# Patient Record
Sex: FEMALE | Race: BLACK OR AFRICAN AMERICAN | NOT HISPANIC OR LATINO | Employment: OTHER | URBAN - METROPOLITAN AREA
[De-identification: names, ages, dates, MRNs, and addresses within clinical notes are randomized per-mention and may not be internally consistent; named-entity substitution may affect disease eponyms.]

---

## 2017-01-06 DIAGNOSIS — I10 ESSENTIAL HYPERTENSION: ICD-10-CM

## 2017-01-06 RX ORDER — IRBESARTAN 150 MG/1
TABLET ORAL
Qty: 30 TABLET | Refills: 0 | Status: SHIPPED | OUTPATIENT
Start: 2017-01-06 | End: 2017-02-21 | Stop reason: SDUPTHER

## 2017-01-06 RX ORDER — ALLOPURINOL 100 MG/1
TABLET ORAL
Qty: 60 TABLET | Refills: 5 | Status: SHIPPED | OUTPATIENT
Start: 2017-01-06 | End: 2018-03-08 | Stop reason: SDUPTHER

## 2017-01-06 RX ORDER — TORSEMIDE 5 MG/1
TABLET ORAL
Qty: 30 TABLET | Refills: 0 | Status: SHIPPED | OUTPATIENT
Start: 2017-01-06 | End: 2017-03-01 | Stop reason: SDUPTHER

## 2017-01-18 ENCOUNTER — APPOINTMENT (OUTPATIENT)
Dept: GENERAL RADIOLOGY | Facility: HOSPITAL | Age: 67
End: 2017-01-18

## 2017-01-18 ENCOUNTER — HOSPITAL ENCOUNTER (EMERGENCY)
Facility: HOSPITAL | Age: 67
Discharge: HOME | End: 2017-01-18
Attending: EMERGENCY MEDICINE | Admitting: EMERGENCY MEDICINE

## 2017-01-18 VITALS
SYSTOLIC BLOOD PRESSURE: 147 MMHG | RESPIRATION RATE: 16 BRPM | WEIGHT: 185 LBS | TEMPERATURE: 97.6 F | BODY MASS INDEX: 30.82 KG/M2 | DIASTOLIC BLOOD PRESSURE: 95 MMHG | HEIGHT: 65 IN | HEART RATE: 52 BPM | OXYGEN SATURATION: 96 %

## 2017-01-18 DIAGNOSIS — R07.89 ATYPICAL CHEST PAIN: Primary | ICD-10-CM

## 2017-01-18 DIAGNOSIS — I10 ESSENTIAL HYPERTENSION: ICD-10-CM

## 2017-01-18 DIAGNOSIS — S29.019A THORACIC MYOFASCIAL STRAIN, INITIAL ENCOUNTER: ICD-10-CM

## 2017-01-18 LAB
ALBUMIN SERPL-MCNC: 4.9 G/DL (ref 3.2–4.8)
ALBUMIN/GLOB SERPL: 1.5 G/DL (ref 1.5–2.5)
ALP SERPL-CCNC: 95 U/L (ref 25–100)
ALT SERPL W P-5'-P-CCNC: 57 U/L (ref 7–40)
ANION GAP SERPL CALCULATED.3IONS-SCNC: 12 MMOL/L (ref 3–11)
AST SERPL-CCNC: 38 U/L (ref 0–33)
BASOPHILS # BLD AUTO: 0.02 10*3/MM3 (ref 0–0.2)
BASOPHILS NFR BLD AUTO: 0.3 % (ref 0–1)
BILIRUB SERPL-MCNC: 1.1 MG/DL (ref 0.3–1.2)
BNP SERPL-MCNC: 49 PG/ML (ref 0–100)
BUN BLD-MCNC: 11 MG/DL (ref 9–23)
BUN/CREAT SERPL: 13.8 (ref 7–25)
CALCIUM SPEC-SCNC: 10.1 MG/DL (ref 8.7–10.4)
CHLORIDE SERPL-SCNC: 103 MMOL/L (ref 99–109)
CO2 SERPL-SCNC: 26 MMOL/L (ref 20–31)
CREAT BLD-MCNC: 0.8 MG/DL (ref 0.6–1.3)
DEPRECATED RDW RBC AUTO: 40.6 FL (ref 37–54)
EOSINOPHIL # BLD AUTO: 0.21 10*3/MM3 (ref 0.1–0.3)
EOSINOPHIL NFR BLD AUTO: 3.5 % (ref 0–3)
ERYTHROCYTE [DISTWIDTH] IN BLOOD BY AUTOMATED COUNT: 12 % (ref 11.3–14.5)
GFR SERPL CREATININE-BSD FRML MDRD: 87 ML/MIN/1.73
GLOBULIN UR ELPH-MCNC: 3.2 GM/DL
GLUCOSE BLD-MCNC: 177 MG/DL (ref 70–100)
HCT VFR BLD AUTO: 43.4 % (ref 34.5–44)
HGB BLD-MCNC: 15 G/DL (ref 11.5–15.5)
HOLD SPECIMEN: NORMAL
HOLD SPECIMEN: NORMAL
IMM GRANULOCYTES # BLD: 0.02 10*3/MM3 (ref 0–0.03)
IMM GRANULOCYTES NFR BLD: 0.3 % (ref 0–0.6)
LIPASE SERPL-CCNC: 28 U/L (ref 6–51)
LYMPHOCYTES # BLD AUTO: 2.62 10*3/MM3 (ref 0.6–4.8)
LYMPHOCYTES NFR BLD AUTO: 43.1 % (ref 24–44)
MCH RBC QN AUTO: 31.4 PG (ref 27–31)
MCHC RBC AUTO-ENTMCNC: 34.6 G/DL (ref 32–36)
MCV RBC AUTO: 91 FL (ref 80–99)
MONOCYTES # BLD AUTO: 0.42 10*3/MM3 (ref 0–1)
MONOCYTES NFR BLD AUTO: 6.9 % (ref 0–12)
NEUTROPHILS # BLD AUTO: 2.79 10*3/MM3 (ref 1.5–8.3)
NEUTROPHILS NFR BLD AUTO: 45.9 % (ref 41–71)
PLATELET # BLD AUTO: 307 10*3/MM3 (ref 150–450)
PMV BLD AUTO: 11.6 FL (ref 6–12)
POTASSIUM BLD-SCNC: 3.6 MMOL/L (ref 3.5–5.5)
PROT SERPL-MCNC: 8.1 G/DL (ref 5.7–8.2)
RBC # BLD AUTO: 4.77 10*6/MM3 (ref 3.89–5.14)
SODIUM BLD-SCNC: 141 MMOL/L (ref 132–146)
TROPONIN I SERPL-MCNC: 0 NG/ML (ref 0–0.07)
TROPONIN I SERPL-MCNC: 0.01 NG/ML (ref 0–0.07)
WBC NRBC COR # BLD: 6.08 10*3/MM3 (ref 3.5–10.8)
WHOLE BLOOD HOLD SPECIMEN: NORMAL
WHOLE BLOOD HOLD SPECIMEN: NORMAL

## 2017-01-18 PROCEDURE — 93005 ELECTROCARDIOGRAM TRACING: CPT | Performed by: EMERGENCY MEDICINE

## 2017-01-18 PROCEDURE — 25010000002 HYDROMORPHONE PER 4 MG: Performed by: EMERGENCY MEDICINE

## 2017-01-18 PROCEDURE — 84484 ASSAY OF TROPONIN QUANT: CPT

## 2017-01-18 PROCEDURE — 25010000002 ONDANSETRON PER 1 MG: Performed by: EMERGENCY MEDICINE

## 2017-01-18 PROCEDURE — 83880 ASSAY OF NATRIURETIC PEPTIDE: CPT | Performed by: EMERGENCY MEDICINE

## 2017-01-18 PROCEDURE — 96375 TX/PRO/DX INJ NEW DRUG ADDON: CPT

## 2017-01-18 PROCEDURE — 25010000002 KETOROLAC TROMETHAMINE PER 15 MG: Performed by: EMERGENCY MEDICINE

## 2017-01-18 PROCEDURE — 80053 COMPREHEN METABOLIC PANEL: CPT | Performed by: EMERGENCY MEDICINE

## 2017-01-18 PROCEDURE — 96374 THER/PROPH/DIAG INJ IV PUSH: CPT

## 2017-01-18 PROCEDURE — 85025 COMPLETE CBC W/AUTO DIFF WBC: CPT | Performed by: EMERGENCY MEDICINE

## 2017-01-18 PROCEDURE — 93005 ELECTROCARDIOGRAM TRACING: CPT

## 2017-01-18 PROCEDURE — 83690 ASSAY OF LIPASE: CPT | Performed by: EMERGENCY MEDICINE

## 2017-01-18 PROCEDURE — 99284 EMERGENCY DEPT VISIT MOD MDM: CPT

## 2017-01-18 PROCEDURE — 71020 HC CHEST PA AND LATERAL: CPT

## 2017-01-18 RX ORDER — HYDROCODONE BITARTRATE AND ACETAMINOPHEN 5; 325 MG/1; MG/1
1 TABLET ORAL EVERY 6 HOURS PRN
Qty: 16 TABLET | Refills: 0 | Status: SHIPPED | OUTPATIENT
Start: 2017-01-18 | End: 2017-05-15

## 2017-01-18 RX ORDER — ASPIRIN 81 MG/1
324 TABLET, CHEWABLE ORAL ONCE
Status: COMPLETED | OUTPATIENT
Start: 2017-01-18 | End: 2017-01-18

## 2017-01-18 RX ORDER — ONDANSETRON 2 MG/ML
4 INJECTION INTRAMUSCULAR; INTRAVENOUS ONCE
Status: COMPLETED | OUTPATIENT
Start: 2017-01-18 | End: 2017-01-18

## 2017-01-18 RX ORDER — HYDROMORPHONE HYDROCHLORIDE 1 MG/ML
0.5 INJECTION, SOLUTION INTRAMUSCULAR; INTRAVENOUS; SUBCUTANEOUS ONCE
Status: COMPLETED | OUTPATIENT
Start: 2017-01-18 | End: 2017-01-18

## 2017-01-18 RX ORDER — SODIUM CHLORIDE 0.9 % (FLUSH) 0.9 %
10 SYRINGE (ML) INJECTION AS NEEDED
Status: DISCONTINUED | OUTPATIENT
Start: 2017-01-18 | End: 2017-01-18 | Stop reason: HOSPADM

## 2017-01-18 RX ORDER — KETOROLAC TROMETHAMINE 15 MG/ML
15 INJECTION, SOLUTION INTRAMUSCULAR; INTRAVENOUS ONCE
Status: COMPLETED | OUTPATIENT
Start: 2017-01-18 | End: 2017-01-18

## 2017-01-18 RX ADMIN — HYDROMORPHONE HYDROCHLORIDE 0.5 MG: 1 INJECTION, SOLUTION INTRAMUSCULAR; INTRAVENOUS; SUBCUTANEOUS at 12:00

## 2017-01-18 RX ADMIN — KETOROLAC TROMETHAMINE 15 MG: 15 INJECTION, SOLUTION INTRAMUSCULAR; INTRAVENOUS at 11:59

## 2017-01-18 RX ADMIN — ASPIRIN 81 MG 324 MG: 81 TABLET ORAL at 10:51

## 2017-01-18 RX ADMIN — ONDANSETRON 4 MG: 2 INJECTION INTRAMUSCULAR; INTRAVENOUS at 12:05

## 2017-01-18 NOTE — ED PROVIDER NOTES
Subjective   HPI Comments: Mrs. Moreno is a 66 y.o female who presents to the ED c/o chest pain starting earlier this morning. She locates a dull chest pain on her left side that originally originated from under her armpit. She also complains of a throbbing headache, throbbing right lower back pain, neck pain, earaches, swelling on her arms, and left shoulder pain and swelling for the last two weeks. She has tried ice and naproxen to relieve her shoulder pain with no relief. She also notes that her blood pressure was elevated to 191/131 tonight.  She reports that the chest pain started about 9:00 this morning and has been persistent since then.      She does report that she has a thoracic aneurysm but denies any CAD. She had a stress test one year ago by her cardiologist in [place] that was negative.       Patient is a 66 y.o. female presenting with chest pain.   History provided by:  Patient  Chest Pain   Pain location:  L chest  Pain radiates to:  Does not radiate  Pain severity:  Moderate  Onset quality:  Sudden  Duration: today.  Timing:  Constant  Progression:  Unchanged  Chronicity:  New  Relieved by:  None tried  Worsened by:  Nothing  Ineffective treatments: Naproxen and ice.  Associated symptoms: back pain and headache    Associated symptoms: no fever    Risk factors: no coronary artery disease        Review of Systems   Constitutional: Negative for chills and fever.   Eyes: Positive for pain.   Cardiovascular: Positive for chest pain. Negative for leg swelling.   Musculoskeletal: Positive for back pain, joint swelling and neck pain.   Neurological: Positive for headaches.   All other systems reviewed and are negative.      Past Medical History   Diagnosis Date   • Anemia    • Backache    • Chronic bronchitis    • Depression    • Generalized osteoarthritis of multiple sites    • GERD (gastroesophageal reflux disease)    • Gout    • Hx of migraine headaches    • Hypertension    • Low back ache    •  Migraine    • Neck pain    • Reflux esophagitis    • Sleep disturbances    • Thoracic aneurysm without mention of rupture    • Urinary tract infection    • UTI (urinary tract infection)        Allergies   Allergen Reactions   • Fenofibrate Other (See Comments) and Myalgia   • Hydroxyzine    • Norvasc [Amlodipine Besylate]    • Amlodipine Rash   • Penicillins Rash       Past Surgical History   Procedure Laterality Date   • Gallbladder surgery       anastomosis of gallbladder   • Hysterectomy     • Tonsillectomy         Family History   Problem Relation Age of Onset   • Arthritis Mother    • Stroke Mother    • Hypertension Mother    • Heart attack Father    • Arthritis Father    • Diabetes Father    • Hypertension Father    • Kidney disease Father    • Hyperlipidemia Sister    • Migraines Sister    • Diabetes Brother    • Hyperlipidemia Brother    • Hypertension Brother    • Heart attack Other    • Stroke Other    • Cancer Other    • Stroke Other    • Obesity Other        Social History     Social History   • Marital status:      Spouse name: N/A   • Number of children: N/A   • Years of education: N/A     Social History Main Topics   • Smoking status: Former Smoker   • Smokeless tobacco: Never Used      Comment: 21 years old   • Alcohol use No   • Drug use: No   • Sexual activity: Not Asked     Other Topics Concern   • None     Social History Narrative   • None         Objective   Physical Exam   Constitutional: She is oriented to person, place, and time. She appears well-developed and well-nourished. No distress.   HENT:   Head: Normocephalic and atraumatic.   Eyes: Conjunctivae are normal.   Neck: Normal range of motion. Neck supple.   Cardiovascular: Normal rate, regular rhythm, normal heart sounds and intact distal pulses.    Pulmonary/Chest: Effort normal and breath sounds normal. No respiratory distress. She exhibits tenderness (Tenderness over left chest).   Abdominal: Soft. There is no tenderness.    Musculoskeletal: Normal range of motion. She exhibits tenderness.   Tenderness over right mid back. Tenderness over left shoulder.   Neurological: She is alert and oriented to person, place, and time.   Skin: Skin is warm and dry.   Psychiatric: She has a normal mood and affect. Her behavior is normal.   Nursing note and vitals reviewed.      Procedures         ED Course  ED Course   Comment By Time   I reviewed Mrs. Moreon's old records.  She had normal MRI angiograms of her head and neck in September.  She had a CT angiogram of her chest showing an ectatic aorta that was not felt to be aneurysmal.  She reports having a stress test in February as well by her cardiologist in [place].  Her current EKG shows some inverted T waves however when I look at her old records this is a chronic finding and is normal for her.  At the time of my exam her blood pressure is 138/80. Kali Chappell MD 01/18 7871   Mrs. Moreno appears comfortable.  I spoke with her and her son about findings.  Her current blood pressures 1:30 systolic.  I advised him of findings thus far.  Her back pain seems musculoskeletal as it is reproducible with palpation.  We will get a second troponin to make sure that the chest pain she developed today is not cardiac.  I don't think we need to repeat angiograms. Kali Chappell MD 01/18 3999   I spoke with Mrs. Moreno and her son about findings.  Her back pain and shoulder pain and neck pain all seemed reproducible with movement and/or palpation.  She tells me she normally takes Aleve but has run out of that recently.  She has been referred to physical therapy but tells me she's only been able to make it to 1 appointment so far.  I encouraged her to follow through with that.  I will write her a prescription for hydrocodone, I discussed the risks of that and told her she can't drive while being on it. Kali Chappell MD 01/18 4562                     MDM  Number of Diagnoses or Management  Options  Atypical chest pain: new and requires workup  Essential hypertension: established and worsening  Thoracic myofascial strain, initial encounter: new and requires workup     Amount and/or Complexity of Data Reviewed  Clinical lab tests: ordered and reviewed  Tests in the radiology section of CPT®: ordered and reviewed  Review and summarize past medical records: yes  Discuss the patient with other providers: yes  Independent visualization of images, tracings, or specimens: yes    Patient Progress  Patient progress: improved      Final diagnoses:   Atypical chest pain   Essential hypertension   Thoracic myofascial strain, initial encounter       Documentation assistance provided by [facility].  Information recorded by the navarroe was done at my direction and has been verified and validated by me.     Giana Kaiser  01/18/17 3308       Kali Chappell MD  01/18/17 8742

## 2017-01-18 NOTE — DISCHARGE INSTRUCTIONS
No driving on the pain medication.  Follow-up with physical therapy that her doctor has ordered.  Resume taking Aleve and make sure you take it on a full stomach.

## 2017-02-21 RX ORDER — IRBESARTAN 150 MG/1
TABLET ORAL
Qty: 30 TABLET | Refills: 2 | Status: SHIPPED | OUTPATIENT
Start: 2017-02-21 | End: 2017-06-09 | Stop reason: SDUPTHER

## 2017-03-01 DIAGNOSIS — I10 ESSENTIAL HYPERTENSION: ICD-10-CM

## 2017-03-01 RX ORDER — GLIMEPIRIDE 1 MG/1
TABLET ORAL
Qty: 180 TABLET | Refills: 0 | Status: SHIPPED | OUTPATIENT
Start: 2017-03-01 | End: 2017-06-09 | Stop reason: SDUPTHER

## 2017-03-02 RX ORDER — TORSEMIDE 5 MG/1
5 TABLET ORAL DAILY
Qty: 30 TABLET | Refills: 2 | Status: SHIPPED | OUTPATIENT
Start: 2017-03-02 | End: 2017-06-11 | Stop reason: SDUPTHER

## 2017-03-21 ENCOUNTER — OFFICE VISIT (OUTPATIENT)
Dept: RETAIL CLINIC | Facility: CLINIC | Age: 67
End: 2017-03-21

## 2017-03-21 VITALS
WEIGHT: 180 LBS | TEMPERATURE: 98.7 F | HEART RATE: 73 BPM | RESPIRATION RATE: 20 BRPM | SYSTOLIC BLOOD PRESSURE: 138 MMHG | OXYGEN SATURATION: 99 % | BODY MASS INDEX: 29.99 KG/M2 | HEIGHT: 65 IN | DIASTOLIC BLOOD PRESSURE: 82 MMHG

## 2017-03-21 DIAGNOSIS — J01.90 ACUTE SINUSITIS, RECURRENCE NOT SPECIFIED, UNSPECIFIED LOCATION: Primary | ICD-10-CM

## 2017-03-21 DIAGNOSIS — R11.2 NAUSEA VOMITING AND DIARRHEA: ICD-10-CM

## 2017-03-21 DIAGNOSIS — R19.7 NAUSEA VOMITING AND DIARRHEA: ICD-10-CM

## 2017-03-21 LAB
EXPIRATION DATE: NORMAL
FLUAV AG NPH QL: NEGATIVE
FLUBV AG NPH QL: NEGATIVE
INTERNAL CONTROL: NORMAL
Lab: NORMAL

## 2017-03-21 PROCEDURE — 99213 OFFICE O/P EST LOW 20 MIN: CPT | Performed by: NURSE PRACTITIONER

## 2017-03-21 PROCEDURE — 87804 INFLUENZA ASSAY W/OPTIC: CPT | Performed by: NURSE PRACTITIONER

## 2017-03-21 RX ORDER — FLUTICASONE PROPIONATE 50 MCG
2 SPRAY, SUSPENSION (ML) NASAL DAILY
Qty: 1 EACH | Refills: 0 | Status: SHIPPED | OUTPATIENT
Start: 2017-03-21 | End: 2017-04-20

## 2017-03-21 RX ORDER — FLUCONAZOLE 150 MG/1
150 TABLET ORAL ONCE
Qty: 1 TABLET | Refills: 0 | Status: SHIPPED | OUTPATIENT
Start: 2017-03-21 | End: 2017-03-21

## 2017-03-21 RX ORDER — DOXYCYCLINE HYCLATE 100 MG/1
100 CAPSULE ORAL 2 TIMES DAILY
Qty: 14 CAPSULE | Refills: 0 | Status: SHIPPED | OUTPATIENT
Start: 2017-03-21 | End: 2017-03-28

## 2017-03-21 RX ORDER — LORATADINE 10 MG/1
10 TABLET ORAL DAILY
Qty: 10 TABLET | Refills: 0 | Status: SHIPPED | OUTPATIENT
Start: 2017-03-21 | End: 2017-03-31

## 2017-03-21 RX ORDER — PROMETHAZINE HYDROCHLORIDE 25 MG/1
12.5 TABLET ORAL EVERY 8 HOURS PRN
Qty: 5 TABLET | Refills: 0 | Status: SHIPPED | OUTPATIENT
Start: 2017-03-21 | End: 2017-03-24

## 2017-03-21 NOTE — PROGRESS NOTES
Subjective   Taina Moreno is a 67 y.o. female presents with possible sinus infection and flu like symptoms that started last Monday.   has continued to get worse.   took a zpak that she had at home and mucinex with very little relief.   has been having nausea, vomiting and diarrhea but seems to be resolving, no abdominal pain.  No vomiting today and diarrhea x 2 watery to loose stool this am.   has been drinking but not eating much since she has been sick.  has been having sinus symptoms and feels like sinus infections that she has had in the past but wasn't sure because of other symptoms.   she thought that she might have the flu due to other symptoms along with sinus symptoms.     Sinusitis   This is a new problem. The current episode started 1 to 4 weeks ago. The problem has been gradually worsening since onset. Maximum temperature: has not cked. Her pain is at a severity of 5/10. The pain is moderate. Associated symptoms include chills, congestion, coughing (mostly dry occasional yellowish green sputum), ear pain (ear pressure bilat), headaches (frontal intermittent), sinus pressure and a sore throat. Pertinent negatives include no diaphoresis, hoarse voice, neck pain, shortness of breath, sneezing or swollen glands. Past treatments include antibiotics (mucinex). The treatment provided mild relief.   Vomiting    This is a new problem. The current episode started in the past 7 days. The problem occurs less than 2 times per day. The problem has been resolved. The emesis has an appearance of bile and stomach contents. Maximum temperature: unsure has not cked. Associated symptoms include chills, coughing (mostly dry occasional yellowish green sputum), diarrhea (diarrhea x 2 today watery), a fever (did not ck, but felt hot), headaches (frontal intermittent) and myalgias (generlized body aches). Pertinent negatives include no abdominal pain, arthralgias, chest pain, sweats, URI  or weight loss. Treatments tried: take zpak and mucinex. The treatment provided no relief.   Diarrhea    This is a new problem. The current episode started in the past 7 days. The problem occurs less than 2 times per day. The problem has been gradually improving. The stool consistency is described as watery. The patient states that diarrhea does not awaken her from sleep. Associated symptoms include chills, coughing (mostly dry occasional yellowish green sputum), a fever (did not ck, but felt hot), headaches (frontal intermittent), myalgias (generlized body aches) and vomiting (no vomiting today). Pertinent negatives include no abdominal pain, arthralgias, bloating, sweats, URI or weight loss. Nothing aggravates the symptoms. Risk factors: none known. She has tried nothing for the symptoms.        The following portions of the patient's history were reviewed and updated as appropriate: allergies, current medications, past family history, past medical history, past social history and past surgical history.    Review of Systems   Constitutional: Positive for chills and fever (did not ck, but felt hot). Negative for diaphoresis and weight loss.   HENT: Positive for congestion, ear pain (ear pressure bilat), postnasal drip, sinus pressure and sore throat. Negative for hoarse voice and sneezing.    Eyes: Negative.    Respiratory: Positive for cough (mostly dry occasional yellowish green sputum). Negative for chest tightness, shortness of breath and wheezing.    Cardiovascular: Negative.  Negative for chest pain, palpitations and leg swelling.   Gastrointestinal: Positive for diarrhea (diarrhea x 2 today watery) and vomiting (no vomiting today). Negative for abdominal distention, abdominal pain, anal bleeding, bloating, blood in stool, constipation and rectal pain.   Genitourinary: Negative.    Musculoskeletal: Positive for myalgias (generlized body aches). Negative for arthralgias and neck pain.   Skin: Negative.     Neurological: Positive for headaches (frontal intermittent).       Objective   Physical Exam   Constitutional: She is oriented to person, place, and time. She appears well-developed and well-nourished. No distress.   HENT:   Head: Normocephalic.   Right Ear: A middle ear effusion is present.   Left Ear: A middle ear effusion is present.   Nose: Mucosal edema and sinus tenderness present. Right sinus exhibits maxillary sinus tenderness and frontal sinus tenderness. Left sinus exhibits maxillary sinus tenderness and frontal sinus tenderness.   Mouth/Throat: Uvula is midline and mucous membranes are normal. Posterior oropharyngeal erythema (mild erythema with PND) present. Tonsils are 0 on the right. Tonsils are 0 on the left. No tonsillar exudate.   Eyes: Conjunctivae, EOM and lids are normal. Pupils are equal, round, and reactive to light.   Neck: Normal range of motion.   Cardiovascular: Normal rate and regular rhythm.    No murmur heard.  Pulmonary/Chest: Effort normal and breath sounds normal. No respiratory distress. She has no wheezes. She has no rhonchi. She has no rales.   Abdominal: Soft. Bowel sounds are normal. She exhibits no distension. There is no tenderness.   Lymphadenopathy:     She has cervical adenopathy (bilat anterior cervical).   Neurological: She is alert and oriented to person, place, and time.   Skin: Skin is warm and dry.   Psychiatric: She has a normal mood and affect. Her speech is normal and behavior is normal. Thought content normal.       Assessment/Plan   Taina was seen today for sinusitis.    Diagnoses and all orders for this visit:    Acute sinusitis, recurrence not specified, unspecified location  Nausea vomiting and diarrhea  -     POC Influenza A / B, negative, discussed with patient  -     doxycycline (VIBRAMYCIN) 100 MG capsule; Take 1 capsule by mouth 2 (Two) Times a Day for 7 days.  -     loratadine (CLARITIN) 10 MG tablet; Take 1 tablet by mouth Daily for 10 days.  -      promethazine (PHENERGAN) 25 MG tablet; Take 0.5 tablets by mouth Every 8 (Eight) Hours As Needed for Nausea or Vomiting for up to 3 days.  -     fluticasone (FLONASE) 50 MCG/ACT nasal spray; 2 sprays into each nostril Daily for 30 days.    Instructed patient and daughter-in-law to go to PCP, urgent care or ER if continued vomiting or diarrhea due to being a diabetic and health history. (May need labs, IVFs, etc)  Patient and daughter-in-law agreed.  Patient refused to go at present time.  Medication and plan of care reviewed with patient and teaching done on med reactions/side effects.  Rest, plenty of fluids, BRAT diet and increase as tolerated, humidifier, comfort measures and follow up with your PCP if symptoms persist.  If worse in any way go to urgent care or the ER today as discussed.  Patient and daughter-in-law verbalized understanding.    ANTONI Villatoro

## 2017-04-12 RX ORDER — DILTIAZEM HYDROCHLORIDE 120 MG/1
TABLET, FILM COATED ORAL
Qty: 360 TABLET | Refills: 0 | Status: CANCELLED | OUTPATIENT
Start: 2017-04-12

## 2017-04-13 ENCOUNTER — TELEPHONE (OUTPATIENT)
Dept: FAMILY MEDICINE CLINIC | Facility: CLINIC | Age: 67
End: 2017-04-13

## 2017-04-13 NOTE — TELEPHONE ENCOUNTER
----- Message from Ai Bustos sent at 4/13/2017 11:42 AM EDT -----  Contact: [facility]  PATIENT CALLING FOR A REFILL OF HER BLOOD PRESSURE MEDICATION:    DILTAZEM 120 MG  [facility] IN [facility]

## 2017-04-14 RX ORDER — DILTIAZEM HYDROCHLORIDE 120 MG/1
TABLET, FILM COATED ORAL
Qty: 360 TABLET | Refills: 0 | Status: SHIPPED | OUTPATIENT
Start: 2017-04-14 | End: 2017-10-17 | Stop reason: SDUPTHER

## 2017-05-15 ENCOUNTER — OFFICE VISIT (OUTPATIENT)
Dept: FAMILY MEDICINE CLINIC | Facility: CLINIC | Age: 67
End: 2017-05-15

## 2017-05-15 VITALS
TEMPERATURE: 98.2 F | SYSTOLIC BLOOD PRESSURE: 140 MMHG | RESPIRATION RATE: 16 BRPM | HEIGHT: 65 IN | HEART RATE: 76 BPM | WEIGHT: 178.4 LBS | BODY MASS INDEX: 29.72 KG/M2 | DIASTOLIC BLOOD PRESSURE: 80 MMHG

## 2017-05-15 DIAGNOSIS — R80.9 TYPE 2 DIABETES MELLITUS WITH MICROALBUMINURIA, WITHOUT LONG-TERM CURRENT USE OF INSULIN (HCC): ICD-10-CM

## 2017-05-15 DIAGNOSIS — R74.8 ABNORMAL LIVER ENZYMES: ICD-10-CM

## 2017-05-15 DIAGNOSIS — Z12.31 ENCOUNTER FOR SCREENING MAMMOGRAM FOR BREAST CANCER: ICD-10-CM

## 2017-05-15 DIAGNOSIS — F51.01 PRIMARY INSOMNIA: ICD-10-CM

## 2017-05-15 DIAGNOSIS — E11.29 TYPE 2 DIABETES MELLITUS WITH MICROALBUMINURIA, WITHOUT LONG-TERM CURRENT USE OF INSULIN (HCC): ICD-10-CM

## 2017-05-15 DIAGNOSIS — I10 ESSENTIAL HYPERTENSION: ICD-10-CM

## 2017-05-15 DIAGNOSIS — E78.2 MIXED HYPERLIPIDEMIA: Primary | ICD-10-CM

## 2017-05-15 PROCEDURE — 99214 OFFICE O/P EST MOD 30 MIN: CPT | Performed by: FAMILY MEDICINE

## 2017-05-15 RX ORDER — POTASSIUM CHLORIDE 750 MG/1
10 CAPSULE, EXTENDED RELEASE ORAL DAILY
COMMUNITY
End: 2017-05-15 | Stop reason: SDUPTHER

## 2017-05-15 RX ORDER — POTASSIUM CHLORIDE 750 MG/1
10 CAPSULE, EXTENDED RELEASE ORAL DAILY
Qty: 90 CAPSULE | Refills: 3 | Status: SHIPPED | OUTPATIENT
Start: 2017-05-15 | End: 2020-02-17

## 2017-05-15 RX ORDER — TRAZODONE HYDROCHLORIDE 50 MG/1
TABLET ORAL
Qty: 60 TABLET | Refills: 3 | Status: SHIPPED | OUTPATIENT
Start: 2017-05-15 | End: 2021-02-01 | Stop reason: SDUPTHER

## 2017-05-16 LAB
ALBUMIN SERPL-MCNC: 4.9 G/DL (ref 3.2–4.8)
ALBUMIN/GLOB SERPL: 1.9 G/DL (ref 1.5–2.5)
ALP SERPL-CCNC: 110 U/L (ref 25–100)
ALT SERPL-CCNC: 60 U/L (ref 7–40)
AST SERPL-CCNC: 52 U/L (ref 0–33)
BASOPHILS # BLD AUTO: 0.03 10*3/MM3 (ref 0–0.2)
BASOPHILS NFR BLD AUTO: 0.5 % (ref 0–1)
BILIRUB SERPL-MCNC: 0.8 MG/DL (ref 0.3–1.2)
BUN SERPL-MCNC: 14 MG/DL (ref 9–23)
BUN/CREAT SERPL: 17.5 (ref 7–25)
CALCIUM SERPL-MCNC: 10.4 MG/DL (ref 8.7–10.4)
CHLORIDE SERPL-SCNC: 104 MMOL/L (ref 99–109)
CHOLEST SERPL-MCNC: 168 MG/DL (ref 0–200)
CO2 SERPL-SCNC: 26 MMOL/L (ref 20–31)
CREAT SERPL-MCNC: 0.8 MG/DL (ref 0.6–1.3)
EOSINOPHIL # BLD AUTO: 0.19 10*3/MM3 (ref 0.1–0.3)
EOSINOPHIL NFR BLD AUTO: 3 % (ref 0–3)
ERYTHROCYTE [DISTWIDTH] IN BLOOD BY AUTOMATED COUNT: 12.8 % (ref 11.3–14.5)
GLOBULIN SER CALC-MCNC: 2.6 GM/DL
GLUCOSE SERPL-MCNC: 184 MG/DL (ref 70–100)
HBA1C MFR BLD: 7.4 % (ref 4.8–5.6)
HCT VFR BLD AUTO: 43 % (ref 34.5–44)
HDLC SERPL-MCNC: 33 MG/DL (ref 40–60)
HGB BLD-MCNC: 14.4 G/DL (ref 11.5–15.5)
IMM GRANULOCYTES # BLD: 0.01 10*3/MM3 (ref 0–0.03)
IMM GRANULOCYTES NFR BLD: 0.2 % (ref 0–0.6)
LDLC SERPL CALC-MCNC: ABNORMAL MG/DL
LDLC/HDLC SERPL: ABNORMAL {RATIO}
LYMPHOCYTES # BLD AUTO: 3.02 10*3/MM3 (ref 0.6–4.8)
LYMPHOCYTES NFR BLD AUTO: 48.3 % (ref 24–44)
MCH RBC QN AUTO: 30.8 PG (ref 27–31)
MCHC RBC AUTO-ENTMCNC: 33.5 G/DL (ref 32–36)
MCV RBC AUTO: 91.9 FL (ref 80–99)
MICROALBUMIN UR-MCNC: 10.2 UG/ML
MONOCYTES # BLD AUTO: 0.44 10*3/MM3 (ref 0–1)
MONOCYTES NFR BLD AUTO: 7 % (ref 0–12)
NEUTROPHILS # BLD AUTO: 2.56 10*3/MM3 (ref 1.5–8.3)
NEUTROPHILS NFR BLD AUTO: 41 % (ref 41–71)
PLATELET # BLD AUTO: 279 10*3/MM3 (ref 150–450)
POTASSIUM SERPL-SCNC: 4.1 MMOL/L (ref 3.5–5.5)
PROT SERPL-MCNC: 7.5 G/DL (ref 5.7–8.2)
RBC # BLD AUTO: 4.68 10*6/MM3 (ref 3.89–5.14)
SODIUM SERPL-SCNC: 144 MMOL/L (ref 132–146)
TRIGL SERPL-MCNC: 497 MG/DL (ref 0–150)
VLDLC SERPL CALC-MCNC: ABNORMAL MG/DL
WBC # BLD AUTO: 6.25 10*3/MM3 (ref 3.5–10.8)

## 2017-05-17 ENCOUNTER — TELEPHONE (OUTPATIENT)
Dept: FAMILY MEDICINE CLINIC | Facility: CLINIC | Age: 67
End: 2017-05-17

## 2017-05-17 DIAGNOSIS — E11.29 TYPE 2 DIABETES MELLITUS WITH MICROALBUMINURIA, WITHOUT LONG-TERM CURRENT USE OF INSULIN (HCC): Primary | ICD-10-CM

## 2017-05-17 DIAGNOSIS — R80.9 TYPE 2 DIABETES MELLITUS WITH MICROALBUMINURIA, WITHOUT LONG-TERM CURRENT USE OF INSULIN (HCC): Primary | ICD-10-CM

## 2017-05-17 RX ORDER — ATORVASTATIN CALCIUM 20 MG/1
20 TABLET, FILM COATED ORAL DAILY
Qty: 90 TABLET | Refills: 1 | Status: SHIPPED | OUTPATIENT
Start: 2017-05-17 | End: 2017-09-08

## 2017-05-19 ENCOUNTER — TELEPHONE (OUTPATIENT)
Dept: FAMILY MEDICINE CLINIC | Facility: CLINIC | Age: 67
End: 2017-05-19

## 2017-05-23 ENCOUNTER — TELEPHONE (OUTPATIENT)
Dept: FAMILY MEDICINE CLINIC | Facility: CLINIC | Age: 67
End: 2017-05-23

## 2017-06-09 ENCOUNTER — TELEPHONE (OUTPATIENT)
Dept: FAMILY MEDICINE CLINIC | Facility: CLINIC | Age: 67
End: 2017-06-09

## 2017-06-09 DIAGNOSIS — I10 ESSENTIAL HYPERTENSION: ICD-10-CM

## 2017-06-09 RX ORDER — GLIMEPIRIDE 1 MG/1
TABLET ORAL
Qty: 180 TABLET | Refills: 0 | Status: SHIPPED | OUTPATIENT
Start: 2017-06-09 | End: 2017-10-17 | Stop reason: SDUPTHER

## 2017-06-09 RX ORDER — IRBESARTAN 150 MG/1
TABLET ORAL
Qty: 30 TABLET | Refills: 0 | Status: SHIPPED | OUTPATIENT
Start: 2017-06-09 | End: 2017-06-11 | Stop reason: SDUPTHER

## 2017-06-09 NOTE — TELEPHONE ENCOUNTER
----- Message from Katie Lloyd sent at 6/9/2017  4:21 PM EDT -----  Contact: DR. ALONZO; MED REFILL REQUEST   Pt is requesting a refill on the following medication   irbesartan (AVAPRO) 150 MG tablet      Estradiol (ESTRACE) 1 MG tablet    torsemide (DEMADEX) 5 MG tablet    Pharmacy   [facility]     Call Back #  [phone]

## 2017-06-11 RX ORDER — TORSEMIDE 5 MG/1
5 TABLET ORAL DAILY
Qty: 30 TABLET | Refills: 2 | Status: SHIPPED | OUTPATIENT
Start: 2017-06-11 | End: 2018-08-12 | Stop reason: SDUPTHER

## 2017-06-11 RX ORDER — ESTRADIOL 1 MG/1
1 TABLET ORAL DAILY
Qty: 30 TABLET | Refills: 2 | Status: SHIPPED | OUTPATIENT
Start: 2017-06-11 | End: 2017-12-07 | Stop reason: SDUPTHER

## 2017-06-11 RX ORDER — IRBESARTAN 150 MG/1
150 TABLET ORAL DAILY
Qty: 30 TABLET | Refills: 2 | Status: SHIPPED | OUTPATIENT
Start: 2017-06-11 | End: 2017-10-12 | Stop reason: SDUPTHER

## 2017-09-08 ENCOUNTER — OFFICE VISIT (OUTPATIENT)
Dept: FAMILY MEDICINE CLINIC | Facility: CLINIC | Age: 67
End: 2017-09-08

## 2017-09-08 VITALS
SYSTOLIC BLOOD PRESSURE: 150 MMHG | HEIGHT: 65 IN | RESPIRATION RATE: 16 BRPM | DIASTOLIC BLOOD PRESSURE: 60 MMHG | HEART RATE: 72 BPM | WEIGHT: 180.4 LBS | BODY MASS INDEX: 30.06 KG/M2 | TEMPERATURE: 98.1 F

## 2017-09-08 DIAGNOSIS — E53.8 COBALAMIN DEFICIENCY: ICD-10-CM

## 2017-09-08 DIAGNOSIS — F32.1 MODERATE SINGLE CURRENT EPISODE OF MAJOR DEPRESSIVE DISORDER (HCC): ICD-10-CM

## 2017-09-08 DIAGNOSIS — E11.29 TYPE 2 DIABETES MELLITUS WITH MICROALBUMINURIA, WITHOUT LONG-TERM CURRENT USE OF INSULIN (HCC): Primary | ICD-10-CM

## 2017-09-08 DIAGNOSIS — R01.1 HEART MURMUR: ICD-10-CM

## 2017-09-08 DIAGNOSIS — R80.9 TYPE 2 DIABETES MELLITUS WITH MICROALBUMINURIA, WITHOUT LONG-TERM CURRENT USE OF INSULIN (HCC): Primary | ICD-10-CM

## 2017-09-08 DIAGNOSIS — Z12.11 COLON CANCER SCREENING: ICD-10-CM

## 2017-09-08 DIAGNOSIS — E78.2 MIXED HYPERLIPIDEMIA: ICD-10-CM

## 2017-09-08 DIAGNOSIS — R74.8 ABNORMAL LIVER ENZYMES: ICD-10-CM

## 2017-09-08 DIAGNOSIS — I10 ESSENTIAL HYPERTENSION: ICD-10-CM

## 2017-09-08 DIAGNOSIS — R51.9 HEADACHE, UNSPECIFIED HEADACHE TYPE: ICD-10-CM

## 2017-09-08 LAB
ALBUMIN SERPL-MCNC: 4.6 G/DL (ref 3.2–4.8)
ALBUMIN/GLOB SERPL: 1.5 G/DL (ref 1.5–2.5)
ALP SERPL-CCNC: 118 U/L (ref 25–100)
ALT SERPL-CCNC: 62 U/L (ref 7–40)
AST SERPL-CCNC: 46 U/L (ref 0–33)
BILIRUB SERPL-MCNC: 0.8 MG/DL (ref 0.3–1.2)
BUN SERPL-MCNC: 9 MG/DL (ref 9–23)
BUN/CREAT SERPL: 11.3 (ref 7–25)
CALCIUM SERPL-MCNC: 9.7 MG/DL (ref 8.7–10.4)
CHLORIDE SERPL-SCNC: 103 MMOL/L (ref 99–109)
CHOLEST SERPL-MCNC: 141 MG/DL (ref 0–200)
CO2 SERPL-SCNC: 26 MMOL/L (ref 20–31)
CREAT SERPL-MCNC: 0.8 MG/DL (ref 0.6–1.3)
GLOBULIN SER CALC-MCNC: 3 GM/DL
GLUCOSE SERPL-MCNC: 163 MG/DL (ref 70–100)
HBA1C MFR BLD: 6.7 % (ref 4.8–5.6)
HDLC SERPL-MCNC: 36 MG/DL (ref 40–60)
LDLC SERPL CALC-MCNC: 55 MG/DL (ref 0–100)
LDLC/HDLC SERPL: 1.54 {RATIO}
POTASSIUM SERPL-SCNC: 4 MMOL/L (ref 3.5–5.5)
PROT SERPL-MCNC: 7.6 G/DL (ref 5.7–8.2)
SODIUM SERPL-SCNC: 139 MMOL/L (ref 132–146)
TRIGL SERPL-MCNC: 248 MG/DL (ref 0–150)
VIT B12 SERPL-MCNC: 495 PG/ML (ref 211–911)
VLDLC SERPL CALC-MCNC: 49.6 MG/DL

## 2017-09-08 PROCEDURE — 99214 OFFICE O/P EST MOD 30 MIN: CPT | Performed by: FAMILY MEDICINE

## 2017-09-08 RX ORDER — FLUOXETINE HYDROCHLORIDE 20 MG/1
20 CAPSULE ORAL DAILY
Qty: 30 CAPSULE | Refills: 5 | Status: SHIPPED | OUTPATIENT
Start: 2017-09-08 | End: 2018-02-08 | Stop reason: SDDI

## 2017-09-08 RX ORDER — TRAMADOL HYDROCHLORIDE 50 MG/1
TABLET ORAL
Qty: 20 TABLET | Refills: 0 | Status: SHIPPED | OUTPATIENT
Start: 2017-09-08 | End: 2018-08-26 | Stop reason: SDUPTHER

## 2017-09-08 NOTE — PROGRESS NOTES
Subjective    FU Diab Type 2 & HLP. Pt has not been taking Atorvastatin 20mg, was afraid to take it.   NOTE: pt is NOT fasting today.   Pt will call back later w/ refills if necessary.   See depression screening.    Taina Moreno is a 67 y.o. female.     History of Present Illness   Patient returns today for follow-up of chronic medical conditions as noted above.      Since Taina's last visit She has not been to an Emergency Dept or Urgent Care facility.    She has had no problems with current medications.    Did not take lipid Rx - concerned about it; did not call in her concerns. Did not feel well with the medication.    HA off and on for a while - pounding from the neck up; not resting well; sometimes BP is normal and still with HA.    Lots of stress - feels jittery all the time, not enjoying things as before - last 2 years.    The following portions of the patient's history were reviewed and updated as appropriate: allergies, current medications, past medical history, past social history and problem list.    Review of Systems   Constitutional: Positive for fatigue.   Respiratory: Negative.  Negative for shortness of breath.    Cardiovascular: Negative.  Negative for chest pain.   Gastrointestinal: Negative.    Musculoskeletal: Negative.    Skin: Negative.    Neurological: Positive for headaches. Negative for numbness.   Hematological: Negative for adenopathy.   Psychiatric/Behavioral: Positive for dysphoric mood and sleep disturbance. Negative for hallucinations. The patient is nervous/anxious.        Objective   Physical Exam   Constitutional: She is oriented to person, place, and time. She appears well-developed and well-nourished.   HENT:   Mouth/Throat: Oropharynx is clear and moist.   Eyes: No scleral icterus.   Neck: Carotid bruit is not present.   Cardiovascular: Normal rate and regular rhythm.    Murmur (trace systolic murmur) heard.  Pulmonary/Chest: Effort normal. She has no wheezes.   Abdominal:  Soft. Bowel sounds are normal. There is no tenderness.   Musculoskeletal: She exhibits no edema.    Taina had a diabetic foot exam performed today.  Lymphadenopathy:     She has no cervical adenopathy.   Neurological: She is alert and oriented to person, place, and time.   Skin: Skin is warm and dry. No rash noted.   Psychiatric: Thought content normal.   Tired appearing, flat affect   Nursing note and vitals reviewed.      Assessment/Plan   Taina was seen today for follow-up, diabetes and hyperlipidemia.    Diagnoses and all orders for this visit:    Type 2 diabetes mellitus with microalbuminuria, without long-term current use of insulin  -     Comprehensive Metabolic Panel  -     Hemoglobin A1c    Essential hypertension  -     Comprehensive Metabolic Panel    Mixed hyperlipidemia  -     Comprehensive Metabolic Panel  -     Lipid Panel With LDL / HDL Ratio    Abnormal liver enzymes    Cobalamin deficiency  -     Vitamin B12    Colon cancer screening  -     Ambulatory Referral For Screening Colonoscopy    Headache, unspecified headache type  -     traMADol (ULTRAM) 50 MG tablet; One po bid prn headache    Moderate single current episode of major depressive disorder  -     FLUoxetine (PROZAC) 20 MG capsule; Take 1 capsule by mouth Daily.    Heart murmur    Other orders  -     Cancel: Mammo Screening Digital Tomosynthesis Bilateral With CAD; Future  -     aspirin 81 MG tablet; Take 1 tablet by mouth Daily.    Recheck in 2 months - think the depression is key to her improving  Unclear etiology of HA - hydrate, better rest and clean diet (rare tramadol use as I would want her clear of NSAIDs)    Begin ASA 81 mg a day - emphasized the importance of this treatment for stroke and CAD prevention    Labs as noted    Overdue for mammogram - she needs to call - has rescheduled a number of times    Colon cancer screening due - will work on arranging colonoscopy over the next few months.    I continued to urge her to have  regular visits with us.  Difficult to deal with the number of medical problems that she's not been doing well with only every 6-12 months.  She is agreeable.  I do think that treating her depression will make a big difference in her overall well-being and hopefully the way that she's able to attack her some of her medical issues.    Recheck slight murmur at follow-up. May need echo.

## 2017-09-11 ENCOUNTER — TELEPHONE (OUTPATIENT)
Dept: FAMILY MEDICINE CLINIC | Facility: CLINIC | Age: 67
End: 2017-09-11

## 2017-09-11 RX ORDER — PROMETHAZINE HYDROCHLORIDE 25 MG/1
TABLET ORAL
Qty: 20 TABLET | Refills: 1 | Status: SHIPPED | OUTPATIENT
Start: 2017-09-11 | End: 2017-11-14 | Stop reason: SDUPTHER

## 2017-09-13 ENCOUNTER — DOCUMENTATION (OUTPATIENT)
Dept: FAMILY MEDICINE CLINIC | Facility: CLINIC | Age: 67
End: 2017-09-13

## 2017-09-13 NOTE — PROGRESS NOTES
Patient called GI and refused colonoscopy until maybe next year. I made clear the risks of waiting including undiagnosed polyps, or cancer.

## 2017-10-12 RX ORDER — IRBESARTAN 150 MG/1
TABLET ORAL
Qty: 30 TABLET | Refills: 2 | Status: SHIPPED | OUTPATIENT
Start: 2017-10-12 | End: 2018-01-06 | Stop reason: SDUPTHER

## 2017-10-17 RX ORDER — DILTIAZEM HYDROCHLORIDE 120 MG/1
TABLET, FILM COATED ORAL
Qty: 360 TABLET | Refills: 0 | Status: SHIPPED | OUTPATIENT
Start: 2017-10-17 | End: 2018-04-12 | Stop reason: SDUPTHER

## 2017-10-17 RX ORDER — GLIMEPIRIDE 1 MG/1
TABLET ORAL
Qty: 180 TABLET | Refills: 0 | Status: SHIPPED | OUTPATIENT
Start: 2017-10-17 | End: 2017-12-07 | Stop reason: SDUPTHER

## 2017-11-14 ENCOUNTER — OFFICE VISIT (OUTPATIENT)
Dept: FAMILY MEDICINE CLINIC | Facility: CLINIC | Age: 67
End: 2017-11-14

## 2017-11-14 VITALS
HEIGHT: 65 IN | WEIGHT: 181 LBS | DIASTOLIC BLOOD PRESSURE: 72 MMHG | BODY MASS INDEX: 30.16 KG/M2 | SYSTOLIC BLOOD PRESSURE: 130 MMHG | RESPIRATION RATE: 18 BRPM | HEART RATE: 76 BPM | TEMPERATURE: 97.8 F

## 2017-11-14 DIAGNOSIS — N30.01 ACUTE CYSTITIS WITH HEMATURIA: Primary | ICD-10-CM

## 2017-11-14 DIAGNOSIS — R31.0 GROSS HEMATURIA: ICD-10-CM

## 2017-11-14 LAB
BILIRUB BLD-MCNC: NEGATIVE MG/DL
CLARITY, POC: CLEAR
COLOR UR: ABNORMAL
GLUCOSE UR STRIP-MCNC: NEGATIVE MG/DL
KETONES UR QL: NEGATIVE
LEUKOCYTE EST, POC: ABNORMAL
NITRITE UR-MCNC: NEGATIVE MG/ML
PH UR: 5.5 [PH] (ref 5–8)
PROT UR STRIP-MCNC: ABNORMAL MG/DL
RBC # UR STRIP: ABNORMAL /UL
SP GR UR: 1.03 (ref 1–1.03)
UROBILINOGEN UR QL: ABNORMAL

## 2017-11-14 PROCEDURE — 99213 OFFICE O/P EST LOW 20 MIN: CPT | Performed by: FAMILY MEDICINE

## 2017-11-14 PROCEDURE — 81003 URINALYSIS AUTO W/O SCOPE: CPT | Performed by: FAMILY MEDICINE

## 2017-11-14 RX ORDER — PROMETHAZINE HYDROCHLORIDE 25 MG/1
TABLET ORAL
Qty: 20 TABLET | Refills: 1 | OUTPATIENT
Start: 2017-11-14 | End: 2018-02-23

## 2017-11-14 RX ORDER — CIPROFLOXACIN 500 MG/1
500 TABLET, FILM COATED ORAL 2 TIMES DAILY
Qty: 14 TABLET | Refills: 0 | Status: SHIPPED | OUTPATIENT
Start: 2017-11-14 | End: 2017-12-07

## 2017-11-14 RX ORDER — FLUCONAZOLE 200 MG/1
TABLET ORAL
Qty: 2 TABLET | Refills: 0 | Status: SHIPPED | OUTPATIENT
Start: 2017-11-14 | End: 2018-02-08

## 2017-11-14 NOTE — PROGRESS NOTES
Subjective   Taina Moreno is a 67 y.o. female.     History of Present Illness     Her urine has been darker more recently and then had an odor  She then thought it looked like there was blood  Pain in her back and nausea as well (phenergan helped)  Also feels weak  No fevers that she is aware of but had sweats and felt hot      Review of Systems   Constitutional: Negative for fever.   Genitourinary: Positive for frequency and urgency.       Objective   Physical Exam   Constitutional: She appears well-developed and well-nourished. No distress.   Cardiovascular: Normal rate, regular rhythm and normal heart sounds.    Pulmonary/Chest: Effort normal and breath sounds normal.   Abdominal: Soft. Normal appearance and bowel sounds are normal. There is tenderness (very mild) in the suprapubic area. There is no CVA tenderness.   Psychiatric: She has a normal mood and affect. Her behavior is normal.   Nursing note and vitals reviewed.      Assessment/Plan   Taina was seen today for urinary tract infection.    Diagnoses and all orders for this visit:    Acute cystitis with hematuria  -     POCT urinalysis dipstick, automated  -     Urine Culture - Urine, Urine, Clean Catch  -     promethazine (PHENERGAN) 25 MG tablet; 1/2 to one po q 4-6 hours prn nausea  -     ciprofloxacin (CIPRO) 500 MG tablet; Take 1 tablet by mouth 2 (Two) Times a Day.    Gross hematuria    Other orders  -     fluconazole (DIFLUCAN) 200 MG tablet; 1 po now, ok to repeat in 3 days if needed    likely UTI, will treat with cipro BID, phenergan and check urine culture.  F/u pending cx.  Did ask pt to return for UA in 2 weeks to make sure hematuria resolves.  Pt agrees.  Ok diflucan for yeast infection

## 2017-11-16 LAB
BACTERIA UR CULT: ABNORMAL
BACTERIA UR CULT: ABNORMAL

## 2017-11-27 ENCOUNTER — TELEPHONE (OUTPATIENT)
Dept: FAMILY MEDICINE CLINIC | Facility: CLINIC | Age: 67
End: 2017-11-27

## 2017-12-07 RX ORDER — ESTRADIOL 1 MG/1
TABLET ORAL
Qty: 30 TABLET | Refills: 2 | Status: SHIPPED | OUTPATIENT
Start: 2017-12-07 | End: 2018-05-15 | Stop reason: SDUPTHER

## 2017-12-07 RX ORDER — GLIMEPIRIDE 1 MG/1
TABLET ORAL
Qty: 180 TABLET | Refills: 0 | Status: SHIPPED | OUTPATIENT
Start: 2017-12-07 | End: 2018-06-22 | Stop reason: SDUPTHER

## 2018-01-08 RX ORDER — IRBESARTAN 150 MG/1
TABLET ORAL
Qty: 30 TABLET | Refills: 0 | Status: SHIPPED | OUTPATIENT
Start: 2018-01-08 | End: 2018-02-07 | Stop reason: SDUPTHER

## 2018-02-08 ENCOUNTER — OFFICE VISIT (OUTPATIENT)
Dept: FAMILY MEDICINE CLINIC | Facility: CLINIC | Age: 68
End: 2018-02-08

## 2018-02-08 VITALS
RESPIRATION RATE: 16 BRPM | WEIGHT: 180.8 LBS | TEMPERATURE: 98.1 F | HEART RATE: 80 BPM | HEIGHT: 65 IN | BODY MASS INDEX: 30.12 KG/M2

## 2018-02-08 DIAGNOSIS — K21.9 GASTROESOPHAGEAL REFLUX DISEASE WITHOUT ESOPHAGITIS: ICD-10-CM

## 2018-02-08 DIAGNOSIS — R80.9 TYPE 2 DIABETES MELLITUS WITH MICROALBUMINURIA, WITHOUT LONG-TERM CURRENT USE OF INSULIN (HCC): Primary | ICD-10-CM

## 2018-02-08 DIAGNOSIS — E53.8 COBALAMIN DEFICIENCY: ICD-10-CM

## 2018-02-08 DIAGNOSIS — R11.0 NAUSEA: ICD-10-CM

## 2018-02-08 DIAGNOSIS — R74.8 ABNORMAL LIVER ENZYMES: ICD-10-CM

## 2018-02-08 DIAGNOSIS — E11.29 TYPE 2 DIABETES MELLITUS WITH MICROALBUMINURIA, WITHOUT LONG-TERM CURRENT USE OF INSULIN (HCC): Primary | ICD-10-CM

## 2018-02-08 DIAGNOSIS — Z12.11 COLON CANCER SCREENING: ICD-10-CM

## 2018-02-08 DIAGNOSIS — Z12.31 ENCOUNTER FOR SCREENING MAMMOGRAM FOR BREAST CANCER: ICD-10-CM

## 2018-02-08 DIAGNOSIS — M79.7 FIBROMYALGIA: ICD-10-CM

## 2018-02-08 DIAGNOSIS — E78.2 MIXED HYPERLIPIDEMIA: ICD-10-CM

## 2018-02-08 PROCEDURE — 99214 OFFICE O/P EST MOD 30 MIN: CPT | Performed by: FAMILY MEDICINE

## 2018-02-08 RX ORDER — IRBESARTAN 150 MG/1
TABLET ORAL
Qty: 30 TABLET | Refills: 5 | Status: SHIPPED | OUTPATIENT
Start: 2018-02-08 | End: 2018-08-12 | Stop reason: SDUPTHER

## 2018-02-08 RX ORDER — ONDANSETRON 4 MG/1
4 TABLET, FILM COATED ORAL EVERY 8 HOURS PRN
Qty: 20 TABLET | Refills: 1 | Status: SHIPPED | OUTPATIENT
Start: 2018-02-08 | End: 2018-08-20

## 2018-02-08 RX ORDER — RANITIDINE 150 MG/1
150 CAPSULE ORAL 2 TIMES DAILY
Qty: 60 CAPSULE | Refills: 11 | Status: SHIPPED | OUTPATIENT
Start: 2018-02-08 | End: 2019-07-14 | Stop reason: SDUPTHER

## 2018-02-08 NOTE — PROGRESS NOTES
"Subjective    FU Diab Type 2, HLP, HTN, elevated LFT's, B12 def, heart murmur, depression (started on Prozac last visit) pt stopped it after a few pills & headache.  NOTE: pt is NOT fasting today.  Pt say's, she has been dx with GERD but never treated for it, she was at ER last Friday for chest pain, cardiac was okay, thinks possibly acid reflux related.   FYI: pt seeing Dr. Sanchez on Tuesday.   I completed patients depression screening, we had tried treating her for this at last visit but she was not pleased with the medication.    Taina Moreno is a 67 y.o. female.     History of Present Illness     Patient returns today for follow-up of chronic medical conditions as noted above.      Was in ED last Friday for CP - MI ruled out and GERD suspected; no tx given. Felt like \"lightening\" across both sides of her chest. Also, had some bilateral neck pain and diaphoretic; omeprazole had been given by GI physician but she never obtained due to cost. BMs unchanged    Still with depressive sx - did not call us with SEs and possibly seeking a med change.  A lot of stressors in her family; did not have SEs    Due for lab f/u of diabetes, lipids and liver enzymes    Overdue for breast and colon cancer screening    The following portions of the patient's history were reviewed and updated as appropriate: allergies, current medications, past medical history and problem list.    Review of Systems   Constitutional: Positive for fatigue.   Respiratory: Negative.  Negative for shortness of breath.    Cardiovascular: Negative.  Negative for chest pain.   Gastrointestinal: Negative.         Had GI cocktail in ED that helped - GERD sx   Musculoskeletal: Negative.    Skin: Negative.    Neurological: Negative for dizziness, numbness and headaches.   Hematological: Negative for adenopathy.   Psychiatric/Behavioral: Positive for dysphoric mood and sleep disturbance. Negative for hallucinations. The patient is nervous/anxious.  "       Objective   Physical Exam   Constitutional: She is oriented to person, place, and time. She appears well-developed and well-nourished.   Eyes: No scleral icterus.   Neck: Carotid bruit is not present.   Cardiovascular: Normal rate and regular rhythm.    Murmur (trace systolic murmur) heard.  Pulmonary/Chest: Effort normal. She has no wheezes.   A little discomfort over her xiphoid process   Abdominal: Soft. Bowel sounds are normal. There is tenderness (vague / mild midepigastric TTP). There is no rebound.   Musculoskeletal: She exhibits no edema.    Taina had a diabetic foot exam performed today.  Lymphadenopathy:     She has no cervical adenopathy.   Neurological: She is alert and oriented to person, place, and time.   Skin: Skin is warm and dry. No rash noted.   Psychiatric: Thought content normal.   Tired appearing, flat affect; smiling occassionally   Nursing note and vitals reviewed.      Assessment/Plan   Taina was seen today for follow-up, diabetes, hyperlipidemia, hypertension, elevated lft's, b12 def, depression, headache, heart murmur and heartburn.    Diagnoses and all orders for this visit:    Type 2 diabetes mellitus with microalbuminuria, without long-term current use of insulin  -     Hemoglobin A1c    Abnormal liver enzymes  -     Comprehensive Metabolic Panel    Mixed hyperlipidemia  -     Lipid Panel With LDL / HDL Ratio    Cobalamin deficiency  -     Vitamin B12  -     CBC & Differential    Fibromyalgia  Comments:  Mild exercise and stretching encouraged    Encounter for screening mammogram for breast cancer  -     Mammo Screening Digital Tomosynthesis Bilateral With CAD; Future    Gastroesophageal reflux disease without esophagitis  -     ranitidine (ZANTAC) 150 MG capsule; Take 1 capsule by mouth 2 (Two) Times a Day.  -     ondansetron (ZOFRAN) 4 MG tablet; Take 1 tablet by mouth Every 8 (Eight) Hours As Needed for Nausea or Vomiting.    Nausea  -     ondansetron (ZOFRAN) 4 MG tablet;  Take 1 tablet by mouth Every 8 (Eight) Hours As Needed for Nausea or Vomiting.    Colon cancer screening  -     Ambulatory Referral For Screening Colonoscopy    Regular use of Zantac bid recommended since she cannot afford a PPI  Keep f/u with cardiology next week - agree that CP likely GI in origin  Breast and colon cancer screening needed and discussed  Labs as noted today  Likely recheck in 3 months  To restart Prozac and let me know if not better in 2 weeks or so

## 2018-02-09 LAB
ALBUMIN SERPL-MCNC: 4.8 G/DL (ref 3.2–4.8)
ALBUMIN/GLOB SERPL: 1.8 G/DL (ref 1.5–2.5)
ALP SERPL-CCNC: 128 U/L (ref 25–100)
ALT SERPL-CCNC: 73 U/L (ref 7–40)
AST SERPL-CCNC: 49 U/L (ref 0–33)
BASOPHILS # BLD AUTO: 0.05 10*3/MM3 (ref 0–0.2)
BASOPHILS NFR BLD AUTO: 0.7 % (ref 0–1)
BILIRUB SERPL-MCNC: 0.6 MG/DL (ref 0.3–1.2)
BUN SERPL-MCNC: 10 MG/DL (ref 9–23)
BUN/CREAT SERPL: 12.5 (ref 7–25)
CALCIUM SERPL-MCNC: 9.8 MG/DL (ref 8.7–10.4)
CHLORIDE SERPL-SCNC: 99 MMOL/L (ref 99–109)
CHOLEST SERPL-MCNC: 138 MG/DL (ref 0–200)
CO2 SERPL-SCNC: 26 MMOL/L (ref 20–31)
CREAT SERPL-MCNC: 0.8 MG/DL (ref 0.6–1.3)
EOSINOPHIL # BLD AUTO: 0.24 10*3/MM3 (ref 0–0.3)
EOSINOPHIL NFR BLD AUTO: 3.2 % (ref 0–3)
ERYTHROCYTE [DISTWIDTH] IN BLOOD BY AUTOMATED COUNT: 12.2 % (ref 11.3–14.5)
GFR SERPLBLD CREATININE-BSD FMLA CKD-EPI: 72 ML/MIN/1.73
GFR SERPLBLD CREATININE-BSD FMLA CKD-EPI: 87 ML/MIN/1.73
GLOBULIN SER CALC-MCNC: 2.7 GM/DL
GLUCOSE SERPL-MCNC: 213 MG/DL (ref 70–100)
HBA1C MFR BLD: 7.4 % (ref 4.8–5.6)
HCT VFR BLD AUTO: 43 % (ref 34.5–44)
HDLC SERPL-MCNC: 38 MG/DL (ref 40–60)
HGB BLD-MCNC: 14.8 G/DL (ref 11.5–15.5)
IMM GRANULOCYTES # BLD: 0.02 10*3/MM3 (ref 0–0.03)
IMM GRANULOCYTES NFR BLD: 0.3 % (ref 0–0.6)
LDLC SERPL CALC-MCNC: 23 MG/DL (ref 0–100)
LDLC/HDLC SERPL: 0.62 {RATIO}
LYMPHOCYTES # BLD AUTO: 3.32 10*3/MM3 (ref 0.6–4.8)
LYMPHOCYTES NFR BLD AUTO: 44.9 % (ref 24–44)
MCH RBC QN AUTO: 31.8 PG (ref 27–31)
MCHC RBC AUTO-ENTMCNC: 34.4 G/DL (ref 32–36)
MCV RBC AUTO: 92.3 FL (ref 80–99)
MONOCYTES # BLD AUTO: 0.61 10*3/MM3 (ref 0–1)
MONOCYTES NFR BLD AUTO: 8.2 % (ref 0–12)
NEUTROPHILS # BLD AUTO: 3.16 10*3/MM3 (ref 1.5–8.3)
NEUTROPHILS NFR BLD AUTO: 42.7 % (ref 41–71)
PLATELET # BLD AUTO: 310 10*3/MM3 (ref 150–450)
POTASSIUM SERPL-SCNC: 4.2 MMOL/L (ref 3.5–5.5)
PROT SERPL-MCNC: 7.5 G/DL (ref 5.7–8.2)
RBC # BLD AUTO: 4.66 10*6/MM3 (ref 3.89–5.14)
SODIUM SERPL-SCNC: 137 MMOL/L (ref 132–146)
TRIGL SERPL-MCNC: 383 MG/DL (ref 0–150)
VIT B12 SERPL-MCNC: 826 PG/ML (ref 211–911)
VLDLC SERPL CALC-MCNC: 76.6 MG/DL
WBC # BLD AUTO: 7.4 10*3/MM3 (ref 3.5–10.8)

## 2018-02-16 ENCOUNTER — TELEPHONE (OUTPATIENT)
Dept: FAMILY MEDICINE CLINIC | Facility: CLINIC | Age: 68
End: 2018-02-16

## 2018-02-19 ENCOUNTER — TELEPHONE (OUTPATIENT)
Dept: FAMILY MEDICINE CLINIC | Facility: CLINIC | Age: 68
End: 2018-02-19

## 2018-02-19 NOTE — TELEPHONE ENCOUNTER
----- Message from Colby Bradley sent at 2/19/2018  4:14 PM EST -----  Contact: [facility] / PT CALL  PT CALLED AND STATED THAT SHE IS HAVING PAIN ON BREAST PLATE AND APPEARS TO BE SWOLLEN.  PATIENT STATED THAT SHE DOES HAVE A THORASIC ANYERISM.  SHE  STATED SHE HAS BEEN IN THE ER 3 TIMES SINCE 1/29/18 FOR KIDNEY STONES AND CHEST PAIN.  SHE WASN'T SURE IF SHE SHOULD COME IN OR GO TO THE ER.         CELL [phone].

## 2018-02-19 NOTE — TELEPHONE ENCOUNTER
Left detailed msg for pt, if pain is stable and she is not concerned she can call and sched an appt in the morning. We are leaving this up to her because we do not know her level of pain or her worry. If it is severe or anything changes she needed to go to the ER now for an eval.

## 2018-02-22 ENCOUNTER — HOSPITAL ENCOUNTER (OUTPATIENT)
Dept: CT IMAGING | Facility: HOSPITAL | Age: 68
Discharge: HOME | End: 2018-02-22
Attending: FAMILY MEDICINE | Admitting: FAMILY MEDICINE

## 2018-02-22 ENCOUNTER — HOSPITAL ENCOUNTER (EMERGENCY)
Facility: HOSPITAL | Age: 68
Discharge: HOME | End: 2018-02-22
Attending: EMERGENCY MEDICINE | Admitting: EMERGENCY MEDICINE

## 2018-02-22 ENCOUNTER — OFFICE VISIT (OUTPATIENT)
Dept: FAMILY MEDICINE CLINIC | Facility: CLINIC | Age: 68
End: 2018-02-22

## 2018-02-22 VITALS
RESPIRATION RATE: 16 BRPM | OXYGEN SATURATION: 94 % | HEIGHT: 65 IN | TEMPERATURE: 98.8 F | SYSTOLIC BLOOD PRESSURE: 136 MMHG | DIASTOLIC BLOOD PRESSURE: 71 MMHG | HEART RATE: 70 BPM | WEIGHT: 179.8 LBS | BODY MASS INDEX: 29.96 KG/M2

## 2018-02-22 VITALS
HEART RATE: 84 BPM | SYSTOLIC BLOOD PRESSURE: 100 MMHG | RESPIRATION RATE: 16 BRPM | HEIGHT: 65 IN | BODY MASS INDEX: 29.96 KG/M2 | WEIGHT: 179.8 LBS | DIASTOLIC BLOOD PRESSURE: 60 MMHG | TEMPERATURE: 97.4 F

## 2018-02-22 DIAGNOSIS — M54.50 ACUTE LEFT-SIDED LOW BACK PAIN WITHOUT SCIATICA: ICD-10-CM

## 2018-02-22 DIAGNOSIS — R31.0 GROSS HEMATURIA: ICD-10-CM

## 2018-02-22 DIAGNOSIS — E11.29 TYPE 2 DIABETES MELLITUS WITH MICROALBUMINURIA, WITHOUT LONG-TERM CURRENT USE OF INSULIN (HCC): ICD-10-CM

## 2018-02-22 DIAGNOSIS — N23 RENAL COLIC ON LEFT SIDE: Primary | ICD-10-CM

## 2018-02-22 DIAGNOSIS — N20.0 NEPHROLITHIASIS: Primary | ICD-10-CM

## 2018-02-22 DIAGNOSIS — R80.9 TYPE 2 DIABETES MELLITUS WITH MICROALBUMINURIA, WITHOUT LONG-TERM CURRENT USE OF INSULIN (HCC): ICD-10-CM

## 2018-02-22 DIAGNOSIS — N20.1 URETEROLITHIASIS: ICD-10-CM

## 2018-02-22 DIAGNOSIS — N20.0 NEPHROLITHIASIS: ICD-10-CM

## 2018-02-22 LAB
ANION GAP SERPL CALCULATED.3IONS-SCNC: 11 MMOL/L (ref 3–11)
BACTERIA UR QL AUTO: ABNORMAL /HPF
BASOPHILS # BLD AUTO: 0.04 10*3/MM3 (ref 0–0.2)
BASOPHILS NFR BLD AUTO: 0.5 % (ref 0–1)
BILIRUB BLD-MCNC: ABNORMAL MG/DL
BILIRUB UR QL STRIP: NEGATIVE
BUN BLD-MCNC: 12 MG/DL (ref 9–23)
BUN/CREAT SERPL: 10.9 (ref 7–25)
CALCIUM SPEC-SCNC: 9.3 MG/DL (ref 8.7–10.4)
CHLORIDE SERPL-SCNC: 102 MMOL/L (ref 99–109)
CLARITY UR: CLEAR
CLARITY, POC: ABNORMAL
CO2 SERPL-SCNC: 26 MMOL/L (ref 20–31)
COLOR UR: ABNORMAL
COLOR UR: YELLOW
CREAT BLD-MCNC: 1.1 MG/DL (ref 0.6–1.3)
CREAT BLDA-MCNC: 0.9 MG/DL (ref 0.6–1.3)
DEPRECATED RDW RBC AUTO: 40.6 FL (ref 37–54)
EOSINOPHIL # BLD AUTO: 0.18 10*3/MM3 (ref 0–0.3)
EOSINOPHIL NFR BLD AUTO: 2.3 % (ref 0–3)
ERYTHROCYTE [DISTWIDTH] IN BLOOD BY AUTOMATED COUNT: 12 % (ref 11.3–14.5)
GFR SERPL CREATININE-BSD FRML MDRD: 60 ML/MIN/1.73
GLUCOSE BLD-MCNC: 194 MG/DL (ref 70–100)
GLUCOSE UR STRIP-MCNC: NEGATIVE MG/DL
GLUCOSE UR STRIP-MCNC: NEGATIVE MG/DL
HCT VFR BLD AUTO: 38.5 % (ref 34.5–44)
HGB BLD-MCNC: 13.1 G/DL (ref 11.5–15.5)
HGB UR QL STRIP.AUTO: ABNORMAL
HYALINE CASTS UR QL AUTO: ABNORMAL /LPF
IMM GRANULOCYTES # BLD: 0.01 10*3/MM3 (ref 0–0.03)
IMM GRANULOCYTES NFR BLD: 0.1 % (ref 0–0.6)
KETONES UR QL STRIP: ABNORMAL
KETONES UR QL: NEGATIVE
LEUKOCYTE EST, POC: ABNORMAL
LEUKOCYTE ESTERASE UR QL STRIP.AUTO: NEGATIVE
LYMPHOCYTES # BLD AUTO: 2.72 10*3/MM3 (ref 0.6–4.8)
LYMPHOCYTES NFR BLD AUTO: 34.6 % (ref 24–44)
MCH RBC QN AUTO: 31.1 PG (ref 27–31)
MCHC RBC AUTO-ENTMCNC: 34 G/DL (ref 32–36)
MCV RBC AUTO: 91.4 FL (ref 80–99)
MONOCYTES # BLD AUTO: 0.66 10*3/MM3 (ref 0–1)
MONOCYTES NFR BLD AUTO: 8.4 % (ref 0–12)
NEUTROPHILS # BLD AUTO: 4.26 10*3/MM3 (ref 1.5–8.3)
NEUTROPHILS NFR BLD AUTO: 54.1 % (ref 41–71)
NITRITE UR QL STRIP: NEGATIVE
NITRITE UR-MCNC: NEGATIVE MG/ML
PH UR STRIP.AUTO: <=5 [PH] (ref 5–8)
PH UR: 5.5 [PH] (ref 5–8)
PLATELET # BLD AUTO: 277 10*3/MM3 (ref 150–450)
PMV BLD AUTO: 10.6 FL (ref 6–12)
POTASSIUM BLD-SCNC: 3.6 MMOL/L (ref 3.5–5.5)
PROT UR QL STRIP: NEGATIVE
PROT UR STRIP-MCNC: ABNORMAL MG/DL
RBC # BLD AUTO: 4.21 10*6/MM3 (ref 3.89–5.14)
RBC # UR STRIP: ABNORMAL /UL
RBC # UR: ABNORMAL /HPF
REF LAB TEST METHOD: ABNORMAL
SODIUM BLD-SCNC: 139 MMOL/L (ref 132–146)
SP GR UR STRIP: 1.08 (ref 1–1.03)
SP GR UR: 1.03 (ref 1–1.03)
SQUAMOUS #/AREA URNS HPF: ABNORMAL /HPF
UROBILINOGEN UR QL STRIP: ABNORMAL
UROBILINOGEN UR QL: ABNORMAL
WBC NRBC COR # BLD: 7.87 10*3/MM3 (ref 3.5–10.8)
WBC UR QL AUTO: ABNORMAL /HPF

## 2018-02-22 PROCEDURE — 99214 OFFICE O/P EST MOD 30 MIN: CPT | Performed by: FAMILY MEDICINE

## 2018-02-22 PROCEDURE — 74178 CT ABD&PLV WO CNTR FLWD CNTR: CPT

## 2018-02-22 PROCEDURE — 96372 THER/PROPH/DIAG INJ SC/IM: CPT | Performed by: FAMILY MEDICINE

## 2018-02-22 PROCEDURE — 81003 URINALYSIS AUTO W/O SCOPE: CPT | Performed by: FAMILY MEDICINE

## 2018-02-22 PROCEDURE — 80048 BASIC METABOLIC PNL TOTAL CA: CPT | Performed by: EMERGENCY MEDICINE

## 2018-02-22 PROCEDURE — 82565 ASSAY OF CREATININE: CPT

## 2018-02-22 PROCEDURE — 96375 TX/PRO/DX INJ NEW DRUG ADDON: CPT

## 2018-02-22 PROCEDURE — 81001 URINALYSIS AUTO W/SCOPE: CPT | Performed by: EMERGENCY MEDICINE

## 2018-02-22 PROCEDURE — 85025 COMPLETE CBC W/AUTO DIFF WBC: CPT | Performed by: EMERGENCY MEDICINE

## 2018-02-22 PROCEDURE — 96374 THER/PROPH/DIAG INJ IV PUSH: CPT

## 2018-02-22 PROCEDURE — 99283 EMERGENCY DEPT VISIT LOW MDM: CPT

## 2018-02-22 PROCEDURE — 25010000002 ONDANSETRON PER 1 MG: Performed by: EMERGENCY MEDICINE

## 2018-02-22 PROCEDURE — 0 IOPAMIDOL 61 % SOLUTION: Performed by: FAMILY MEDICINE

## 2018-02-22 PROCEDURE — 25010000002 FENTANYL CITRATE (PF) 100 MCG/2ML SOLUTION: Performed by: EMERGENCY MEDICINE

## 2018-02-22 RX ORDER — OXYCODONE HYDROCHLORIDE AND ACETAMINOPHEN 5; 325 MG/1; MG/1
TABLET ORAL
Refills: 0 | COMMUNITY
Start: 2018-02-09 | End: 2018-08-20

## 2018-02-22 RX ORDER — TIZANIDINE 4 MG/1
TABLET ORAL
Qty: 30 TABLET | Refills: 1 | Status: SHIPPED | OUTPATIENT
Start: 2018-02-22 | End: 2019-12-12 | Stop reason: SDUPTHER

## 2018-02-22 RX ORDER — SODIUM CHLORIDE 0.9 % (FLUSH) 0.9 %
10 SYRINGE (ML) INJECTION AS NEEDED
Status: DISCONTINUED | OUTPATIENT
Start: 2018-02-22 | End: 2018-02-22 | Stop reason: HOSPADM

## 2018-02-22 RX ORDER — ONDANSETRON 2 MG/ML
4 INJECTION INTRAMUSCULAR; INTRAVENOUS ONCE
Status: COMPLETED | OUTPATIENT
Start: 2018-02-22 | End: 2018-02-22

## 2018-02-22 RX ORDER — HYDROCODONE BITARTRATE AND ACETAMINOPHEN 7.5; 325 MG/1; MG/1
1 TABLET ORAL EVERY 6 HOURS PRN
Qty: 30 TABLET | Refills: 0 | Status: SHIPPED | OUTPATIENT
Start: 2018-02-22 | End: 2018-08-20

## 2018-02-22 RX ORDER — PROMETHAZINE HYDROCHLORIDE 12.5 MG/1
12.5 TABLET ORAL EVERY 6 HOURS PRN
Qty: 10 TABLET | Refills: 0 | Status: SHIPPED | OUTPATIENT
Start: 2018-02-22 | End: 2018-08-20

## 2018-02-22 RX ORDER — FENTANYL CITRATE 50 UG/ML
100 INJECTION, SOLUTION INTRAMUSCULAR; INTRAVENOUS AS NEEDED
Status: DISCONTINUED | OUTPATIENT
Start: 2018-02-22 | End: 2018-02-22 | Stop reason: HOSPADM

## 2018-02-22 RX ADMIN — IOPAMIDOL 95 ML: 612 INJECTION, SOLUTION INTRAVENOUS at 13:30

## 2018-02-22 RX ADMIN — FENTANYL CITRATE 100 MCG: 50 INJECTION INTRAMUSCULAR; INTRAVENOUS at 16:42

## 2018-02-22 RX ADMIN — ONDANSETRON 4 MG: 2 INJECTION INTRAMUSCULAR; INTRAVENOUS at 16:42

## 2018-02-22 NOTE — ED PROVIDER NOTES
Subjective   HPI Comments: Taina Moreno is a 67 y.o.female who presents to the ED with complaints of flank pain which developed on 2/9/18. She reports she initially developed 10/10 bilateral flank pain. She was seen in the [place] ED and diagnosed with bilateral kidney stones. She states she has since  passed the stone originating from her right kidney. However, her left sided flank pain remains persistent. She was seen by Michael Nation, her primary care provider today and had a CT scan performed showing evidence of a 3 x 4 cm left ureteral stone. She was referred to the ED for further evaluation. She rates her current pain a 7/10. She has been restless and unable to get comfortable as a result of the pain. She also complains of associated hematuria, nausea, vomiting, chills, diarrhea x 2-3 days, and allergy symptoms. She notes she has not had any diarrhea today. She denies fevers or any other complaints at this time.           Patient is a 67 y.o. female presenting with flank pain.   History provided by:  Patient  Flank Pain   Pain location:  L flank  Pain radiates to:  Does not radiate  Pain severity:  Moderate  Onset quality:  Sudden  Duration:  2 weeks  Timing:  Constant  Progression:  Unchanged  Chronicity:  New  Context comment:  Previously diagnosed with kidney stones   Relieved by:  Nothing  Worsened by:  Nothing  Associated symptoms: chills, diarrhea, hematuria, nausea and vomiting    Associated symptoms: no fever        Review of Systems   Constitutional: Positive for chills. Negative for fever.   HENT: Positive for rhinorrhea (and other allergy symptoms ).    Gastrointestinal: Positive for diarrhea, nausea and vomiting.   Genitourinary: Positive for flank pain and hematuria.   All other systems reviewed and are negative.      Past Medical History:   Diagnosis Date   • Anemia    • Backache    • Chronic bronchitis    • Depression    • Generalized osteoarthritis of multiple sites    • GERD  (gastroesophageal reflux disease)    • Gout    • Hx of migraine headaches    • Hypertension    • Low back ache    • Migraine    • Neck pain    • Reflux esophagitis    • Sleep disturbances    • Thoracic aneurysm without mention of rupture    • Urinary tract infection    • UTI (urinary tract infection)        Allergies   Allergen Reactions   • Fenofibrate Other (See Comments) and Myalgia   • Amlodipine Rash   • Hydroxyzine Palpitations   • Norvasc [Amlodipine Besylate] Rash   • Penicillins Rash       Past Surgical History:   Procedure Laterality Date   • GALLBLADDER SURGERY      anastomosis of gallbladder   • HYSTERECTOMY     • TONSILLECTOMY         Family History   Problem Relation Age of Onset   • Arthritis Mother    • Stroke Mother    • Hypertension Mother    • Heart attack Father    • Arthritis Father    • Diabetes Father    • Hypertension Father    • Kidney disease Father    • Hyperlipidemia Sister    • Migraines Sister    • Diabetes Brother    • Hyperlipidemia Brother    • Hypertension Brother    • Heart attack Other    • Stroke Other    • Cancer Other    • Stroke Other    • Obesity Other        Social History     Social History   • Marital status:      Spouse name: N/A   • Number of children: N/A   • Years of education: N/A     Social History Main Topics   • Smoking status: Former Smoker   • Smokeless tobacco: Never Used      Comment: 21 years old   • Alcohol use No   • Drug use: No   • Sexual activity: Not Asked     Other Topics Concern   • None     Social History Narrative         Objective   Physical Exam   Constitutional: She is oriented to person, place, and time. She appears well-developed and well-nourished. No distress.   HENT:   Head: Normocephalic and atraumatic.   Nose: Nose normal.   Mouth/Throat: Oropharynx is clear and moist.   Airway patent. Pharynx benign.   Eyes: Conjunctivae are normal. No scleral icterus.   Neck: Normal range of motion. Neck supple.   Cardiovascular: Normal rate,  regular rhythm and normal heart sounds.    No murmur heard.  Pulmonary/Chest: Effort normal and breath sounds normal. No respiratory distress.   Abdominal: Soft. Bowel sounds are normal. There is tenderness (Tenderness in the left CVA with palpation. Tenderness in the upper abdomen and left portion of the abdomen with some gaurding). There is guarding.   Musculoskeletal: Normal range of motion. She exhibits tenderness (Left lower back tenderness, primarily associated with her CVA tenderness). She exhibits no edema.   Lymphadenopathy:     She has no cervical adenopathy.   Neurological: She is alert and oriented to person, place, and time.   Skin: Skin is warm and dry.   Psychiatric: She has a normal mood and affect. Her behavior is normal.   Nursing note and vitals reviewed.      Procedures         ED Course  ED Course   Comment By Time   Old records reviewed. CT scan performed today showing evidence of a 3 x 4 cm left ureteral stone. No right renal stones identified. Danette Rai 02/22 1640   Reviewed [place] scans from 2/16/18. Danette Rai 02/22 1754     Recent Results (from the past 24 hour(s))   POC Urinalysis Dipstick, Automated    Collection Time: 02/22/18 11:51 AM   Result Value Ref Range    Color Orange (A) Yellow, Straw, Dark Yellow, Abyb    Clarity, UA Cloudy (A) Clear    Glucose, UA Negative Negative, 1000 mg/dL (3+) mg/dL    Bilirubin Small (1+) (A) Negative    Ketones, UA Negative Negative    Specific Gravity  1.030 1.005 - 1.030    Blood, UA 3+ (A) Negative    pH, Urine 5.5 5.0 - 8.0    Protein, POC 1+ (A) Negative mg/dL    Urobilinogen, UA 1 E.U./dL  (A) Normal    Leukocytes Trace (A) Negative    Nitrite, UA Negative Negative   POC Creatinine    Collection Time: 02/22/18 12:57 PM   Result Value Ref Range    Creatinine 0.90 0.60 - 1.30 mg/dL   Urinalysis With / Microscopic If Indicated - Urine, Clean Catch    Collection Time: 02/22/18  4:43 PM   Result Value Ref Range    Color, UA Yellow  Yellow, Straw    Appearance, UA Clear Clear    pH, UA <=5.0 5.0 - 8.0    Specific Gravity, UA 1.077 (H) 1.001 - 1.030    Glucose, UA Negative Negative    Ketones, UA Trace (A) Negative    Bilirubin, UA Negative Negative    Blood, UA Large (3+) (A) Negative    Protein, UA Negative Negative    Leuk Esterase, UA Negative Negative    Nitrite, UA Negative Negative    Urobilinogen, UA 1.0 E.U./dL 0.2 - 1.0 E.U./dL   Urinalysis, Microscopic Only - Urine, Clean Catch    Collection Time: 02/22/18  4:43 PM   Result Value Ref Range    RBC, UA Too Numerous to Count (A) None Seen, 0-2 /HPF    WBC, UA 3-5 (A) None Seen, 0-2 /HPF    Bacteria, UA None Seen None Seen, Trace /HPF    Squamous Epithelial Cells, UA 3-6 (A) None Seen, 0-2 /HPF    Hyaline Casts, UA 0-6 0 - 6 /LPF    Methodology Manual Light Microscopy    Basic Metabolic Panel    Collection Time: 02/22/18  4:50 PM   Result Value Ref Range    Glucose 194 (H) 70 - 100 mg/dL    BUN 12 9 - 23 mg/dL    Creatinine 1.10 0.60 - 1.30 mg/dL    Sodium 139 132 - 146 mmol/L    Potassium 3.6 3.5 - 5.5 mmol/L    Chloride 102 99 - 109 mmol/L    CO2 26.0 20.0 - 31.0 mmol/L    Calcium 9.3 8.7 - 10.4 mg/dL    eGFR  African Amer 60 (L) >60 mL/min/1.73    BUN/Creatinine Ratio 10.9 7.0 - 25.0    Anion Gap 11.0 3.0 - 11.0 mmol/L   CBC Auto Differential    Collection Time: 02/22/18  4:50 PM   Result Value Ref Range    WBC 7.87 3.50 - 10.80 10*3/mm3    RBC 4.21 3.89 - 5.14 10*6/mm3    Hemoglobin 13.1 11.5 - 15.5 g/dL    Hematocrit 38.5 34.5 - 44.0 %    MCV 91.4 80.0 - 99.0 fL    MCH 31.1 (H) 27.0 - 31.0 pg    MCHC 34.0 32.0 - 36.0 g/dL    RDW 12.0 11.3 - 14.5 %    RDW-SD 40.6 37.0 - 54.0 fl    MPV 10.6 6.0 - 12.0 fL    Platelets 277 150 - 450 10*3/mm3    Neutrophil % 54.1 41.0 - 71.0 %    Lymphocyte % 34.6 24.0 - 44.0 %    Monocyte % 8.4 0.0 - 12.0 %    Eosinophil % 2.3 0.0 - 3.0 %    Basophil % 0.5 0.0 - 1.0 %    Immature Grans % 0.1 0.0 - 0.6 %    Neutrophils, Absolute 4.26 1.50 - 8.30 10*3/mm3  "   Lymphocytes, Absolute 2.72 0.60 - 4.80 10*3/mm3    Monocytes, Absolute 0.66 0.00 - 1.00 10*3/mm3    Eosinophils, Absolute 0.18 0.00 - 0.30 10*3/mm3    Basophils, Absolute 0.04 0.00 - 0.20 10*3/mm3    Immature Grans, Absolute 0.01 0.00 - 0.03 10*3/mm3     Note: In addition to lab results from this visit, the labs listed above may include labs taken at another facility or during a different encounter within the last 24 hours. Please correlate lab times with ED admission and discharge times for further clarification of the services performed during this visit.    No orders to display     Vitals:    02/22/18 1613 02/22/18 1632 02/22/18 1649 02/22/18 1735   BP: (!) 183/88  136/71    BP Location: Right arm      Patient Position: Sitting      Pulse: 75   70   Resp: 16      Temp:  98.8 °F (37.1 °C)     TempSrc:  Oral     SpO2: 98%   94%   Weight: 81.6 kg (179 lb 12.8 oz)      Height: 165.1 cm (65\")        Medications   ondansetron (ZOFRAN) injection 4 mg (4 mg Intravenous Given 2/22/18 1642)     ECG/EMG Results (last 24 hours)     ** No results found for the last 24 hours. **                        [facility]    Final diagnoses:   Renal colic on left side   Ureterolithiasis       Documentation assistance provided by antonio Rai.  Information recorded by the scrshayne was done at my direction and has been verified and validated by me.     Danette Rai  02/22/18 4997       Danette Rai  02/22/18 1756       Yoni Ruelas MD  02/23/18 0020    "

## 2018-02-22 NOTE — PROGRESS NOTES
Subjective    Pain in L side low back pain, has been hurting since diagnosed w/ renal stones on 2-9-18.  Pt seen Dr. Rodriguez yesterday and he said, the pain could not be related and she does not want to go back to him.    Taina Moreno is a 67 y.o. female.     History of Present Illness     ED visit 2/9/18 - diagnosed with kidney stones.  Follow-up urology has not proved helpful.  Felt like she was simply being accused of wanting pain medicine.  Told that her findings on CT scan did not explain her pain.    Continues to have left flank low back pain.  Some tenderness to palpation.    Had noted some gross hematuria but does anxious and a little better over the last couple days but the pain has worsened.  No fever.  Some nausea secondary to pain but no vomiting.    Vero diabetes has been poorly controlled that she's been working on.    The following portions of the patient's history were reviewed and updated as appropriate: allergies, current medications, past medical history and problem list.    Review of Systems   Constitutional: Negative for fever.   HENT: Negative.    Respiratory: Negative.    Cardiovascular: Negative.    Gastrointestinal: Positive for nausea. Negative for diarrhea and vomiting.   Endocrine: Negative.    Genitourinary: Positive for frequency and hematuria.   Musculoskeletal: Positive for back pain.   Neurological: Negative for dizziness and headaches.   Psychiatric/Behavioral: Positive for sleep disturbance (due to pain).       Objective   Physical Exam   Constitutional: She appears well-developed and well-nourished. She appears distressed (uncomfortable and tearful).   Eyes: No scleral icterus.   Neck: Carotid bruit is not present.   Cardiovascular: Normal rate and regular rhythm.    Pulmonary/Chest: Effort normal and breath sounds normal. She has no wheezes.   Musculoskeletal: She exhibits tenderness. She exhibits no edema.   Some pain is reproducible on her left low thoracic upper lumbar  paraspinous musculature.  No bony tenderness.  Does not completely reproduce her pain.   Lymphadenopathy:     She has no cervical adenopathy.   Neurological: She is alert.   Skin: Skin is warm and dry. No rash noted.   Nursing note and vitals reviewed.    Urine dipstick shows positive for RBC's.  Micro exam: not done.      Assessment/Plan   Taina was seen today for pain in l side low back pain.    Diagnoses and all orders for this visit:    Nephrolithiasis  -     ketorolac (TORADOL) injection 60 mg; Inject 2 mL into the shoulder, thigh, or buttocks 1 (One) Time.  -     CT abdomen pelvis w wo contrast; Future  -     ketorolac (TORADOL) injection 60 mg; Inject 2 mL into the shoulder, thigh, or buttocks 1 (One) Time.    Acute left-sided low back pain without sciatica  -     ketorolac (TORADOL) injection 60 mg; Inject 2 mL into the shoulder, thigh, or buttocks 1 (One) Time.  -     tiZANidine (ZANAFLEX) 4 MG tablet; 1-2 po q 8 hours prn spasm  -     HYDROcodone-acetaminophen (NORCO) 7.5-325 MG per tablet; Take 1 tablet by mouth Every 6 (Six) Hours As Needed for Moderate Pain .  -     CT abdomen pelvis w wo contrast; Future  -     ketorolac (TORADOL) injection 60 mg; Inject 2 mL into the shoulder, thigh, or buttocks 1 (One) Time.    Gross hematuria  -     POC Urinalysis Dipstick, Automated  -     Urine Culture - Urine, Urine, Clean Catch  -     CT abdomen pelvis w wo contrast; Future    Type 2 diabetes mellitus with microalbuminuria, without long-term current use of insulin    Toradol as noted above.  Recommended some Zanaflex for muscle spasm.  Given that she still has persistent hematuria and severe pain have recommended a stat CT scan of the pelvis and abdomen.  The plan after those results are available.    Hydrocodone given for pain relief.  MICHELLE unrevealing

## 2018-02-23 ENCOUNTER — EPISODE CHANGES (OUTPATIENT)
Dept: CASE MANAGEMENT | Facility: OTHER | Age: 68
End: 2018-02-23

## 2018-02-23 LAB
BACTERIA UR CULT: NO GROWTH
BACTERIA UR CULT: NORMAL

## 2018-02-26 ENCOUNTER — PATIENT OUTREACH (OUTPATIENT)
Dept: CASE MANAGEMENT | Facility: OTHER | Age: 68
End: 2018-02-26

## 2018-02-26 NOTE — OUTREACH NOTE
Talked with patient. Discussed 2/22/18 ED visit regarding left ureteral stone. Patient states symptoms have improved; feeling better and compliance with ED recommendations.  Patient is able to tolerate fluids and solid food; vut having difficulty sleeping.  Has appointment with urologist. 2/27/18. Patient lives with her mother; independent with ADL's; meal preparation; housekeeping; shopping and transportation. At present she is receiving assistance due to taking pain medication. Patient states to be compliant with medications; medical appointments;monitoring of blood pressure and blood sugars. Medicare Annual Wellness Visit has been completed. Reviewed with patient gaps in care; 24/7 Nurse Line Telephone number and HRCM program. She verbalized understanding. No questions or concerns voiced at this time.

## 2018-03-08 ENCOUNTER — PATIENT OUTREACH (OUTPATIENT)
Dept: CASE MANAGEMENT | Facility: OTHER | Age: 68
End: 2018-03-08

## 2018-03-08 RX ORDER — ALLOPURINOL 100 MG/1
TABLET ORAL
Qty: 60 TABLET | Refills: 5 | Status: SHIPPED | OUTPATIENT
Start: 2018-03-08 | End: 2019-03-25 | Stop reason: SDUPTHER

## 2018-03-08 NOTE — OUTREACH NOTE
Talked with patient. Patient states to have had stent removed 3/7/18 by urologist and reports chills and bladder spasms. CA advised and offered to assist patient in contact of urologist and PCP for recommendations. At this time patient declined this.and states she will contact physician as needed. Has follow up appointment April 6, 2018. Patient is receiving assistance from family at this time and will purchase thermometer to check for fever. No further questions or concerns voiced at this time.

## 2018-03-12 ENCOUNTER — PATIENT OUTREACH (OUTPATIENT)
Dept: CASE MANAGEMENT | Facility: OTHER | Age: 68
End: 2018-03-12

## 2018-03-12 NOTE — OUTREACH NOTE
Talked with patient. States she states to have pain to left side;back; and decreased appetite. She  has contacted Dr. Pierce's office and received recommendations. CA advised patient to contact PCP for recommendations. She verbalized understanding and states she will do so. No further questions or concerns voiced at this time.

## 2018-04-12 RX ORDER — DILTIAZEM HYDROCHLORIDE 120 MG/1
TABLET, FILM COATED ORAL
Qty: 360 TABLET | Refills: 1 | Status: SHIPPED | OUTPATIENT
Start: 2018-04-12 | End: 2019-04-01 | Stop reason: SDUPTHER

## 2018-04-17 ENCOUNTER — PATIENT OUTREACH (OUTPATIENT)
Dept: CASE MANAGEMENT | Facility: OTHER | Age: 68
End: 2018-04-17

## 2018-04-17 NOTE — OUTREACH NOTE
Talked with patient. Patient reports improvement of pain to left side; back pain and bladder spasms. Bladder spasms have decreased but continue . She reports left side sensation after drinking and eating;  episodes of bloating; and difficulty sleeping on left side.  She states no burning; or pain with voiding but has difficulty with completely emptying bladder. Patient has follow up appointment with Dr. Pierce on May 1 and will have KUB and ultrasound. Patient states she is independent with ADL's and is able to drive short distances. She is compliant with medications; monitoring of blood pressure and blood sugars. States blood pressure readings are within normal limits and blood sugars have been fluctuating between 85 to 185. Reviewed with patient low carb diet; benefits of diabetic educator and follow up with physicians. Patient states she will see diabetic educator at [facility] and schedule her follow up appointments. No further questions or concerns voiced at this time.

## 2018-05-15 RX ORDER — ESTRADIOL 1 MG/1
TABLET ORAL
Qty: 30 TABLET | Refills: 2 | Status: SHIPPED | OUTPATIENT
Start: 2018-05-15 | End: 2018-09-28 | Stop reason: SDUPTHER

## 2018-06-22 RX ORDER — GLIMEPIRIDE 1 MG/1
TABLET ORAL
Qty: 60 TABLET | Refills: 0 | Status: SHIPPED | OUTPATIENT
Start: 2018-06-22 | End: 2018-09-01 | Stop reason: SDUPTHER

## 2018-08-08 DIAGNOSIS — I10 ESSENTIAL HYPERTENSION: ICD-10-CM

## 2018-08-09 RX ORDER — TORSEMIDE 5 MG/1
TABLET ORAL
Qty: 30 TABLET | Refills: 2 | OUTPATIENT
Start: 2018-08-09

## 2018-08-09 RX ORDER — IRBESARTAN 150 MG/1
TABLET ORAL
Qty: 90 TABLET | Refills: 1 | OUTPATIENT
Start: 2018-08-09

## 2018-08-10 ENCOUNTER — TELEPHONE (OUTPATIENT)
Dept: FAMILY MEDICINE CLINIC | Facility: CLINIC | Age: 68
End: 2018-08-10

## 2018-08-10 DIAGNOSIS — I10 ESSENTIAL HYPERTENSION: ICD-10-CM

## 2018-08-10 NOTE — TELEPHONE ENCOUNTER
----- Message from Katie Lloyd sent at 8/10/2018 11:38 AM EDT -----  Contact: [facility] ; MED REFILL   MED REFILL     Irbesartan 150 MG TAKE 1 TABLET BY MOUTH ONCE DAILY ##NEED  TO  MAKE  A  FOLLOW  UP  APPOINTMENT##      Torsemide 5 MG TAKE ONE TABLET BY MOUTH ONCE DAILY         PHARM ON FILE

## 2018-08-10 NOTE — TELEPHONE ENCOUNTER
I wanted to send this to you only because she has seen cardio recently, she does have a fu appt sched with you.

## 2018-08-12 RX ORDER — IRBESARTAN 150 MG/1
TABLET ORAL
Qty: 30 TABLET | Refills: 0 | Status: SHIPPED | OUTPATIENT
Start: 2018-08-12 | End: 2018-08-20 | Stop reason: SDUPTHER

## 2018-08-12 RX ORDER — TORSEMIDE 5 MG/1
5 TABLET ORAL DAILY
Qty: 30 TABLET | Refills: 0 | Status: SHIPPED | OUTPATIENT
Start: 2018-08-12 | End: 2018-12-29 | Stop reason: SDUPTHER

## 2018-08-17 NOTE — PROGRESS NOTES
Subjective   Taina Moreno is a 68 y.o. female.     Chief Complaint   Patient presents with   • Follow-up     NOTE: pt has had iced tea today   • Diabetes   • Hyperlipidemia   • Hypertension   • elevated LFT's   • B12 def   • Depression   • Fibromyalgia       History of Present Illness     Patient returns today for follow-up of chronic medical conditions as noted above.    Last seen in Feb - had asked her to return in just 3 months as her diabetes had worsened and I had wanted to start Lantus.    Since that visit, her sugars have been running - quite good overall; 90s-198 in general. Many more less than 150 than what she had been getting.    Neuropathic pain/numbness is unchanged.  Not limiting her.  Tries to check her feet on a daily basis.    Overall has been feeling pretty good except for AR - but overdue to follow up on a number of items.    Had kidney stone removed / passed this spring. Was to do a 24 hour urine for her nephrologist; left sided aching - concerned about a new stone forming.  Has not completed a 24 hour urine yet.    Due for Prevnar, colon cancer screening (made two separate referrals that she did not follow through on), eye exam and Zoster vaccine; she is concerned mostly with the colonoscopy prep.  We discussed the possibility of using Cologuard as an alternative if she was willing to undergo a colonoscopy if it returned positive.    The following portions of the patient's history were reviewed and updated as appropriate: allergies, current medications, past medical history and problem list.    Review of Systems   Constitutional: Negative.  Negative for fever.   HENT: Negative.    Eyes: Negative.  Negative for blurred vision.   Respiratory: Negative.  Negative for shortness of breath.    Cardiovascular: Positive for leg swelling (unchanged / minor). Negative for chest pain.   Gastrointestinal: Negative.    Endocrine: Negative for polydipsia and polyuria.   Genitourinary: Negative.     Musculoskeletal: Positive for arthralgias (generalized).   Skin: Negative.    Neurological: Positive for numbness.   Psychiatric/Behavioral: Positive for sleep disturbance. The patient is nervous/anxious.        Objective   Physical Exam   Constitutional: She appears well-developed. No distress.   HENT:   Mouth/Throat: Oropharynx is clear and moist.   Eyes: No scleral icterus.   Neck: Neck supple. Carotid bruit is not present.   Cardiovascular: Normal rate and regular rhythm.    No murmur heard.  Pulmonary/Chest: Effort normal and breath sounds normal.   Musculoskeletal: She exhibits no edema.    Taina had a diabetic foot exam performed today.  Neurological: She is alert. A sensory deficit is present. She exhibits normal muscle tone.   Skin: Skin is warm and dry.   Psychiatric: She has a normal mood and affect. Her behavior is normal.   Nursing note and vitals reviewed.        Assessment/Plan   Taina was seen today for follow-up, diabetes, hyperlipidemia, hypertension, elevated lft's, b12 def, depression and fibromyalgia.    Diagnoses and all orders for this visit:    Type 2 diabetes mellitus with microalbuminuria, without long-term current use of insulin (CMS/[facility])  -     Hemoglobin A1c  -     MicroAlbumin, Urine, Random - Urine, Clean Catch    Essential hypertension  -     Comprehensive Metabolic Panel  -     irbesartan (AVAPRO) 150 MG tablet; One po q day    Mixed hyperlipidemia  -     Lipid Panel With LDL / HDL Ratio    Gastroesophageal reflux disease without esophagitis    Cobalamin deficiency    Abnormal liver enzymes  -     Comprehensive Metabolic Panel    Encounter for immunization  -     Pneumococcal Conjugate Vaccine 13-[employer] All  -     Zoster Vac Recomb Adjuvanted (SHINGRIX) 50 MCG reconstituted suspension; Inject 50 mcg into the appropriate muscle as directed by prescriber 1 (One) Time for 1 dose.    Allergic rhinitis due to pollen, unspecified chronicity, unspecified seasonality  -     fluticasone  (FLONASE) 50 MCG/ACT nasal spray; 2 sprays into the nostril(s) as directed by provider Daily.  -     loratadine (CLARITIN) 10 MG tablet; Take 1 tablet by mouth Daily.    Acute left-sided low back pain without sciatica  Comments:  Given history of stone, check KUB and refer back to nephrologist  Orders:  -     XR Abdomen KUB    Colon cancer screening  -     Cologuard - Stool, Per Rectum; Future    Urged her to have more frequent visits - difficult to help with significant change once or twice a year although I think were making some progress.

## 2018-08-20 ENCOUNTER — HOSPITAL ENCOUNTER (OUTPATIENT)
Dept: GENERAL RADIOLOGY | Facility: HOSPITAL | Age: 68
Discharge: HOME | End: 2018-08-20
Attending: FAMILY MEDICINE | Admitting: FAMILY MEDICINE

## 2018-08-20 ENCOUNTER — RESULTS ENCOUNTER (OUTPATIENT)
Dept: FAMILY MEDICINE CLINIC | Facility: CLINIC | Age: 68
End: 2018-08-20

## 2018-08-20 ENCOUNTER — OFFICE VISIT (OUTPATIENT)
Dept: FAMILY MEDICINE CLINIC | Facility: CLINIC | Age: 68
End: 2018-08-20

## 2018-08-20 VITALS
TEMPERATURE: 98.2 F | HEIGHT: 65 IN | SYSTOLIC BLOOD PRESSURE: 120 MMHG | WEIGHT: 177.2 LBS | BODY MASS INDEX: 29.52 KG/M2 | DIASTOLIC BLOOD PRESSURE: 70 MMHG | RESPIRATION RATE: 16 BRPM | HEART RATE: 76 BPM

## 2018-08-20 DIAGNOSIS — E11.29 TYPE 2 DIABETES MELLITUS WITH MICROALBUMINURIA, WITHOUT LONG-TERM CURRENT USE OF INSULIN (HCC): Primary | ICD-10-CM

## 2018-08-20 DIAGNOSIS — R74.8 ABNORMAL LIVER ENZYMES: ICD-10-CM

## 2018-08-20 DIAGNOSIS — Z12.11 COLON CANCER SCREENING: ICD-10-CM

## 2018-08-20 DIAGNOSIS — M54.50 ACUTE LEFT-SIDED LOW BACK PAIN WITHOUT SCIATICA: ICD-10-CM

## 2018-08-20 DIAGNOSIS — R80.9 TYPE 2 DIABETES MELLITUS WITH MICROALBUMINURIA, WITHOUT LONG-TERM CURRENT USE OF INSULIN (HCC): Primary | ICD-10-CM

## 2018-08-20 DIAGNOSIS — E53.8 COBALAMIN DEFICIENCY: ICD-10-CM

## 2018-08-20 DIAGNOSIS — J30.1 ALLERGIC RHINITIS DUE TO POLLEN, UNSPECIFIED CHRONICITY, UNSPECIFIED SEASONALITY: ICD-10-CM

## 2018-08-20 DIAGNOSIS — E78.2 MIXED HYPERLIPIDEMIA: ICD-10-CM

## 2018-08-20 DIAGNOSIS — Z23 ENCOUNTER FOR IMMUNIZATION: ICD-10-CM

## 2018-08-20 DIAGNOSIS — K21.9 GASTROESOPHAGEAL REFLUX DISEASE WITHOUT ESOPHAGITIS: ICD-10-CM

## 2018-08-20 DIAGNOSIS — I10 ESSENTIAL HYPERTENSION: ICD-10-CM

## 2018-08-20 PROCEDURE — G0009 ADMIN PNEUMOCOCCAL VACCINE: HCPCS | Performed by: FAMILY MEDICINE

## 2018-08-20 PROCEDURE — 99214 OFFICE O/P EST MOD 30 MIN: CPT | Performed by: FAMILY MEDICINE

## 2018-08-20 PROCEDURE — 90670 PCV13 VACCINE IM: CPT | Performed by: FAMILY MEDICINE

## 2018-08-20 PROCEDURE — 74018 RADEX ABDOMEN 1 VIEW: CPT

## 2018-08-20 RX ORDER — LORATADINE 10 MG/1
10 TABLET ORAL DAILY
Qty: 30 TABLET | Refills: 3 | Status: SHIPPED | OUTPATIENT
Start: 2018-08-20

## 2018-08-20 RX ORDER — IRBESARTAN 150 MG/1
TABLET ORAL
Qty: 90 TABLET | Refills: 3 | Status: SHIPPED | OUTPATIENT
Start: 2018-08-20 | End: 2019-09-18 | Stop reason: SDUPTHER

## 2018-08-20 RX ORDER — FLUTICASONE PROPIONATE 50 MCG
2 SPRAY, SUSPENSION (ML) NASAL DAILY
Qty: 16 G | Refills: 3 | Status: SHIPPED | OUTPATIENT
Start: 2018-08-20 | End: 2019-06-13 | Stop reason: SDUPTHER

## 2018-08-21 LAB
ALBUMIN SERPL-MCNC: 4.79 G/DL (ref 3.2–4.8)
ALBUMIN/GLOB SERPL: 1.6 G/DL (ref 1.5–2.5)
ALP SERPL-CCNC: 97 U/L (ref 25–100)
ALT SERPL-CCNC: 44 U/L (ref 7–40)
AST SERPL-CCNC: 36 U/L (ref 0–33)
BILIRUB SERPL-MCNC: 1.1 MG/DL (ref 0.3–1.2)
BUN SERPL-MCNC: 10 MG/DL (ref 9–23)
BUN/CREAT SERPL: 13.7 (ref 7–25)
CALCIUM SERPL-MCNC: 9.7 MG/DL (ref 8.7–10.4)
CHLORIDE SERPL-SCNC: 98 MMOL/L (ref 99–109)
CHOLEST SERPL-MCNC: 124 MG/DL (ref 0–200)
CO2 SERPL-SCNC: 27 MMOL/L (ref 20–31)
CREAT SERPL-MCNC: 0.73 MG/DL (ref 0.6–1.3)
GLOBULIN SER CALC-MCNC: 2.9 GM/DL
GLUCOSE SERPL-MCNC: 103 MG/DL (ref 70–100)
HBA1C MFR BLD: 7.1 % (ref 4.8–5.6)
HDLC SERPL-MCNC: 41 MG/DL (ref 40–60)
LDLC SERPL CALC-MCNC: 41 MG/DL (ref 0–100)
LDLC/HDLC SERPL: 0.99 {RATIO}
MICROALBUMIN UR-MCNC: 4.4 UG/ML
POTASSIUM SERPL-SCNC: 4 MMOL/L (ref 3.5–5.5)
PROT SERPL-MCNC: 7.7 G/DL (ref 5.7–8.2)
SODIUM SERPL-SCNC: 135 MMOL/L (ref 132–146)
TRIGL SERPL-MCNC: 212 MG/DL (ref 0–150)
VLDLC SERPL CALC-MCNC: 42.4 MG/DL

## 2018-08-26 DIAGNOSIS — R51.9 HEADACHE, UNSPECIFIED HEADACHE TYPE: ICD-10-CM

## 2018-08-27 RX ORDER — TRAMADOL HYDROCHLORIDE 50 MG/1
TABLET ORAL
Qty: 20 TABLET | Refills: 0 | OUTPATIENT
Start: 2018-08-27 | End: 2018-12-10 | Stop reason: SDUPTHER

## 2018-09-04 RX ORDER — GLIMEPIRIDE 1 MG/1
TABLET ORAL
Qty: 60 TABLET | Refills: 2 | Status: SHIPPED | OUTPATIENT
Start: 2018-09-04 | End: 2018-12-13 | Stop reason: SDUPTHER

## 2018-09-29 RX ORDER — ESTRADIOL 1 MG/1
TABLET ORAL
Qty: 30 TABLET | Refills: 2 | Status: SHIPPED | OUTPATIENT
Start: 2018-09-29 | End: 2018-12-13 | Stop reason: SDUPTHER

## 2018-10-05 ENCOUNTER — TELEPHONE (OUTPATIENT)
Dept: FAMILY MEDICINE CLINIC | Facility: CLINIC | Age: 68
End: 2018-10-05

## 2018-10-05 NOTE — TELEPHONE ENCOUNTER
----- Message from Ciara Quick sent at 10/5/2018  9:32 AM EDT -----  PATIENT IS CALLING FOR HER COLOGUARD RESULTS PLEASE CALL BACK AT [phone] PLEASE LEAVE A MESSAGE

## 2018-10-17 ENCOUNTER — EPISODE CHANGES (OUTPATIENT)
Dept: CASE MANAGEMENT | Facility: OTHER | Age: 68
End: 2018-10-17

## 2018-12-06 NOTE — PROGRESS NOTES
Subjective   Taina Moreno is a 68 y.o. female.     Chief Complaint   Patient presents with   • Follow-up     NOTE: pt is NOT fasting today   • Hyperlipidemia   • Hypertension   • elevated LFT   • B12 def   • Depression   • Fibromyalgia     RF: Tramadol   • Diabetes     History of Present Illness     Patient returns today for follow-up of chronic medical conditions as noted above.      Comments from labs last visit:   LFTs are a little better  Diabetes is a little better as well  Watch the bread, pasta, crackers and other processed foods.  Try to eat more vegetables, nuts, salads and good fats.    This OV:  Since last visit has generally been feeling okay but the last week or so she has been lightheaded/ dizzy - has awoken with it; some right sided neck pain. BP and blood sugar checked - okay; change in head position seems to trigger the dizziness    Checking blood sugars - no  Diet - minimal changes since last visit  Activity - nothing organized  Sleep - not resting well due to her neck    Cardiology - heart checked out good (Daniel)    Cologuard was ordered - did not complete.  Did have eye exam.    Saw her urologist last 10/30 for f/u nephrolithiasis     The following portions of the patient's history were reviewed and updated as appropriate: allergies, current medications, past medical history, past social history and problem list.    Review of Systems   Constitutional: Negative.  Negative for fever.   HENT: Negative.    Eyes: Negative.  Negative for blurred vision.   Respiratory: Negative.  Negative for shortness of breath.    Cardiovascular: Positive for leg swelling (unchanged / minor). Negative for chest pain.   Gastrointestinal: Negative.    Endocrine: Negative for polydipsia and polyuria.   Genitourinary: Negative.    Musculoskeletal: Positive for arthralgias (generalized).   Skin: Negative.    Neurological: Positive for dizziness and numbness. Negative for tremors, weakness and headache.    Psychiatric/Behavioral: Positive for sleep disturbance. The patient is nervous/anxious.        Objective   Physical Exam   Constitutional: She appears well-developed. No distress.   HENT:   Mouth/Throat: Oropharynx is clear and moist.   Neck: Neck supple. Carotid bruit is not present.   Cardiovascular: Normal rate and regular rhythm.   No murmur heard.  Pulmonary/Chest: Effort normal and breath sounds normal.   Musculoskeletal: She exhibits no edema.   Neurological: She is alert.   Skin: Skin is warm and dry.   Psychiatric: She has a normal mood and affect. Her behavior is normal.   Nursing note and vitals reviewed.      Assessment/Plan   Taina was seen today for follow-up, hyperlipidemia, hypertension, elevated lft, b12 def, depression, fibromyalgia and diabetes.    Diagnoses and all orders for this visit:    Type 2 diabetes mellitus with microalbuminuria, without long-term current use of insulin (CMS/[facility])  -     Hemoglobin A1c    Essential hypertension  -     Comprehensive Metabolic Panel    Mixed hyperlipidemia  -     Lipid Panel With LDL / HDL Ratio    Abnormal liver enzymes  -     Comprehensive Metabolic Panel    Cobalamin deficiency  Comments:  Next level recheck in spring    Encounter for immunization  -     Zoster Vac Recomb Adjuvanted (SHINGRIX) 50 MCG/0.5ML reconstituted suspension; Inject 50 mcg into the appropriate muscle as directed by prescriber 1 (One) Time for 1 dose.    Headache, unspecified headache type  -     traMADol (ULTRAM) 50 MG tablet; Take 1 tablet by mouth 2 (Two) Times a Day As Needed for Moderate Pain .    Fibromyalgia    Benign paroxysmal positional vertigo, unspecified laterality  -     meclizine (ANTIVERT) 25 MG tablet; Take 1 tablet by mouth 3 (Three) Times a Day As Needed for dizziness.    To let me know if her BPV symptoms aren't improving/resolving over the next 5-7 days.  Would consider a brief course of steroids.  Explained I would like to avoid that given her diabetes if  possible.  Alternatively, could send her to physical therapy for Epley maneuver.    Urged more regular activity once her dizziness clears.  Some gentle range of motion exercises for her neck.  Consider physical therapy if not improving.  Would not rely on medication.

## 2018-12-10 ENCOUNTER — OFFICE VISIT (OUTPATIENT)
Dept: FAMILY MEDICINE CLINIC | Facility: CLINIC | Age: 68
End: 2018-12-10

## 2018-12-10 VITALS
RESPIRATION RATE: 16 BRPM | DIASTOLIC BLOOD PRESSURE: 78 MMHG | WEIGHT: 180.6 LBS | BODY MASS INDEX: 30.09 KG/M2 | HEART RATE: 80 BPM | HEIGHT: 65 IN | SYSTOLIC BLOOD PRESSURE: 150 MMHG | TEMPERATURE: 98.2 F

## 2018-12-10 DIAGNOSIS — E53.8 COBALAMIN DEFICIENCY: ICD-10-CM

## 2018-12-10 DIAGNOSIS — H81.10 BENIGN PAROXYSMAL POSITIONAL VERTIGO, UNSPECIFIED LATERALITY: ICD-10-CM

## 2018-12-10 DIAGNOSIS — R74.8 ABNORMAL LIVER ENZYMES: ICD-10-CM

## 2018-12-10 DIAGNOSIS — E78.2 MIXED HYPERLIPIDEMIA: ICD-10-CM

## 2018-12-10 DIAGNOSIS — E11.29 TYPE 2 DIABETES MELLITUS WITH MICROALBUMINURIA, WITHOUT LONG-TERM CURRENT USE OF INSULIN (HCC): Primary | ICD-10-CM

## 2018-12-10 DIAGNOSIS — M79.7 FIBROMYALGIA: ICD-10-CM

## 2018-12-10 DIAGNOSIS — R51.9 HEADACHE, UNSPECIFIED HEADACHE TYPE: ICD-10-CM

## 2018-12-10 DIAGNOSIS — I10 ESSENTIAL HYPERTENSION: ICD-10-CM

## 2018-12-10 DIAGNOSIS — R80.9 TYPE 2 DIABETES MELLITUS WITH MICROALBUMINURIA, WITHOUT LONG-TERM CURRENT USE OF INSULIN (HCC): Primary | ICD-10-CM

## 2018-12-10 DIAGNOSIS — Z23 ENCOUNTER FOR IMMUNIZATION: ICD-10-CM

## 2018-12-10 PROCEDURE — 99214 OFFICE O/P EST MOD 30 MIN: CPT | Performed by: FAMILY MEDICINE

## 2018-12-10 RX ORDER — TRAMADOL HYDROCHLORIDE 50 MG/1
50 TABLET ORAL 2 TIMES DAILY PRN
Qty: 20 TABLET | Refills: 0 | Status: SHIPPED | OUTPATIENT
Start: 2018-12-10 | End: 2020-06-02

## 2018-12-10 RX ORDER — MECLIZINE HYDROCHLORIDE 25 MG/1
25 TABLET ORAL 3 TIMES DAILY PRN
Qty: 30 TABLET | Refills: 1 | Status: SHIPPED | OUTPATIENT
Start: 2018-12-10 | End: 2020-02-17

## 2018-12-11 LAB
ALBUMIN SERPL-MCNC: 4.82 G/DL (ref 3.2–4.8)
ALBUMIN/GLOB SERPL: 1.9 G/DL (ref 1.5–2.5)
ALP SERPL-CCNC: 93 U/L (ref 25–100)
ALT SERPL-CCNC: 42 U/L (ref 7–40)
AST SERPL-CCNC: 38 U/L (ref 0–33)
BILIRUB SERPL-MCNC: 0.7 MG/DL (ref 0.3–1.2)
BUN SERPL-MCNC: 11 MG/DL (ref 9–23)
BUN/CREAT SERPL: 15.7 (ref 7–25)
CALCIUM SERPL-MCNC: 9.4 MG/DL (ref 8.7–10.4)
CHLORIDE SERPL-SCNC: 102 MMOL/L (ref 99–109)
CHOLEST SERPL-MCNC: 129 MG/DL (ref 0–200)
CO2 SERPL-SCNC: 28 MMOL/L (ref 20–31)
CREAT SERPL-MCNC: 0.7 MG/DL (ref 0.6–1.3)
GLOBULIN SER CALC-MCNC: 2.6 GM/DL
GLUCOSE SERPL-MCNC: 114 MG/DL (ref 70–100)
HBA1C MFR BLD: 7.1 % (ref 4.8–5.6)
HDLC SERPL-MCNC: 43 MG/DL (ref 40–60)
LDLC SERPL CALC-MCNC: 44 MG/DL (ref 0–100)
LDLC/HDLC SERPL: 1.02 {RATIO}
POTASSIUM SERPL-SCNC: 4 MMOL/L (ref 3.5–5.5)
PROT SERPL-MCNC: 7.4 G/DL (ref 5.7–8.2)
SODIUM SERPL-SCNC: 138 MMOL/L (ref 132–146)
TRIGL SERPL-MCNC: 210 MG/DL (ref 0–150)
VLDLC SERPL CALC-MCNC: 42 MG/DL

## 2018-12-13 RX ORDER — ESTRADIOL 1 MG/1
TABLET ORAL
Qty: 90 TABLET | Refills: 0 | Status: SHIPPED | OUTPATIENT
Start: 2018-12-13 | End: 2019-03-25 | Stop reason: SDUPTHER

## 2018-12-13 RX ORDER — GLIMEPIRIDE 1 MG/1
TABLET ORAL
Qty: 180 TABLET | Refills: 0 | Status: SHIPPED | OUTPATIENT
Start: 2018-12-13 | End: 2019-03-25 | Stop reason: SDUPTHER

## 2018-12-29 DIAGNOSIS — I10 ESSENTIAL HYPERTENSION: ICD-10-CM

## 2018-12-31 RX ORDER — TORSEMIDE 5 MG/1
TABLET ORAL
Qty: 30 TABLET | Refills: 2 | Status: SHIPPED | OUTPATIENT
Start: 2018-12-31 | End: 2019-07-25 | Stop reason: SDUPTHER

## 2019-03-25 RX ORDER — GLIMEPIRIDE 1 MG/1
TABLET ORAL
Qty: 180 TABLET | Refills: 0 | Status: SHIPPED | OUTPATIENT
Start: 2019-03-25 | End: 2019-07-25 | Stop reason: SDUPTHER

## 2019-03-25 RX ORDER — ESTRADIOL 1 MG/1
TABLET ORAL
Qty: 90 TABLET | Refills: 0 | Status: SHIPPED | OUTPATIENT
Start: 2019-03-25 | End: 2019-07-14 | Stop reason: SDUPTHER

## 2019-03-26 RX ORDER — ALLOPURINOL 100 MG/1
TABLET ORAL
Qty: 60 TABLET | Refills: 5 | Status: SHIPPED | OUTPATIENT
Start: 2019-03-26 | End: 2020-04-20

## 2019-04-01 RX ORDER — DILTIAZEM HYDROCHLORIDE 120 MG/1
TABLET, FILM COATED ORAL
Qty: 360 TABLET | Refills: 0 | Status: SHIPPED | OUTPATIENT
Start: 2019-04-01 | End: 2019-12-12 | Stop reason: SDUPTHER

## 2019-06-13 ENCOUNTER — OFFICE VISIT (OUTPATIENT)
Dept: FAMILY MEDICINE CLINIC | Facility: CLINIC | Age: 69
End: 2019-06-13

## 2019-06-13 VITALS
BODY MASS INDEX: 29.29 KG/M2 | DIASTOLIC BLOOD PRESSURE: 86 MMHG | TEMPERATURE: 98.2 F | WEIGHT: 176 LBS | HEART RATE: 62 BPM | SYSTOLIC BLOOD PRESSURE: 142 MMHG | RESPIRATION RATE: 18 BRPM

## 2019-06-13 DIAGNOSIS — H69.83 DYSFUNCTION OF BOTH EUSTACHIAN TUBES: ICD-10-CM

## 2019-06-13 DIAGNOSIS — B37.2 SKIN CANDIDIASIS: Primary | ICD-10-CM

## 2019-06-13 DIAGNOSIS — M67.439 GANGLION OF WRIST, UNSPECIFIED LATERALITY: ICD-10-CM

## 2019-06-13 PROCEDURE — 99214 OFFICE O/P EST MOD 30 MIN: CPT | Performed by: FAMILY MEDICINE

## 2019-06-13 RX ORDER — FLUTICASONE PROPIONATE 50 MCG
2 SPRAY, SUSPENSION (ML) NASAL DAILY
Qty: 15.8 ML | Refills: 0 | Status: SHIPPED | OUTPATIENT
Start: 2019-06-13 | End: 2021-02-01 | Stop reason: SDUPTHER

## 2019-06-13 RX ORDER — OXYBUTYNIN CHLORIDE 5 MG/1
1 TABLET ORAL DAILY
Refills: 2 | COMMUNITY
Start: 2019-03-25 | End: 2022-08-12 | Stop reason: SDUPTHER

## 2019-06-13 RX ORDER — NYSTATIN 100000 [USP'U]/G
POWDER TOPICAL 3 TIMES DAILY
Qty: 45 G | Refills: 0 | Status: SHIPPED | OUTPATIENT
Start: 2019-06-13 | End: 2020-01-03

## 2019-06-13 RX ORDER — FLUCONAZOLE 150 MG/1
150 TABLET ORAL ONCE
Qty: 1 TABLET | Refills: 0 | Status: SHIPPED | OUTPATIENT
Start: 2019-06-13 | End: 2019-06-13

## 2019-06-13 NOTE — PROGRESS NOTES
Subjective   Taina Moreno is a 69 y.o. female.   Chief Complaint   Patient presents with   • Cyst     right right finger and left wrist    • Earache     sinus drainiage x 1 week    • Rash     pelvic region x 1 week      History of Present Illness   She has a cyst on bilateral wrist, most recently appeared on her left wrist. Cysts are not causing any pain, numbness, tingling, or limited ROM.   Also, a nodule right 4th digit. Last night, area was painful and swollen. Not treated with anything yet. ROM of 4th digit is normal.     Sinus congestion and bilateral earache x 1-2 weeks.   Treating with OTC allergy treatment.  +PND    Rash in her groin region, very itchy and burning. Symptoms present x 1 week.   Treating with Desitin and medicated powder, which helped some but not resolved.  No vaginal discharge.     The following portions of the patient's history were reviewed and updated as appropriate: allergies, current medications, past family history, past medical history, past social history, past surgical history and problem list.    Review of Systems   Constitutional: Negative for chills and fever.   HENT: Positive for congestion, ear pain, postnasal drip and sinus pressure.    Respiratory: Negative for cough and shortness of breath.    Cardiovascular: Negative for chest pain.   Musculoskeletal: Negative for arthralgias.   Skin: Positive for rash.       Objective   Physical Exam   Constitutional: She is oriented to person, place, and time. She appears well-developed and well-nourished.   HENT:   Head: Normocephalic and atraumatic.   Right Ear: Hearing, external ear and ear canal normal. Tympanic membrane is not injected, not erythematous and not retracted. A middle ear effusion is present.   Left Ear: Hearing, external ear and ear canal normal. Tympanic membrane is not injected, not erythematous and not retracted. A middle ear effusion is present.   Nose: Nose normal. No rhinorrhea or congestion.   Mouth/Throat:  Uvula is midline, oropharynx is clear and moist and mucous membranes are normal.   Eyes: Conjunctivae are normal.   Neck: Neck supple.   Cardiovascular: Normal rate, regular rhythm and normal heart sounds.   Pulmonary/Chest: Effort normal and breath sounds normal.   Musculoskeletal: She exhibits no edema or deformity.        Arms:       Hands:  Bilateral wrists: soft cysts present  Right hand 4th digit: bullous present, clear fluid filled    Lymphadenopathy:     She has no cervical adenopathy.   Neurological: She is alert and oriented to person, place, and time.   Skin: Skin is warm and dry.   Psychiatric: She has a normal mood and affect. Her behavior is normal.   Nursing note and vitals reviewed.        Assessment/Plan   Taina was seen today for cyst, earache and rash.    Diagnoses and all orders for this visit:    Skin candidiasis  -     fluconazole (DIFLUCAN) 150 MG tablet; Take 1 tablet by mouth 1 (One) Time for 1 dose.  -     nystatin (MYCOSTATIN) 678817 UNIT/GM powder; Apply  topically to the appropriate area as directed 3 (Three) Times a Day.    Dysfunction of both eustachian tubes  -     fluticasone (FLONASE) 50 MCG/ACT nasal spray; 2 sprays into the nostril(s) as directed by provider Daily.    Ganglion of wrist, unspecified laterality      Reassured her of ganglion cysts, not causing any symptoms at this time.   Flonase to improve ETD. No sign of bacteria infection. Continue OTC antihistamine.   Right fourth digit of hand appears to be a blister.  Advised her to apply cold compresses to the area, okay to take over-the-counter NSAID as directed temporarily for edema and pain relief.  She will notify me if this does not resolve.

## 2019-06-13 NOTE — PATIENT INSTRUCTIONS
Go to the nearest ER or return to clinic if symptoms worsen, fever/chill develop    Skin Yeast Infection  Skin yeast infection is a condition in which there is an overgrowth of yeast (candida) that normally lives on the skin. This condition usually occurs in areas of the skin that are constantly warm and moist, such as the armpits or the groin.  What are the causes?  This condition is caused by a change in the normal balance of the yeast and bacteria that live on the skin.  What increases the risk?  This condition is more likely to develop in:  · People who are obese.  · Pregnant women.  · Women who take birth control pills.  · People who have diabetes.  · People who take antibiotic medicines.  · People who take steroid medicines.  · People who are malnourished.  · People who have a weak defense (immune) system.  · People who are 65 years of age or older.    What are the signs or symptoms?  Symptoms of this condition include:  · A red, swollen area of the skin.  · Bumps on the skin.  · Itchiness.    How is this diagnosed?  This condition is diagnosed with a medical history and physical exam. Your health care provider may check for yeast by taking light scrapings of the skin to be viewed under a microscope.  How is this treated?  This condition is treated with medicine. Medicines may be prescribed or be available over-the-counter. The medicines may be:  · Taken by mouth (orally).  · Applied as a cream.    Follow these instructions at home:  · Take or apply over-the-counter and prescription medicines only as told by your health care provider.  · Eat more yogurt. This may help to keep your yeast infection from returning.  · Maintain a healthy weight. If you need help losing weight, talk with your health care provider.  · Keep your skin clean and dry.  · If you have diabetes, keep your blood sugar under control.  Contact a health care provider if:  · Your symptoms go away and then return.  · Your symptoms do not get  better with treatment.  · Your symptoms get worse.  · Your rash spreads.  · You have a fever or chills.  · You have new symptoms.  · You have new warmth or redness of your skin.  This information is not intended to replace advice given to you by your health care provider. Make sure you discuss any questions you have with your health care provider.  Document Released: 09/04/2012 Document Revised: 08/13/2017 Document Reviewed: 06/20/2016  [employer] Interactive Patient Education © 2019 [employer]

## 2019-07-01 ENCOUNTER — OFFICE VISIT (OUTPATIENT)
Dept: FAMILY MEDICINE CLINIC | Facility: CLINIC | Age: 69
End: 2019-07-01

## 2019-07-01 VITALS
BODY MASS INDEX: 29.69 KG/M2 | HEIGHT: 65 IN | RESPIRATION RATE: 18 BRPM | SYSTOLIC BLOOD PRESSURE: 138 MMHG | DIASTOLIC BLOOD PRESSURE: 88 MMHG | HEART RATE: 72 BPM | TEMPERATURE: 97.4 F | WEIGHT: 178.2 LBS

## 2019-07-01 DIAGNOSIS — E78.2 MIXED HYPERLIPIDEMIA: ICD-10-CM

## 2019-07-01 DIAGNOSIS — R74.8 ABNORMAL LIVER ENZYMES: ICD-10-CM

## 2019-07-01 DIAGNOSIS — G47.9 SLEEP DISTURBANCES: ICD-10-CM

## 2019-07-01 DIAGNOSIS — I10 ESSENTIAL HYPERTENSION: ICD-10-CM

## 2019-07-01 DIAGNOSIS — R80.9 TYPE 2 DIABETES MELLITUS WITH MICROALBUMINURIA, WITHOUT LONG-TERM CURRENT USE OF INSULIN (HCC): Primary | ICD-10-CM

## 2019-07-01 DIAGNOSIS — E11.29 TYPE 2 DIABETES MELLITUS WITH MICROALBUMINURIA, WITHOUT LONG-TERM CURRENT USE OF INSULIN (HCC): Primary | ICD-10-CM

## 2019-07-01 DIAGNOSIS — E53.8 COBALAMIN DEFICIENCY: ICD-10-CM

## 2019-07-01 DIAGNOSIS — M71.30 SYNOVIAL CYST: ICD-10-CM

## 2019-07-01 PROCEDURE — 99214 OFFICE O/P EST MOD 30 MIN: CPT | Performed by: FAMILY MEDICINE

## 2019-07-01 RX ORDER — FLUCONAZOLE 150 MG/1
150 TABLET ORAL ONCE
Qty: 1 TABLET | Refills: 0 | Status: SHIPPED | OUTPATIENT
Start: 2019-07-01 | End: 2019-07-01

## 2019-07-11 LAB
ALBUMIN SERPL-MCNC: 4.6 G/DL (ref 3.5–5.2)
ALBUMIN/GLOB SERPL: 1.7 G/DL
ALP SERPL-CCNC: 85 U/L (ref 39–117)
ALT SERPL-CCNC: 35 U/L (ref 1–33)
AST SERPL-CCNC: 40 U/L (ref 1–32)
BILIRUB SERPL-MCNC: 1.1 MG/DL (ref 0.2–1.2)
BUN SERPL-MCNC: 10 MG/DL (ref 8–23)
BUN/CREAT SERPL: 13.3 (ref 7–25)
CALCIUM SERPL-MCNC: 9.6 MG/DL (ref 8.6–10.5)
CHLORIDE SERPL-SCNC: 101 MMOL/L (ref 98–107)
CO2 SERPL-SCNC: 25.4 MMOL/L (ref 22–29)
CREAT SERPL-MCNC: 0.75 MG/DL (ref 0.57–1)
GLOBULIN SER CALC-MCNC: 2.7 GM/DL
GLUCOSE SERPL-MCNC: 144 MG/DL (ref 65–99)
HBA1C MFR BLD: 7.2 % (ref 4.8–5.6)
POTASSIUM SERPL-SCNC: 4 MMOL/L (ref 3.5–5.2)
PROT SERPL-MCNC: 7.3 G/DL (ref 6–8.5)
SODIUM SERPL-SCNC: 141 MMOL/L (ref 136–145)
VIT B12 SERPL-MCNC: 293 PG/ML (ref 211–946)

## 2019-07-12 ENCOUNTER — TELEPHONE (OUTPATIENT)
Dept: FAMILY MEDICINE CLINIC | Facility: CLINIC | Age: 69
End: 2019-07-12

## 2019-07-12 DIAGNOSIS — E53.8 COBALAMIN DEFICIENCY: ICD-10-CM

## 2019-07-12 DIAGNOSIS — E11.29 TYPE 2 DIABETES MELLITUS WITH MICROALBUMINURIA, WITHOUT LONG-TERM CURRENT USE OF INSULIN (HCC): Primary | ICD-10-CM

## 2019-07-12 DIAGNOSIS — E78.2 MIXED HYPERLIPIDEMIA: ICD-10-CM

## 2019-07-12 DIAGNOSIS — R80.9 TYPE 2 DIABETES MELLITUS WITH MICROALBUMINURIA, WITHOUT LONG-TERM CURRENT USE OF INSULIN (HCC): Primary | ICD-10-CM

## 2019-07-12 NOTE — TELEPHONE ENCOUNTER
Order for nutritionist entered.  The 1 mg p.o. daily of B12 is an over-the-counter dose.  Have encouraged her to start and stay on.  We will recheck at her next visit.  I would like to see her back in 4 months.  Nothing currently scheduled.  30-minute follow-up at that time-thank you

## 2019-07-12 NOTE — TELEPHONE ENCOUNTER
----- Message from Renée Juan MA sent at 7/12/2019 12:53 PM EDT -----  Patient informed, verbalized good understanding. States she is willing to see to see nutritionist. States she has not taken b12.

## 2019-07-14 DIAGNOSIS — K21.9 GASTROESOPHAGEAL REFLUX DISEASE WITHOUT ESOPHAGITIS: ICD-10-CM

## 2019-07-15 RX ORDER — ESTRADIOL 1 MG/1
TABLET ORAL
Qty: 90 TABLET | Refills: 0 | Status: SHIPPED | OUTPATIENT
Start: 2019-07-15 | End: 2019-12-12 | Stop reason: SDUPTHER

## 2019-07-15 RX ORDER — RANITIDINE 150 MG/1
CAPSULE ORAL
Qty: 60 CAPSULE | Refills: 11 | Status: SHIPPED | OUTPATIENT
Start: 2019-07-15 | End: 2020-02-17

## 2019-07-25 DIAGNOSIS — I10 ESSENTIAL HYPERTENSION: ICD-10-CM

## 2019-07-26 RX ORDER — DILTIAZEM HYDROCHLORIDE 120 MG/1
TABLET, FILM COATED ORAL
Qty: 360 TABLET | Refills: 1 | Status: SHIPPED | OUTPATIENT
Start: 2019-07-26 | End: 2020-11-30

## 2019-07-26 RX ORDER — GLIMEPIRIDE 1 MG/1
1 TABLET ORAL 2 TIMES DAILY
Qty: 180 TABLET | Refills: 3 | Status: SHIPPED | OUTPATIENT
Start: 2019-07-26 | End: 2019-12-12 | Stop reason: SDUPTHER

## 2019-07-26 RX ORDER — TORSEMIDE 5 MG/1
TABLET ORAL
Qty: 30 TABLET | Refills: 3 | Status: SHIPPED | OUTPATIENT
Start: 2019-07-26 | End: 2021-02-01 | Stop reason: SDUPTHER

## 2019-08-02 ENCOUNTER — OFFICE VISIT (OUTPATIENT)
Dept: FAMILY MEDICINE CLINIC | Facility: CLINIC | Age: 69
End: 2019-08-02

## 2019-08-02 VITALS
TEMPERATURE: 97.2 F | BODY MASS INDEX: 29.12 KG/M2 | DIASTOLIC BLOOD PRESSURE: 82 MMHG | WEIGHT: 175 LBS | HEART RATE: 68 BPM | RESPIRATION RATE: 16 BRPM | SYSTOLIC BLOOD PRESSURE: 138 MMHG

## 2019-08-02 DIAGNOSIS — E53.8 COBALAMIN DEFICIENCY: Primary | ICD-10-CM

## 2019-08-02 DIAGNOSIS — E55.9 VITAMIN D DEFICIENCY: ICD-10-CM

## 2019-08-02 DIAGNOSIS — R53.83 OTHER FATIGUE: ICD-10-CM

## 2019-08-02 PROCEDURE — 99213 OFFICE O/P EST LOW 20 MIN: CPT | Performed by: FAMILY MEDICINE

## 2019-08-02 NOTE — PATIENT INSTRUCTIONS
Go to the nearest ER or return to clinic if symptoms worsen, fever/chill develop    Fatigue  If you have fatigue, you feel tired all the time and have a lack of energy or a lack of motivation. Fatigue may make it difficult to start or complete tasks because of exhaustion. In general, occasional or mild fatigue is often a normal response to activity or life. However, long-lasting (chronic) or extreme fatigue may be a symptom of a medical condition.  Follow these instructions at home:  General instructions  · Watch your fatigue for any changes.  · Go to bed and get up at the same time every day.  · Avoid fatigue by pacing yourself during the day and getting enough sleep at night.  · Maintain a healthy weight.  Medicines  · Take over-the-counter and prescription medicines only as told by your health care provider.  · Take a multivitamin, if told by your health care provider.   · Do not use herbal or dietary supplements unless they are approved by your health care provider.  Activity    · Exercise regularly, as told by your health care provider.  · Use or practice techniques to help you relax, such as yoga, lore chi, meditation, or massage therapy.  Eating and drinking    · Avoid heavy meals in the evening.  · Eat a well-balanced diet, which includes lean proteins, whole grains, plenty of fruits and vegetables, and low-fat dairy products.  · Avoid consuming too much caffeine.  · Avoid the use of alcohol.  · Drink enough fluid to keep your urine pale yellow.  Lifestyle  · Change situations that cause you stress. Try to keep your work and personal schedule in balance.  · Do not use any products that contain nicotine or tobacco, such as cigarettes and e-cigarettes. If you need help quitting, ask your health care provider.  · Do not use drugs.  Contact a health care provider if:  · Your fatigue does not get better.  · You have a fever.  · You suddenly lose or gain weight.  · You have headaches.  · You have trouble falling  asleep or sleeping through the night.  · You feel angry, guilty, anxious, or sad.  · You are unable to have a bowel movement (constipation).  · Your skin is dry.  · You have swelling in your legs or another part of your body.  Get help right away if:  · You feel confused.  · Your vision is blurry.  · You feel faint or you pass out.  · You have a severe headache.  · You have severe pain in your abdomen, your back, or the area between your waist and hips (pelvis).  · You have chest pain, shortness of breath, or an irregular or fast heartbeat.  · You are unable to urinate, or you urinate less than normal.  · You have abnormal bleeding, such as bleeding from the rectum, vagina, nose, lungs, or nipples.  · You vomit blood.  · You have thoughts about hurting yourself or others.  If you ever feel like you may hurt yourself or others, or have thoughts about taking your own life, get help right away. You can go to your nearest emergency department or call:  · Your local emergency services (911 in the [place]  · A suicide crisis helpline, such as the National Suicide Prevention Lifeline at 1-690.201.9216. This is open 24 hours a day.  Summary  · If you have fatigue, you feel tired all the time and have a lack of energy or a lack of motivation.  · Fatigue may make it difficult to start or complete tasks because of exhaustion.  · Long-lasting (chronic) or extreme fatigue may be a symptom of a medical condition.  · Exercise regularly, as told by your health care provider.  · Change situations that cause you stress. Try to keep your work and personal schedule in balance.  This information is not intended to replace advice given to you by your health care provider. Make sure you discuss any questions you have with your health care provider.  Document Released: 10/14/2008 Document Revised: 09/12/2018 Document Reviewed: 09/12/2018  [employer] Interactive Patient Education © 2019 [employer]

## 2019-08-02 NOTE — PROGRESS NOTES
Subjective   Taina Moreno is a 69 y.o. female.   Chief Complaint   Patient presents with   • Follow-up     on B12, fatigue     History of Present Illness   She saw Dr. Nation in July 2019, labs updated then. She was advised to take vitamin b12 because a low normal. She has started replacement.   Also, referred to nutritionist to help with dietary changes due to worsening diabetes.   Overall, feeling fatigued and imbalance. No falls, light headedness, dizziness.   History of low vitamin d, not taking replacement at this time. Also, she used to take medication for hypothyroidism, but no longer treated.     The following portions of the patient's history were reviewed and updated as appropriate: allergies, current medications, past family history, past medical history, past social history, past surgical history and problem list.    Review of Systems   Constitutional: Positive for fatigue. Negative for fever.   Respiratory: Negative for chest tightness.    Cardiovascular: Negative for chest pain and palpitations.   Musculoskeletal: Negative for arthralgias and gait problem.   Neurological: Negative for dizziness, syncope and light-headedness.       Objective   Physical Exam   Constitutional: She is oriented to person, place, and time. She appears well-developed and well-nourished. No distress.   HENT:   Head: Normocephalic and atraumatic.   Right Ear: Hearing, tympanic membrane, external ear and ear canal normal.   Left Ear: Hearing, tympanic membrane, external ear and ear canal normal.   Nose: Nose normal.   Eyes: Conjunctivae are normal.   Cardiovascular: Normal rate, regular rhythm and normal heart sounds.   No murmur heard.  Pulmonary/Chest: Effort normal and breath sounds normal.   Musculoskeletal: She exhibits no edema.   Neurological: She is alert and oriented to person, place, and time. Gait normal.   Skin: Skin is warm and dry.   Psychiatric: Her behavior is normal.   Nursing note and vitals  reviewed.        Assessment/Plan   Taina was seen today for follow-up.    Diagnoses and all orders for this visit:    Cobalamin deficiency  -     Vitamin B12  -     TSH Rfx On Abnormal To Free T4  -     Vitamin D 25 Hydroxy    Other fatigue  -     Vitamin B12  -     TSH Rfx On Abnormal To Free T4  -     Vitamin D 25 Hydroxy    Vitamin D deficiency   -     Vitamin D 25 Hydroxy      Labs to update current symptoms.   Again, importance of healthy diet stressed to her for overall health and improvement of diabetes.

## 2019-08-03 LAB
25(OH)D3+25(OH)D2 SERPL-MCNC: 32.6 NG/ML (ref 30–100)
TSH SERPL DL<=0.005 MIU/L-ACNC: 0.79 MIU/ML (ref 0.27–4.2)
VIT B12 SERPL-MCNC: 1041 PG/ML (ref 211–946)

## 2019-09-18 DIAGNOSIS — I10 ESSENTIAL HYPERTENSION: ICD-10-CM

## 2019-09-18 RX ORDER — IRBESARTAN 150 MG/1
TABLET ORAL
Qty: 90 TABLET | Refills: 0 | Status: SHIPPED | OUTPATIENT
Start: 2019-09-18 | End: 2019-12-12 | Stop reason: SDUPTHER

## 2019-10-23 RX ORDER — ESTRADIOL 1 MG/1
TABLET ORAL
Qty: 90 TABLET | Refills: 0 | OUTPATIENT
Start: 2019-10-23

## 2019-12-12 ENCOUNTER — OFFICE VISIT (OUTPATIENT)
Dept: FAMILY MEDICINE CLINIC | Facility: CLINIC | Age: 69
End: 2019-12-12

## 2019-12-12 VITALS
DIASTOLIC BLOOD PRESSURE: 78 MMHG | TEMPERATURE: 97.8 F | WEIGHT: 174 LBS | HEART RATE: 78 BPM | RESPIRATION RATE: 18 BRPM | OXYGEN SATURATION: 98 % | BODY MASS INDEX: 28.99 KG/M2 | SYSTOLIC BLOOD PRESSURE: 132 MMHG | HEIGHT: 65 IN

## 2019-12-12 DIAGNOSIS — J06.9 ACUTE URI: Primary | ICD-10-CM

## 2019-12-12 DIAGNOSIS — M54.6 ACUTE MIDLINE THORACIC BACK PAIN: ICD-10-CM

## 2019-12-12 DIAGNOSIS — I10 ESSENTIAL HYPERTENSION: ICD-10-CM

## 2019-12-12 DIAGNOSIS — R30.0 DYSURIA: ICD-10-CM

## 2019-12-12 LAB
BILIRUB BLD-MCNC: ABNORMAL MG/DL
CLARITY, POC: ABNORMAL
COLOR UR: ABNORMAL
EXPIRATION DATE: ABNORMAL
GLUCOSE UR STRIP-MCNC: NEGATIVE MG/DL
KETONES UR QL: ABNORMAL
LEUKOCYTE EST, POC: ABNORMAL
Lab: ABNORMAL
NITRITE UR-MCNC: NEGATIVE MG/ML
PH UR: 6 [PH] (ref 5–8)
PROT UR STRIP-MCNC: ABNORMAL MG/DL
RBC # UR STRIP: NEGATIVE /UL
SP GR UR: 1.02 (ref 1–1.03)
UROBILINOGEN UR QL: NORMAL

## 2019-12-12 PROCEDURE — 99213 OFFICE O/P EST LOW 20 MIN: CPT | Performed by: FAMILY MEDICINE

## 2019-12-12 PROCEDURE — 81003 URINALYSIS AUTO W/O SCOPE: CPT | Performed by: FAMILY MEDICINE

## 2019-12-12 RX ORDER — ESTRADIOL 1 MG/1
1 TABLET ORAL DAILY
Qty: 90 TABLET | Refills: 1 | Status: SHIPPED | OUTPATIENT
Start: 2019-12-12 | End: 2020-06-11

## 2019-12-12 RX ORDER — TIZANIDINE 4 MG/1
TABLET ORAL
Qty: 30 TABLET | Refills: 1 | Status: SHIPPED | OUTPATIENT
Start: 2019-12-12 | End: 2020-06-03 | Stop reason: SDUPTHER

## 2019-12-12 RX ORDER — CEPHALEXIN 500 MG/1
500 CAPSULE ORAL 2 TIMES DAILY
Qty: 14 CAPSULE | Refills: 0 | Status: SHIPPED | OUTPATIENT
Start: 2019-12-12 | End: 2019-12-19

## 2019-12-12 RX ORDER — IRBESARTAN 150 MG/1
TABLET ORAL
Qty: 90 TABLET | Refills: 1 | Status: SHIPPED | OUTPATIENT
Start: 2019-12-12 | End: 2020-06-11

## 2019-12-12 RX ORDER — GLIMEPIRIDE 1 MG/1
1 TABLET ORAL 2 TIMES DAILY
Qty: 180 TABLET | Refills: 1 | Status: SHIPPED | OUTPATIENT
Start: 2019-12-12 | End: 2020-09-07

## 2019-12-12 NOTE — PATIENT INSTRUCTIONS
Go to the nearest ER or return to clinic if symptoms worsen, fever/chill develop    Sinusitis, Adult  Sinusitis is soreness and swelling (inflammation) of your sinuses. Sinuses are hollow spaces in the bones around your face. They are located:  · Around your eyes.  · In the middle of your forehead.  · Behind your nose.  · In your cheekbones.  Your sinuses and nasal passages are lined with a fluid called mucus. Mucus drains out of your sinuses. Swelling can trap mucus in your sinuses. This lets germs (bacteria, virus, or fungus) grow, which leads to infection. Most of the time, this condition is caused by a virus.  What are the causes?  This condition is caused by:  · Allergies.  · Asthma.  · Germs.  · Things that block your nose or sinuses.  · Growths in the nose (nasal polyps).  · Chemicals or irritants in the air.  · Fungus (rare).  What increases the risk?  You are more likely to develop this condition if:  · You have a weak body defense system (immune system).  · You do a lot of swimming or diving.  · You use nasal sprays too much.  · You smoke.  What are the signs or symptoms?  The main symptoms of this condition are pain and a feeling of pressure around the sinuses. Other symptoms include:  · Stuffy nose (congestion).  · Runny nose (drainage).  · Swelling and warmth in the sinuses.  · Headache.  · Toothache.  · A cough that may get worse at night.  · Mucus that collects in the throat or the back of the nose (postnasal drip).  · Being unable to smell and taste.  · Being very tired (fatigue).  · A fever.  · Sore throat.  · Bad breath.  How is this diagnosed?  This condition is diagnosed based on:  · Your symptoms.  · Your medical history.  · A physical exam.  · Tests to find out if your condition is short-term (acute) or long-term (chronic). Your doctor may:  ? Check your nose for growths (polyps).  ? Check your sinuses using a tool that has a light (endoscope).  ? Check for allergies or germs.  ? Do imaging  tests, such as an MRI or CT scan.  How is this treated?  Treatment for this condition depends on the cause and whether it is short-term or long-term.  · If caused by a virus, your symptoms should go away on their own within 10 days. You may be given medicines to relieve symptoms. They include:  ? Medicines that shrink swollen tissue in the nose.  ? Medicines that treat allergies (antihistamines).  ? A spray that treats swelling of the nostrils.   ? Rinses that help get rid of thick mucus in your nose (nasal saline washes).  · If caused by bacteria, your doctor may wait to see if you will get better without treatment. You may be given antibiotic medicine if you have:  ? A very bad infection.  ? A weak body defense system.  · If caused by growths in the nose, you may need to have surgery.  Follow these instructions at home:  Medicines  · Take, use, or apply over-the-counter and prescription medicines only as told by your doctor. These may include nasal sprays.  · If you were prescribed an antibiotic medicine, take it as told by your doctor. Do not stop taking the antibiotic even if you start to feel better.  Hydrate and humidify    · Drink enough water to keep your pee (urine) pale yellow.  · Use a cool mist humidifier to keep the humidity level in your home above 50%.  · Breathe in steam for 10-15 minutes, 3-4 times a day, or as told by your doctor. You can do this in the bathroom while a hot shower is running.  · Try not to spend time in cool or dry air.  Rest  · Rest as much as you can.  · Sleep with your head raised (elevated).  · Make sure you get enough sleep each night.  General instructions    · Put a warm, moist washcloth on your face 3-4 times a day, or as often as told by your doctor. This will help with discomfort.  · Wash your hands often with soap and water. If there is no soap and water, use hand .  · Do not smoke. Avoid being around people who are smoking (secondhand smoke).  · Keep all  follow-up visits as told by your doctor. This is important.  Contact a doctor if:  · You have a fever.  · Your symptoms get worse.  · Your symptoms do not get better within 10 days.  Get help right away if:  · You have a very bad headache.  · You cannot stop throwing up (vomiting).  · You have very bad pain or swelling around your face or eyes.  · You have trouble seeing.  · You feel confused.  · Your neck is stiff.  · You have trouble breathing.  Summary  · Sinusitis is swelling of your sinuses. Sinuses are hollow spaces in the bones around your face.  · This condition is caused by tissues in your nose that become inflamed or swollen. This traps germs. These can lead to infection.  · If you were prescribed an antibiotic medicine, take it as told by your doctor. Do not stop taking it even if you start to feel better.  · Keep all follow-up visits as told by your doctor. This is important.  This information is not intended to replace advice given to you by your health care provider. Make sure you discuss any questions you have with your health care provider.  Document Released: 06/05/2009 Document Revised: 05/20/2019 Document Reviewed: 05/20/2019  [employer] Interactive Patient Education © 2019 [employer]

## 2019-12-12 NOTE — PROGRESS NOTES
Subjective   Taina Moreno is a 69 y.o. female.   Chief Complaint   Patient presents with   • Sinus Problem   • Nausea   • Difficulty Urinating     flank pain and frequency x2weeks     Sinusitis   This is a new problem. The current episode started 1 to 4 weeks ago (2 weeks). The problem has been gradually worsening since onset. There has been no fever. Associated symptoms include congestion, ear pain, a hoarse voice and sinus pressure. Pertinent negatives include no coughing or sore throat. Treatments tried: OTC cough syrup, antihistamine. The treatment provided mild relief.   Urinary Tract Infection    This is a new problem. The current episode started 1 to 4 weeks ago (2 weeks). The quality of the pain is described as aching. There has been no fever. Associated symptoms include flank pain, frequency and nausea. Pertinent negatives include no sweats, urgency or vomiting. She has tried nothing for the symptoms. The treatment provided no relief.      She is requesting refill of some medications.    The following portions of the patient's history were reviewed and updated as appropriate: allergies, current medications, past family history, past medical history, past social history, past surgical history and problem list.    Review of Systems   HENT: Positive for congestion, ear pain, hoarse voice and sinus pressure. Negative for sore throat.    Respiratory: Negative for cough.    Cardiovascular: Negative for chest pain.   Gastrointestinal: Positive for nausea. Negative for vomiting.   Genitourinary: Positive for flank pain and frequency. Negative for urgency.   Musculoskeletal: Positive for back pain.       Objective   Physical Exam   Constitutional: She appears well-developed and well-nourished.   HENT:   Head: Normocephalic and atraumatic.   Right Ear: Hearing, tympanic membrane, external ear and ear canal normal.   Left Ear: Hearing, tympanic membrane, external ear and ear canal normal.   Nose: Congestion  present.   Eyes: Conjunctivae are normal.   Neck: Neck supple.   Cardiovascular: Normal rate, regular rhythm and normal heart sounds.   Pulmonary/Chest: Effort normal and breath sounds normal. She has no wheezes.   Abdominal: There is tenderness in the suprapubic area. There is no rigidity, no guarding and no CVA tenderness.   Neurological: She is alert.   Skin: Skin is warm.   Psychiatric: Her behavior is normal.   Nursing note and vitals reviewed.        Assessment/Plan   Problems Addressed this Visit        Cardiovascular and Mediastinum    Essential hypertension    Relevant Medications    irbesartan (AVAPRO) 150 MG tablet      Other Visit Diagnoses     Acute URI    -  Primary    Relevant Medications    cephalexin (KEFLEX) 500 MG capsule    Dysuria        Relevant Orders    POCT urinalysis dipstick, automated (Completed)    Urine Culture - Urine, Urine, Clean Catch    Acute midline thoracic back pain        Relevant Medications    tiZANidine (ZANAFLEX) 4 MG tablet        Keflex to improve URI.  UA unremarkable, will culture.   Medications refilled as courtesy to patient.  Recommend that she schedule a routine follow-up with her PCP in the near future.

## 2019-12-14 LAB
BACTERIA UR CULT: ABNORMAL
BACTERIA UR CULT: ABNORMAL

## 2019-12-17 ENCOUNTER — TELEPHONE (OUTPATIENT)
Dept: FAMILY MEDICINE CLINIC | Facility: CLINIC | Age: 69
End: 2019-12-17

## 2019-12-17 RX ORDER — FLUCONAZOLE 150 MG/1
150 TABLET ORAL ONCE
Qty: 1 TABLET | Refills: 0 | Status: SHIPPED | OUTPATIENT
Start: 2019-12-17 | End: 2019-12-17

## 2019-12-17 NOTE — TELEPHONE ENCOUNTER
CHERI;PT CALLED    PT HAS A YEAST INFECTION FROM TAKING THE ANTIBIOTIC GIVEN TO HER  BY DR ALONZO-SHE HAS ITCHING BURNING WITH DEEP RED BURNING RASH SINCE YESTERDAY.  MAY SHE  HAVE SOMETHING CALLED INTO  [facility]    [phone]

## 2020-01-02 ENCOUNTER — TELEPHONE (OUTPATIENT)
Dept: FAMILY MEDICINE CLINIC | Facility: CLINIC | Age: 70
End: 2020-01-02

## 2020-01-02 NOTE — TELEPHONE ENCOUNTER
Patient called requesting to schedule an appointment with either Dr. Nation or Dr. Castaneda for tomorrow (1/3/20) morning, if possible. The patient stated that she is experiencing pain that started in her neck and has moved all the way down to her lower back, she is having shortness of breath, and she is very nauseated. The patient stated that she just does not feel very well, and she believes she may be passing a kidney stone or she may have pneumonia. I offered to get a clinical staff member on the phone to speak with her, but she said that she was okay and she would go the ER if she felt that she needed to. I scheduled the patient for Dr. Nation's first available appointment tomorrow (1/3/20) morning at 10:00 AM. I just wanted to make clinical staff aware of the symptoms that the patient said that she is experiencing. If there are any questions or concerns please call the patient back at [phone].

## 2020-01-03 ENCOUNTER — OFFICE VISIT (OUTPATIENT)
Dept: FAMILY MEDICINE CLINIC | Facility: CLINIC | Age: 70
End: 2020-01-03

## 2020-01-03 VITALS
HEIGHT: 65 IN | BODY MASS INDEX: 29.49 KG/M2 | SYSTOLIC BLOOD PRESSURE: 130 MMHG | HEART RATE: 80 BPM | TEMPERATURE: 98.7 F | RESPIRATION RATE: 16 BRPM | WEIGHT: 177 LBS | DIASTOLIC BLOOD PRESSURE: 70 MMHG

## 2020-01-03 DIAGNOSIS — R10.9 FLANK PAIN: ICD-10-CM

## 2020-01-03 DIAGNOSIS — R11.0 NAUSEA: ICD-10-CM

## 2020-01-03 DIAGNOSIS — J01.00 ACUTE NON-RECURRENT MAXILLARY SINUSITIS: Primary | ICD-10-CM

## 2020-01-03 DIAGNOSIS — B37.31 YEAST VAGINITIS: ICD-10-CM

## 2020-01-03 LAB
BILIRUB BLD-MCNC: NEGATIVE MG/DL
CLARITY, POC: CLEAR
COLOR UR: YELLOW
GLUCOSE UR STRIP-MCNC: NEGATIVE MG/DL
KETONES UR QL: NEGATIVE
LEUKOCYTE EST, POC: NEGATIVE
NITRITE UR-MCNC: NEGATIVE MG/ML
PH UR: 6 [PH] (ref 5–8)
PROT UR STRIP-MCNC: ABNORMAL MG/DL
RBC # UR STRIP: NEGATIVE /UL
SP GR UR: 1.02 (ref 1–1.03)
UROBILINOGEN UR QL: NORMAL

## 2020-01-03 PROCEDURE — 99213 OFFICE O/P EST LOW 20 MIN: CPT | Performed by: FAMILY MEDICINE

## 2020-01-03 PROCEDURE — 81003 URINALYSIS AUTO W/O SCOPE: CPT | Performed by: FAMILY MEDICINE

## 2020-01-03 RX ORDER — METHYLPREDNISOLONE 4 MG/1
TABLET ORAL
Qty: 1 TABLET | Refills: 0 | Status: SHIPPED | OUTPATIENT
Start: 2020-01-03 | End: 2020-02-17

## 2020-01-03 RX ORDER — FLUCONAZOLE 150 MG/1
150 TABLET ORAL ONCE
Qty: 1 TABLET | Refills: 1 | Status: SHIPPED | OUTPATIENT
Start: 2020-01-03 | End: 2020-01-03

## 2020-01-03 RX ORDER — AZITHROMYCIN 250 MG/1
TABLET, FILM COATED ORAL
Qty: 6 TABLET | Refills: 0 | Status: SHIPPED | OUTPATIENT
Start: 2020-01-03 | End: 2020-02-17

## 2020-01-03 NOTE — PROGRESS NOTES
Subjective   Taina Moreno is a 69 y.o. female.   Chief Complaint   Patient presents with   • Back Pain     started two days ago    • Shortness of Breath     notices when back pain starts    • Nausea     thinks related to PND   • sinus congestion, pain & drainage     started two days ago      History of Present Illness     Patient comes in today complaining of neck pain that now radiates down her back.  She also describes nausea associated with it.  This began 2-3 days ago.    She also feels short of breath.  When she called to make the appointment yesterday she told our staff that she thought she was perhaps getting a kidney stone or pneumonia.  She was advised if she was feeling that poorly to proceed to the emergency department.  She thought she could wait until today's appointment.    U/A today in the office does not show any blood.    PND, ears congested. Subjective fevers.  Some dry cough noted. Rhinorrhea noted.  Myalgias, ST    Not resting well overall even before sick.    The following portions of the patient's history were reviewed and updated as appropriate: allergies, current medications, past medical history, past social history and problem list.    Review of Systems   Constitutional: Positive for fever.   HENT: Positive for congestion, postnasal drip and sore throat.    Eyes: Negative.    Respiratory: Positive for cough and shortness of breath. Negative for wheezing.    Cardiovascular: Negative.    Gastrointestinal: Positive for nausea.   Genitourinary: Positive for flank pain.   Neurological: Negative.  Negative for dizziness and headache.   Psychiatric/Behavioral: Negative.        Objective   Physical Exam   Constitutional: She appears well-developed. No distress.   HENT:   Right Ear: External ear normal.   Left Ear: External ear normal.   Mouth/Throat: Oropharyngeal exudate present.   PND - TMs retracted with A/F level noted bilaterally   Eyes: Pupils are equal, round, and reactive to light.  Conjunctivae are normal.   Neck: Neck supple.   Cardiovascular: Normal rate and regular rhythm.   Pulmonary/Chest: Effort normal and breath sounds normal. She has no wheezes. She has no rales.   Lymphadenopathy:     She has no cervical adenopathy.   Psychiatric: Her behavior is normal. Thought content normal.   Nursing note and vitals reviewed.        Assessment/Plan   Taina was seen today for back pain, shortness of breath, nausea and sinus congestion, pain & drainage.    Diagnoses and all orders for this visit:    Acute non-recurrent maxillary sinusitis  Comments:  Tx as noted  Orders:  -     azithromycin (ZITHROMAX Z-RAJENDRA) 250 MG tablet; Take 2 tablets the first day, then 1 tablet daily for 4 days.  -     methylPREDNISolone (MEDROL, RAJENDRA,) 4 MG tablet; Take as directed on package instructions.    Nausea    Yeast vaginitis  Comments:  Diflucan in 5 days on Abx and can repeat x 1, if needed  Orders:  -     fluconazole (DIFLUCAN) 150 MG tablet; Take 1 tablet by mouth 1 (One) Time for 1 dose.    Flank pain  Comments:  Doubt kidney stone - no blood in urine. I think due to cough and infection related myalgias.             Michael Nation MD  01/03/2020

## 2020-02-17 ENCOUNTER — TELEPHONE (OUTPATIENT)
Dept: FAMILY MEDICINE CLINIC | Facility: CLINIC | Age: 70
End: 2020-02-17

## 2020-02-17 ENCOUNTER — OFFICE VISIT (OUTPATIENT)
Dept: FAMILY MEDICINE CLINIC | Facility: CLINIC | Age: 70
End: 2020-02-17

## 2020-02-17 VITALS
BODY MASS INDEX: 29.82 KG/M2 | RESPIRATION RATE: 16 BRPM | TEMPERATURE: 97.6 F | OXYGEN SATURATION: 98 % | HEART RATE: 78 BPM | DIASTOLIC BLOOD PRESSURE: 72 MMHG | WEIGHT: 179 LBS | SYSTOLIC BLOOD PRESSURE: 124 MMHG | HEIGHT: 65 IN

## 2020-02-17 DIAGNOSIS — R60.0 LEG EDEMA, RIGHT: Primary | ICD-10-CM

## 2020-02-17 DIAGNOSIS — J01.00 ACUTE NON-RECURRENT MAXILLARY SINUSITIS: ICD-10-CM

## 2020-02-17 PROCEDURE — 99214 OFFICE O/P EST MOD 30 MIN: CPT | Performed by: FAMILY MEDICINE

## 2020-02-17 RX ORDER — FLUCONAZOLE 150 MG/1
150 TABLET ORAL
Qty: 2 TABLET | Refills: 0 | Status: SHIPPED | OUTPATIENT
Start: 2020-02-17 | End: 2020-06-02

## 2020-02-17 RX ORDER — CEPHALEXIN 500 MG/1
500 CAPSULE ORAL 2 TIMES DAILY
Qty: 14 CAPSULE | Refills: 0 | Status: SHIPPED | OUTPATIENT
Start: 2020-02-17 | End: 2020-02-24

## 2020-02-17 NOTE — TELEPHONE ENCOUNTER
Is she taking Torsemide daily?   Also, advise her to wear compression stocking on RLE.   If she notices any increased warm or color change, fever, could be cellulitis starting. If this does occur, have her contact our office and will treat with antibiotic.

## 2020-02-17 NOTE — TELEPHONE ENCOUNTER
Taking Torsemide every other day due to excessive urinating when taken daily. Pt aware to contact office if s/s worsen.

## 2020-02-17 NOTE — TELEPHONE ENCOUNTER
Informed pt of results. She stated she wanted to know what it was, it is still hurting and is swollen.

## 2020-02-17 NOTE — PATIENT INSTRUCTIONS
Go to the nearest ER or return to clinic if symptoms worsen, fever/chill develop    Sinusitis, Adult  Sinusitis is soreness and swelling (inflammation) of your sinuses. Sinuses are hollow spaces in the bones around your face. They are located:  · Around your eyes.  · In the middle of your forehead.  · Behind your nose.  · In your cheekbones.  Your sinuses and nasal passages are lined with a fluid called mucus. Mucus drains out of your sinuses. Swelling can trap mucus in your sinuses. This lets germs (bacteria, virus, or fungus) grow, which leads to infection. Most of the time, this condition is caused by a virus.  What are the causes?  This condition is caused by:  · Allergies.  · Asthma.  · Germs.  · Things that block your nose or sinuses.  · Growths in the nose (nasal polyps).  · Chemicals or irritants in the air.  · Fungus (rare).  What increases the risk?  You are more likely to develop this condition if:  · You have a weak body defense system (immune system).  · You do a lot of swimming or diving.  · You use nasal sprays too much.  · You smoke.  What are the signs or symptoms?  The main symptoms of this condition are pain and a feeling of pressure around the sinuses. Other symptoms include:  · Stuffy nose (congestion).  · Runny nose (drainage).  · Swelling and warmth in the sinuses.  · Headache.  · Toothache.  · A cough that may get worse at night.  · Mucus that collects in the throat or the back of the nose (postnasal drip).  · Being unable to smell and taste.  · Being very tired (fatigue).  · A fever.  · Sore throat.  · Bad breath.  How is this diagnosed?  This condition is diagnosed based on:  · Your symptoms.  · Your medical history.  · A physical exam.  · Tests to find out if your condition is short-term (acute) or long-term (chronic). Your doctor may:  ? Check your nose for growths (polyps).  ? Check your sinuses using a tool that has a light (endoscope).  ? Check for allergies or germs.  ? Do imaging  tests, such as an MRI or CT scan.  How is this treated?  Treatment for this condition depends on the cause and whether it is short-term or long-term.  · If caused by a virus, your symptoms should go away on their own within 10 days. You may be given medicines to relieve symptoms. They include:  ? Medicines that shrink swollen tissue in the nose.  ? Medicines that treat allergies (antihistamines).  ? A spray that treats swelling of the nostrils.   ? Rinses that help get rid of thick mucus in your nose (nasal saline washes).  · If caused by bacteria, your doctor may wait to see if you will get better without treatment. You may be given antibiotic medicine if you have:  ? A very bad infection.  ? A weak body defense system.  · If caused by growths in the nose, you may need to have surgery.  Follow these instructions at home:  Medicines  · Take, use, or apply over-the-counter and prescription medicines only as told by your doctor. These may include nasal sprays.  · If you were prescribed an antibiotic medicine, take it as told by your doctor. Do not stop taking the antibiotic even if you start to feel better.  Hydrate and humidify    · Drink enough water to keep your pee (urine) pale yellow.  · Use a cool mist humidifier to keep the humidity level in your home above 50%.  · Breathe in steam for 10-15 minutes, 3-4 times a day, or as told by your doctor. You can do this in the bathroom while a hot shower is running.  · Try not to spend time in cool or dry air.  Rest  · Rest as much as you can.  · Sleep with your head raised (elevated).  · Make sure you get enough sleep each night.  General instructions    · Put a warm, moist washcloth on your face 3-4 times a day, or as often as told by your doctor. This will help with discomfort.  · Wash your hands often with soap and water. If there is no soap and water, use hand .  · Do not smoke. Avoid being around people who are smoking (secondhand smoke).  · Keep all  follow-up visits as told by your doctor. This is important.  Contact a doctor if:  · You have a fever.  · Your symptoms get worse.  · Your symptoms do not get better within 10 days.  Get help right away if:  · You have a very bad headache.  · You cannot stop throwing up (vomiting).  · You have very bad pain or swelling around your face or eyes.  · You have trouble seeing.  · You feel confused.  · Your neck is stiff.  · You have trouble breathing.  Summary  · Sinusitis is swelling of your sinuses. Sinuses are hollow spaces in the bones around your face.  · This condition is caused by tissues in your nose that become inflamed or swollen. This traps germs. These can lead to infection.  · If you were prescribed an antibiotic medicine, take it as told by your doctor. Do not stop taking it even if you start to feel better.  · Keep all follow-up visits as told by your doctor. This is important.  This information is not intended to replace advice given to you by your health care provider. Make sure you discuss any questions you have with your health care provider.  Document Released: 06/05/2009 Document Revised: 05/20/2019 Document Reviewed: 05/20/2019  [employer] Interactive Patient Education © 2019 [employer]

## 2020-02-17 NOTE — PROGRESS NOTES
Subjective   Taina Moreno is a 69 y.o. female.   Chief Complaint   Patient presents with   • Sinus pressure     x 1wk    • Calf pain - R leg     x 3d      Sinusitis   This is a new problem. The current episode started in the past 7 days. The problem has been gradually worsening since onset. There has been no fever. Her pain is at a severity of 9/10. The pain is mild. Associated symptoms include chills, congestion, ear pain, headaches, a hoarse voice, sinus pressure and a sore throat. Pertinent negatives include no coughing or shortness of breath. Treatments tried: OTC cough syrup and cold/sinus treatment. The treatment provided no relief.      Right calf pain x 3 days. Symptoms have been worsening.   Movement and dorsiflexion of foot makes pain worse.   Thought that it was neuropathy initially, tried to treat with compression stocking without improvement.   No color change or increased warmth.   Right lower extremity does appear larger than the left.     The following portions of the patient's history were reviewed and updated as appropriate: allergies, current medications, past family history, past medical history, past social history, past surgical history and problem list.    Review of Systems   Constitutional: Positive for chills. Negative for fever.   HENT: Positive for congestion, ear pain, hoarse voice, postnasal drip, sinus pressure and sore throat.    Respiratory: Negative for cough and shortness of breath.    Cardiovascular: Positive for leg swelling (RLE). Negative for chest pain and palpitations.   Gastrointestinal: Negative for nausea and vomiting.   Musculoskeletal:        Right leg pain and edema     Neurological: Positive for headache. Negative for light-headedness and confusion.   Hematological: Negative.        Objective   Physical Exam   Constitutional: She appears well-developed and well-nourished.   HENT:   Head: Normocephalic and atraumatic.   Right Ear: Hearing, tympanic membrane,  external ear and ear canal normal.   Left Ear: Hearing, tympanic membrane, external ear and ear canal normal.   Nose: Congestion present. Right sinus exhibits maxillary sinus tenderness. Left sinus exhibits maxillary sinus tenderness.   Mouth/Throat: Uvula is midline, oropharynx is clear and moist and mucous membranes are normal.   Eyes: Conjunctivae are normal.   Neck: Neck supple.   Cardiovascular: Normal rate, regular rhythm and normal heart sounds.   Pulmonary/Chest: Effort normal and breath sounds normal. She has no wheezes.   Musculoskeletal: She exhibits no deformity.        Right lower leg: She exhibits tenderness (calf, +Candelario's sign), swelling and edema.   Lymphadenopathy:     She has no cervical adenopathy.   Neurological: She is alert.   Skin: Skin is warm and dry.   Psychiatric: Her behavior is normal.   Nursing note and vitals reviewed.        Assessment/Plan   Taina was seen today for sinus pressure and calf pain - r leg.    Diagnoses and all orders for this visit:    Leg edema, right  -     Duplex Venous Lower Extremity - Right CAR    Acute non-recurrent maxillary sinusitis  -     cephalexin (KEFLEX) 500 MG capsule; Take 1 capsule by mouth 2 (Two) Times a Day for 7 days.  -     fluconazole (DIFLUCAN) 150 MG tablet; Take 1 tablet by mouth Every 72 (Seventy-Two) Hours.      Stat venous duplex to rule out DVT RLE.     Keflex to improve sinusitis. She has tolerated this medication previously without reaction.   Ok for diflucan if yeast infection develops after abx use.       Addendum:  Venous duplex is negative for DVT.

## 2020-04-20 RX ORDER — ALLOPURINOL 100 MG/1
TABLET ORAL
Qty: 60 TABLET | Refills: 0 | Status: SHIPPED | OUTPATIENT
Start: 2020-04-20 | End: 2020-07-10

## 2020-06-02 NOTE — PROGRESS NOTES
Subjective   Taina Moreno is a 70 y.o. female.   Chief Complaint   Patient presents with   • Follow-up   • Diabetes   • HTN & HLP (see's Dr. Sanchez)     pt believes she seen him last in Feb   • Vitamin D Deficiency   • B12 def   • elevated LFT's   • sleep disturbance   • needs to sched Medicare Wellness     History of Present Illness     Patient returns today for follow-up of chronic medical conditions as noted above.      I last saw her in December however that was for an acute visit.  Really not checked on her diabetes for some time now.    Over the last several weeks, she has generally been feeling stressed with COVID, her mom's death and probate of her state.    Sinus HAs and PND    Not checking her blood sugars on any kind of regular basis.  She is concerned that her hemoglobin A1c will be up as she is not been following much of a diet since the pandemic hit.    Her gout has been - been quiet - no flares lately.    Last significant lab work looking at her overall diabetes, B12, vitamin D etc. was late July early August of last year.  Her hemoglobin A1c was 7.2%.  Still had elevations in her LFTs believed due to her diabetes.  Had advised her on a primarily plant-based/[employer] diet as much as she could.  Although she agreed to the nutritionist referral at the time, she did not follow through.    Zuleima - says she did it and it was negative - I cannot find the result.    She is due for mammogram as well as her diabetic eye exam in addition to lab work.    She is pleased with the tizanidine.  Helps with both her back pain and actually helps her rest better at night.  Using on a regular basis.    The following portions of the patient's history were reviewed and updated as appropriate: allergies, current medications, past medical history, past social history and problem list.    Review of Systems   Constitutional: Negative.  Negative for fever.   HENT: Negative.    Eyes: Negative.  Negative for visual  disturbance.   Respiratory: Negative.  Negative for shortness of breath.    Cardiovascular: Positive for leg swelling (unchanged ). Negative for chest pain.   Gastrointestinal: Negative.    Endocrine: Negative for polydipsia and polyuria.   Genitourinary: Negative.    Musculoskeletal: Positive for arthralgias (generalized - relatively stable).   Skin: Negative.    Neurological: Negative for weakness, headache and confusion.   Psychiatric/Behavioral: Positive for sleep disturbance.       Objective   Physical Exam   Constitutional: She appears well-developed. No distress.   HENT:   Head: Normocephalic.   Eyes: No scleral icterus.   Neck: Neck supple. No JVD present. Carotid bruit is not present.   Cardiovascular: Normal rate and regular rhythm.   Pulmonary/Chest: Effort normal and breath sounds normal. She has no wheezes.   Musculoskeletal: She exhibits no edema.    Taina had a diabetic foot exam performed today.    Neurological Sensory Findings -  Unaltered sharp/dull right ankle/foot discrimination and unaltered sharp/dull left ankle/foot discrimination.  Neurological: She is alert. A sensory deficit ( Slight decreased sensation to light touch on the soles of her feet) is present.   Psychiatric: She has a normal mood and affect. Thought content normal.   Nursing note and vitals reviewed.        Assessment/Plan   Taina was seen today for follow-up, diabetes, htn & hlp (see's dr. rojas), vitamin d deficiency, b12 def, elevated lft's, sleep disturbance and needs to sched medicare wellness.    Diagnoses and all orders for this visit:    Type 2 diabetes mellitus with microalbuminuria, without long-term current use of insulin (CMS/[facility])  Comments:  Do not see much evidence for lifestyle/nutrition modifications.  Will likely see a similar A1c or worsening.  Orders:  -     Hemoglobin A1c  -     MicroAlbumin, Urine, Random - Urine, Clean Catch    Essential hypertension  Comments:  appears controlled-no changes.  At home  values have been okay.  This is the first time she is been out during the pandemic and suspect SBP is 2ndary   Orders:  -     Comprehensive Metabolic Panel    Mixed hyperlipidemia  Comments:  Recheck today  Orders:  -     Lipid Panel With LDL / HDL Ratio    Abnormal liver enzymes  Comments:  Very likely due to PORTILLO and her diabetes.  Recheck today. Consider adding milk thistle if still elevated  Orders:  -     Comprehensive Metabolic Panel    Encounter for immunization  -     Pneumococcal Polysaccharide Vaccine 23-Valent Greater Than or Equal To 1yo Subcutaneous / IM    Encounter for screening mammogram for breast cancer  Comments:  Overdue for mammogram we will try to schedule before she leaves today  Orders:  -     Mammo Screening Digital Tomosynthesis Bilateral With CAD; Future    Vitamin D deficiency   -     Vitamin D 25 Hydroxy    Cobalamin deficiency  -     Vitamin B12    Idiopathic gout, unspecified chronicity, unspecified site  Comments:  Check uric acid level today.  Need to be cautious with her loop diuretics potentially aggravating things  Orders:  -     Uric Acid    Acute midline thoracic back pain  -     tiZANidine (ZANAFLEX) 4 MG tablet; 1-2 po q 8 hours prn spasm    Other orders  -     montelukast (Singulair) 10 MG tablet; Take 1 tablet by mouth Every Night.  -     famotidine (Pepcid) 40 MG tablet; Take 1 tablet by mouth Daily.    Like to get caught up on today.  Labs as noted above.  Urged her to follow through with diabetic eye exam as well as her mammogram.    Also, urged to daily examinations of her feet.  The sensation issues in her feet are likely to get worse if she does not reverse her diabetes.    Also discussed with her colon cancer screening again.    We will add Singulair to her Flonase and Claritin as noted above.    It also been using ranitidine as needed and I have changed her over to Pepcid famotidine daily    Recheck in 3 months or so         Michael Nation MD  06/03/2020

## 2020-06-03 ENCOUNTER — OFFICE VISIT (OUTPATIENT)
Dept: FAMILY MEDICINE CLINIC | Facility: CLINIC | Age: 70
End: 2020-06-03

## 2020-06-03 ENCOUNTER — TELEPHONE (OUTPATIENT)
Dept: FAMILY MEDICINE CLINIC | Facility: CLINIC | Age: 70
End: 2020-06-03

## 2020-06-03 VITALS
DIASTOLIC BLOOD PRESSURE: 80 MMHG | BODY MASS INDEX: 30.02 KG/M2 | TEMPERATURE: 98.2 F | SYSTOLIC BLOOD PRESSURE: 150 MMHG | RESPIRATION RATE: 16 BRPM | HEIGHT: 65 IN | HEART RATE: 68 BPM | WEIGHT: 180.2 LBS

## 2020-06-03 DIAGNOSIS — E11.29 TYPE 2 DIABETES MELLITUS WITH MICROALBUMINURIA, WITHOUT LONG-TERM CURRENT USE OF INSULIN (HCC): Primary | ICD-10-CM

## 2020-06-03 DIAGNOSIS — Z23 ENCOUNTER FOR IMMUNIZATION: ICD-10-CM

## 2020-06-03 DIAGNOSIS — I10 ESSENTIAL HYPERTENSION: ICD-10-CM

## 2020-06-03 DIAGNOSIS — E55.9 VITAMIN D DEFICIENCY: ICD-10-CM

## 2020-06-03 DIAGNOSIS — E53.8 COBALAMIN DEFICIENCY: ICD-10-CM

## 2020-06-03 DIAGNOSIS — Z12.31 ENCOUNTER FOR SCREENING MAMMOGRAM FOR BREAST CANCER: ICD-10-CM

## 2020-06-03 DIAGNOSIS — M10.00 IDIOPATHIC GOUT, UNSPECIFIED CHRONICITY, UNSPECIFIED SITE: ICD-10-CM

## 2020-06-03 DIAGNOSIS — R80.9 TYPE 2 DIABETES MELLITUS WITH MICROALBUMINURIA, WITHOUT LONG-TERM CURRENT USE OF INSULIN (HCC): Primary | ICD-10-CM

## 2020-06-03 DIAGNOSIS — M54.6 ACUTE MIDLINE THORACIC BACK PAIN: ICD-10-CM

## 2020-06-03 DIAGNOSIS — R74.8 ABNORMAL LIVER ENZYMES: ICD-10-CM

## 2020-06-03 DIAGNOSIS — E78.2 MIXED HYPERLIPIDEMIA: ICD-10-CM

## 2020-06-03 PROCEDURE — 99214 OFFICE O/P EST MOD 30 MIN: CPT | Performed by: FAMILY MEDICINE

## 2020-06-03 RX ORDER — FAMOTIDINE 40 MG/1
40 TABLET, FILM COATED ORAL DAILY
Qty: 90 TABLET | Refills: 3 | Status: SHIPPED | OUTPATIENT
Start: 2020-06-03 | End: 2021-02-01 | Stop reason: SDUPTHER

## 2020-06-03 RX ORDER — MONTELUKAST SODIUM 10 MG/1
10 TABLET ORAL NIGHTLY
Qty: 90 TABLET | Refills: 1 | Status: SHIPPED | OUTPATIENT
Start: 2020-06-03 | End: 2021-02-01 | Stop reason: SDUPTHER

## 2020-06-03 RX ORDER — TIZANIDINE 4 MG/1
TABLET ORAL
Qty: 60 TABLET | Refills: 3 | Status: SHIPPED | OUTPATIENT
Start: 2020-06-03 | End: 2021-08-12 | Stop reason: ALTCHOICE

## 2020-06-06 LAB
25(OH)D3+25(OH)D2 SERPL-MCNC: 24 NG/ML (ref 30–100)
ALBUMIN SERPL-MCNC: 5.1 G/DL (ref 3.5–5.2)
ALBUMIN/GLOB SERPL: 2 G/DL
ALP SERPL-CCNC: 80 U/L (ref 39–117)
ALT SERPL-CCNC: 43 U/L (ref 1–33)
AST SERPL-CCNC: 46 U/L (ref 1–32)
BILIRUB SERPL-MCNC: 0.8 MG/DL (ref 0.2–1.2)
BUN SERPL-MCNC: 9 MG/DL (ref 8–23)
BUN/CREAT SERPL: 12.2 (ref 7–25)
CALCIUM SERPL-MCNC: 9.9 MG/DL (ref 8.6–10.5)
CHLORIDE SERPL-SCNC: 100 MMOL/L (ref 98–107)
CHOLEST SERPL-MCNC: 121 MG/DL (ref 0–200)
CO2 SERPL-SCNC: 27.6 MMOL/L (ref 22–29)
CREAT SERPL-MCNC: 0.74 MG/DL (ref 0.57–1)
GLOBULIN SER CALC-MCNC: 2.5 GM/DL
GLUCOSE SERPL-MCNC: 124 MG/DL (ref 65–99)
HBA1C MFR BLD: 7.01 % (ref 4.8–5.6)
HDLC SERPL-MCNC: 40 MG/DL (ref 40–60)
LDLC SERPL CALC-MCNC: 42 MG/DL (ref 0–100)
LDLC/HDLC SERPL: 1.06 {RATIO}
MICROALBUMIN UR-MCNC: 42.1 UG/ML
POTASSIUM SERPL-SCNC: 3.7 MMOL/L (ref 3.5–5.2)
PROT SERPL-MCNC: 7.6 G/DL (ref 6–8.5)
SODIUM SERPL-SCNC: 138 MMOL/L (ref 136–145)
TRIGL SERPL-MCNC: 193 MG/DL (ref 0–150)
URATE SERPL-MCNC: 6 MG/DL (ref 2.4–5.7)
VIT B12 SERPL-MCNC: 389 PG/ML (ref 211–946)
VLDLC SERPL CALC-MCNC: 38.6 MG/DL (ref 5–40)

## 2020-06-11 DIAGNOSIS — I10 ESSENTIAL HYPERTENSION: ICD-10-CM

## 2020-06-11 RX ORDER — ESTRADIOL 1 MG/1
TABLET ORAL
Qty: 90 TABLET | Refills: 0 | Status: SHIPPED | OUTPATIENT
Start: 2020-06-11 | End: 2020-09-11

## 2020-06-11 RX ORDER — IRBESARTAN 150 MG/1
TABLET ORAL
Qty: 90 TABLET | Refills: 0 | Status: SHIPPED | OUTPATIENT
Start: 2020-06-11 | End: 2020-09-07

## 2020-07-10 RX ORDER — ALLOPURINOL 100 MG/1
TABLET ORAL
Qty: 60 TABLET | Refills: 0 | Status: SHIPPED | OUTPATIENT
Start: 2020-07-10 | End: 2020-09-06

## 2020-07-15 DIAGNOSIS — B37.31 YEAST VAGINITIS: ICD-10-CM

## 2020-07-15 RX ORDER — FLUCONAZOLE 150 MG/1
TABLET ORAL
Qty: 1 TABLET | Refills: 0 | OUTPATIENT
Start: 2020-07-15

## 2020-09-06 RX ORDER — ALLOPURINOL 100 MG/1
TABLET ORAL
Qty: 60 TABLET | Refills: 0 | Status: SHIPPED | OUTPATIENT
Start: 2020-09-06 | End: 2020-11-30

## 2020-09-07 DIAGNOSIS — I10 ESSENTIAL HYPERTENSION: ICD-10-CM

## 2020-09-07 RX ORDER — GLIMEPIRIDE 1 MG/1
TABLET ORAL
Qty: 180 TABLET | Refills: 0 | Status: SHIPPED | OUTPATIENT
Start: 2020-09-07 | End: 2021-02-01 | Stop reason: SDUPTHER

## 2020-09-07 RX ORDER — IRBESARTAN 150 MG/1
TABLET ORAL
Qty: 90 TABLET | Refills: 0 | Status: SHIPPED | OUTPATIENT
Start: 2020-09-07 | End: 2020-11-11

## 2020-09-11 RX ORDER — ESTRADIOL 1 MG/1
TABLET ORAL
Qty: 90 TABLET | Refills: 0 | Status: SHIPPED | OUTPATIENT
Start: 2020-09-11 | End: 2020-11-30

## 2020-10-01 PROBLEM — E11.42 DIABETIC POLYNEUROPATHY ASSOCIATED WITH TYPE 2 DIABETES MELLITUS: Status: ACTIVE | Noted: 2020-10-01

## 2020-10-01 NOTE — PROGRESS NOTES
Subjective   Taina Moreno is a 70 y.o. female.   Chief Complaint   Patient presents with   • Follow-up     NOTE: pt has had juice today    • HTN & HLP (followed by Dr. Sanchez)     discuss Irbesartan 150mg    • Diabetes   • Vitamin D Deficiency   • elevated LFT's   • B12 def   • pain in feet     thinks related to diabetes    • due for Medicare Wellness     pt denied and does not want to schedule    • pain in L side     was told by Nephro it was not renal stone related, approx 1 month ago    • sinus congestion, drainage, cough, watery eyes     History of Present Illness     Patient returns today for follow-up of chronic medical conditions as noted above.      Taina returns today for follow-up.  I last saw her in early June.  She complains of pain in her left side, pain from diabetic neuropathy in her feet and some trouble with affording 1 of her blood pressure medications    At her last visit, lab work showed that her diabetes was a little bit better.  We still saw some inflammation in her liver via her transaminases but it was stable and likely due to her diabetes.  Asked her to continue to eat more vegetables and whole foods.  Avoid processed foods whenever possible.  The only medication change made at that visit was to increase her vitamin D    In general, over the last few weeks, she has been feeling better overall but having trouble with a acute sinus infection.  Drainage congestion and intermittent sore throat.  Minimal cough.  No fever.    Left side pain - started with kidney stone (?) but xrays and urinalysis okay.    Diabetic eye exam - been a year + knows that she needs a follow-up exam    Needs follow-up today on her vitamin D deficiency and abnormal LFTs.    Try to watch her diet a little bit better over the last couple of months.    Stress level in her life is a little bit better overall as well    The following portions of the patient's history were reviewed and updated as appropriate: allergies,  current medications, past medical history, past social history and problem list.    Review of Systems   Constitutional: Negative.  Negative for fever and unexpected weight loss.   HENT: Negative.    Eyes: Negative.  Negative for visual disturbance.   Respiratory: Negative.  Negative for shortness of breath.    Cardiovascular: Positive for leg swelling (unchanged ). Negative for chest pain.   Gastrointestinal: Negative.    Endocrine: Negative for polyuria.   Genitourinary: Negative.    Musculoskeletal: Positive for arthralgias (generalized - relatively stable).        Left flank pain as noted in HPI   Skin: Negative.    Neurological: Positive for numbness (and pain in feet). Negative for weakness, headache and confusion.       Objective   Physical Exam  Vitals signs and nursing note reviewed.   Constitutional:       General: She is not in acute distress.     Appearance: Normal appearance. She is well-developed.   HENT:      Right Ear: Tympanic membrane normal.      Left Ear: Tympanic membrane normal.      Nose: Congestion present.      Mouth/Throat:      Mouth: Mucous membranes are moist.      Pharynx: Posterior oropharyngeal erythema present.   Eyes:      General: No scleral icterus.  Neck:      Musculoskeletal: Neck supple.      Vascular: No carotid bruit.   Cardiovascular:      Rate and Rhythm: Normal rate and regular rhythm.      Heart sounds: No murmur.   Pulmonary:      Effort: Pulmonary effort is normal.      Breath sounds: Normal breath sounds.   Musculoskeletal:      Comments: Some tight left-sided paraspinous muscles that are tender to palpation and reproduce some of her pain.  No bony tenderness.  Ambulates without assistance.   Lymphadenopathy:      Cervical: No cervical adenopathy.   Skin:     General: Skin is warm and dry.   Neurological:      Mental Status: She is alert.      Sensory: Sensory deficit present.      Motor: No abnormal muscle tone.   Psychiatric:         Mood and Affect: Mood normal.          Thought Content: Thought content normal.           Assessment/Plan   Taina was seen today for follow-up, htn & hlp (followed by dr. rojas), diabetes, vitamin d deficiency, elevated lft's, b12 def, pain in feet, due for medicare wellness, pain in l side and sinus congestion, drainage, cough, watery eyes.    Diagnoses and all orders for this visit:    Type 2 diabetes mellitus with microalbuminuria, without long-term current use of insulin (CMS/[facility])  -     Hemoglobin A1c    Essential hypertension  -     Comprehensive Metabolic Panel    Abnormal liver enzymes    Cobalamin deficiency    Left flank pain  Comments:  I think musculoskeletal.  Continue Zanaflex as needed, may need PT  Orders:  -     Comprehensive Metabolic Panel  -     Urinalysis With Microscopic If Indicated (No Culture) - Urine, Clean Catch    Diabetic polyneuropathy associated with type 2 diabetes mellitus (CMS/[facility])    Mixed hyperlipidemia  -     Lipid Panel With LDL / HDL Ratio    Vitamin D deficiency   -     Vitamin D 25 Hydroxy    Acute maxillary sinusitis, recurrence not specified  -     azithromycin (Zithromax) 250 MG tablet; 2 po q day x 1, then 1 po q day x 4  -     fluconazole (Diflucan) 150 MG tablet; Take 1 tablet by mouth 1 (One) Time for 1 dose.    Increase fluids.  Try to  on her exercise/activity level and include some kind of resistance training.  I will recheck with her in 4 5 months sooner should labs dictate.  Reminder to follow through with eye exam               Michael Nation MD  10/05/2020

## 2020-10-05 ENCOUNTER — OFFICE VISIT (OUTPATIENT)
Dept: FAMILY MEDICINE CLINIC | Facility: CLINIC | Age: 70
End: 2020-10-05

## 2020-10-05 VITALS
HEIGHT: 65 IN | DIASTOLIC BLOOD PRESSURE: 60 MMHG | WEIGHT: 183 LBS | RESPIRATION RATE: 20 BRPM | SYSTOLIC BLOOD PRESSURE: 120 MMHG | TEMPERATURE: 98.2 F | HEART RATE: 68 BPM | BODY MASS INDEX: 30.49 KG/M2

## 2020-10-05 DIAGNOSIS — I10 ESSENTIAL HYPERTENSION: ICD-10-CM

## 2020-10-05 DIAGNOSIS — E78.2 MIXED HYPERLIPIDEMIA: ICD-10-CM

## 2020-10-05 DIAGNOSIS — R10.9 LEFT FLANK PAIN: ICD-10-CM

## 2020-10-05 DIAGNOSIS — R74.8 ABNORMAL LIVER ENZYMES: ICD-10-CM

## 2020-10-05 DIAGNOSIS — R80.9 TYPE 2 DIABETES MELLITUS WITH MICROALBUMINURIA, WITHOUT LONG-TERM CURRENT USE OF INSULIN (HCC): Primary | ICD-10-CM

## 2020-10-05 DIAGNOSIS — E11.29 TYPE 2 DIABETES MELLITUS WITH MICROALBUMINURIA, WITHOUT LONG-TERM CURRENT USE OF INSULIN (HCC): Primary | ICD-10-CM

## 2020-10-05 DIAGNOSIS — E53.8 COBALAMIN DEFICIENCY: ICD-10-CM

## 2020-10-05 DIAGNOSIS — J01.00 ACUTE MAXILLARY SINUSITIS, RECURRENCE NOT SPECIFIED: ICD-10-CM

## 2020-10-05 DIAGNOSIS — E11.42 DIABETIC POLYNEUROPATHY ASSOCIATED WITH TYPE 2 DIABETES MELLITUS (HCC): ICD-10-CM

## 2020-10-05 DIAGNOSIS — E55.9 VITAMIN D DEFICIENCY: ICD-10-CM

## 2020-10-05 PROCEDURE — 99214 OFFICE O/P EST MOD 30 MIN: CPT | Performed by: FAMILY MEDICINE

## 2020-10-05 RX ORDER — FLUCONAZOLE 150 MG/1
150 TABLET ORAL ONCE
Qty: 1 TABLET | Refills: 0 | Status: SHIPPED | OUTPATIENT
Start: 2020-10-05 | End: 2020-10-05

## 2020-10-05 RX ORDER — AZITHROMYCIN 250 MG/1
TABLET, FILM COATED ORAL
Qty: 6 TABLET | Refills: 0 | Status: SHIPPED | OUTPATIENT
Start: 2020-10-05 | End: 2021-01-30

## 2020-10-06 LAB
25(OH)D3+25(OH)D2 SERPL-MCNC: 49.9 NG/ML (ref 30–100)
ALBUMIN SERPL-MCNC: 4.6 G/DL (ref 3.5–5.2)
ALBUMIN/GLOB SERPL: 2.1 G/DL
ALP SERPL-CCNC: 81 U/L (ref 39–117)
ALT SERPL-CCNC: 38 U/L (ref 1–33)
APPEARANCE UR: ABNORMAL
AST SERPL-CCNC: 41 U/L (ref 1–32)
BACTERIA #/AREA URNS HPF: ABNORMAL /[HPF]
BILIRUB SERPL-MCNC: 0.9 MG/DL (ref 0–1.2)
BILIRUB UR QL STRIP: NEGATIVE
BUN SERPL-MCNC: 9 MG/DL (ref 8–23)
BUN/CREAT SERPL: 13.4 (ref 7–25)
CALCIUM SERPL-MCNC: 9.1 MG/DL (ref 8.6–10.5)
CHLORIDE SERPL-SCNC: 103 MMOL/L (ref 98–107)
CHOLEST SERPL-MCNC: 126 MG/DL (ref 0–200)
CO2 SERPL-SCNC: 25.8 MMOL/L (ref 22–29)
COLOR UR: YELLOW
CREAT SERPL-MCNC: 0.67 MG/DL (ref 0.57–1)
EPI CELLS #/AREA URNS HPF: ABNORMAL /HPF (ref 0–10)
GLOBULIN SER CALC-MCNC: 2.2 GM/DL
GLUCOSE SERPL-MCNC: 178 MG/DL (ref 65–99)
GLUCOSE UR QL: NEGATIVE
HBA1C MFR BLD: 7.1 % (ref 4.8–5.6)
HDLC SERPL-MCNC: 41 MG/DL (ref 40–60)
HGB UR QL STRIP: NEGATIVE
KETONES UR QL STRIP: NEGATIVE
LDLC SERPL CALC-MCNC: 47 MG/DL (ref 0–100)
LDLC/HDLC SERPL: 1.14 {RATIO}
LEUKOCYTE ESTERASE UR QL STRIP: ABNORMAL
MICRO URNS: ABNORMAL
MUCOUS THREADS URNS QL MICRO: PRESENT
NITRITE UR QL STRIP: POSITIVE
PH UR STRIP: 5.5 [PH] (ref 5–7.5)
POTASSIUM SERPL-SCNC: 4 MMOL/L (ref 3.5–5.2)
PROT SERPL-MCNC: 6.8 G/DL (ref 6–8.5)
PROT UR QL STRIP: ABNORMAL
RBC #/AREA URNS HPF: ABNORMAL /HPF (ref 0–2)
SODIUM SERPL-SCNC: 140 MMOL/L (ref 136–145)
SP GR UR: 1.02 (ref 1–1.03)
TRIGL SERPL-MCNC: 192 MG/DL (ref 0–150)
UROBILINOGEN UR STRIP-MCNC: 0.2 MG/DL (ref 0.2–1)
VLDLC SERPL CALC-MCNC: 38.4 MG/DL
WBC #/AREA URNS HPF: ABNORMAL /HPF (ref 0–5)

## 2020-10-07 RX ORDER — SULFAMETHOXAZOLE AND TRIMETHOPRIM 800; 160 MG/1; MG/1
1 TABLET ORAL 2 TIMES DAILY
Qty: 14 TABLET | Refills: 0 | Status: SHIPPED | OUTPATIENT
Start: 2020-10-07 | End: 2020-11-11

## 2020-11-04 ENCOUNTER — TELEPHONE (OUTPATIENT)
Dept: FAMILY MEDICINE CLINIC | Facility: CLINIC | Age: 70
End: 2020-11-04

## 2020-11-04 DIAGNOSIS — R10.9 LEFT FLANK PAIN: Primary | ICD-10-CM

## 2020-11-04 NOTE — TELEPHONE ENCOUNTER
PATIENT STATES SHE IS STILL HAVING LEFT SIDE PAIN  IT IS GETTING WORSE, MORE SEVERE AND MORE FREQUENT   SHE WOULD LIKE TO GET AN XRAY     PLEASE CALL IF ORDER CAN BE PLACED    PATIENT CALL BACK [phone]

## 2020-11-05 ENCOUNTER — HOSPITAL ENCOUNTER (OUTPATIENT)
Dept: GENERAL RADIOLOGY | Facility: HOSPITAL | Age: 70
Discharge: HOME | End: 2020-11-05
Admitting: FAMILY MEDICINE

## 2020-11-05 ENCOUNTER — TELEPHONE (OUTPATIENT)
Dept: FAMILY MEDICINE CLINIC | Facility: CLINIC | Age: 70
End: 2020-11-05

## 2020-11-05 DIAGNOSIS — R10.9 LEFT FLANK PAIN: ICD-10-CM

## 2020-11-05 PROCEDURE — 74018 RADEX ABDOMEN 1 VIEW: CPT

## 2020-11-05 NOTE — TELEPHONE ENCOUNTER
Informed pt that I would request CT report and ER notes from [facility] when medical records opens in the morning. In the meantime, pt could not tell me if it was obstructing or a nonobstructing stone. I informed her if her pain worsened go on to [facility] ER where they can consult in a urologist. She agreed.    Once you view the reports:   Pt requesting a referral to anyone but Dr. Rodriguez. She last seen Dr. Pierce but he has retired. She is willing to see someone in that group.

## 2020-11-05 NOTE — TELEPHONE ENCOUNTER
PATIENT CALLED, IN A LOT OF PAIN.  PATIENT WOULD LIKE TO KNOW SOMETHING AS SOON AS XRAY RESULTS ARE BACK.     BEST CALL NUMBER [phone]

## 2020-11-05 NOTE — TELEPHONE ENCOUNTER
Informed pt of Dr. Nation's recommendations. When I informed her what he said, she admitted to being a long time user of Imodium because when she goes out the Metformin usually causes issues.

## 2020-11-05 NOTE — TELEPHONE ENCOUNTER
PATIENT CALLED REQUESTING A CALL BACK DUE TO HAVING A KIDNEY STONE 4CM.     PATIENT WAS SEEN IN [facility] ER TODAY AND WAS TOLD SHE NEEDS TO SEE A UROLOGIST    PATIENT IS REQUESTING A CALL BACK TO BE ADVISED ON HER NEXT STEP AND TO DISCUSS GETTING REFERRAL TO UROLOGIST    CALL BACK NUMBER [phone]

## 2020-11-05 NOTE — TELEPHONE ENCOUNTER
Informed pt and she is not taking anything because she feels like she goes fairly regularly with her Metformin. I informed her regardless she is not passing as much as she thinks and has pretty significant constipation.     She wants to know what you recommend to take?

## 2020-11-05 NOTE — TELEPHONE ENCOUNTER
Obviously, will need the records.  I would guess it is nonobstructing given the fact that they just simply told her that she needed to see a urologist and did not make any arrangements.  She saw Dr. Tacho Shrestha about 3 months ago at the [facility].  She likely does not need a new referral since she is already established with him.  He took over for Dr. Pierce.  Can we call and see if she can get in there?

## 2020-11-05 NOTE — TELEPHONE ENCOUNTER
Please call.  The x-ray shows no obstruction but she has moderate to large amount of stool present.  Is she taking any stool softeners or laxatives?

## 2020-11-05 NOTE — TELEPHONE ENCOUNTER
Constipation is what I suspected. Miralax (or generic) q day to bid, increase water intake and let us know if not significant BMs over the next few weeks. If works well, I would recommend she continue it on a regular basis - it's very safe.

## 2020-11-11 ENCOUNTER — TELEPHONE (OUTPATIENT)
Dept: FAMILY MEDICINE CLINIC | Facility: CLINIC | Age: 70
End: 2020-11-11

## 2020-11-11 RX ORDER — VALSARTAN 160 MG/1
160 TABLET ORAL DAILY
Qty: 90 TABLET | Refills: 3 | Status: SHIPPED | OUTPATIENT
Start: 2020-11-11 | End: 2020-11-11

## 2020-11-11 RX ORDER — LOSARTAN POTASSIUM 100 MG/1
100 TABLET ORAL DAILY
Qty: 90 TABLET | Refills: 3 | Status: SHIPPED | OUTPATIENT
Start: 2020-11-11 | End: 2021-01-30

## 2020-11-11 NOTE — TELEPHONE ENCOUNTER
PT PHARMACY CALLED TO REQUEST IF RX  valsartan (Diovan) 160 MG tablet,  COULD BE SWITCHED TO LOSARTAN 100MG.    PLEASE  ADVISE.  CALL BACK:[phone]    [facility] - [place], [place] - [facility] [address]

## 2020-11-11 NOTE — TELEPHONE ENCOUNTER
PATIENT CALLED AND SAID THAT THE PRESCRIPTION FOR valsartan (Diovan) 160 MG tablet IS OVER $100 EACH PRESCRIPTION.  SHE SAID SHE CANNOT AFFORD THAT MUCH MONEY FOR ONE MEDICATION.  SHE SAID SHE NEEDED SOMETHING LESS EXPENSIVE THAT WOULD BE NO MORE THAN $10-$12 A MONTH.    PLEASE CONTACT PATIENT TO ADVISE.    CALLBACK:  [phone]

## 2020-11-11 NOTE — TELEPHONE ENCOUNTER
Informed pt she would have to check for cheapest ARB with pharmacy or insurance company and let us know.

## 2020-11-11 NOTE — TELEPHONE ENCOUNTER
I sent in valsartan to her pharmacy.  If there is a cost issue, she is going to need to check with the pharmacy to see what the cheapest ARB is for her.  Our program does not give me any insights for her.

## 2020-11-11 NOTE — TELEPHONE ENCOUNTER
When I called pt to check on her to see if she needed anything, her only concern at this time was you were supposed to be sending her something cheaper than Irbesartan to the pharmacy. They are ordering from a different  and she cannot afford their price.

## 2020-11-30 DIAGNOSIS — K21.9 GASTROESOPHAGEAL REFLUX DISEASE WITHOUT ESOPHAGITIS: ICD-10-CM

## 2020-11-30 DIAGNOSIS — R11.0 NAUSEA: ICD-10-CM

## 2020-11-30 RX ORDER — ONDANSETRON 4 MG/1
TABLET, FILM COATED ORAL
Qty: 20 TABLET | Refills: 0 | Status: SHIPPED | OUTPATIENT
Start: 2020-11-30 | End: 2021-07-02

## 2020-11-30 RX ORDER — ESTRADIOL 1 MG/1
TABLET ORAL
Qty: 90 TABLET | Refills: 1 | Status: SHIPPED | OUTPATIENT
Start: 2020-11-30 | End: 2021-04-23

## 2020-11-30 RX ORDER — ALLOPURINOL 100 MG/1
TABLET ORAL
Qty: 60 TABLET | Refills: 5 | Status: SHIPPED | OUTPATIENT
Start: 2020-11-30 | End: 2021-02-01 | Stop reason: SDUPTHER

## 2020-11-30 RX ORDER — DILTIAZEM HYDROCHLORIDE 120 MG/1
TABLET, FILM COATED ORAL
Qty: 360 TABLET | Refills: 3 | Status: SHIPPED | OUTPATIENT
Start: 2020-11-30 | End: 2021-01-08 | Stop reason: SDUPTHER

## 2020-12-03 ENCOUNTER — TELEPHONE (OUTPATIENT)
Dept: FAMILY MEDICINE CLINIC | Facility: CLINIC | Age: 70
End: 2020-12-03

## 2020-12-03 DIAGNOSIS — R80.9 TYPE 2 DIABETES MELLITUS WITH MICROALBUMINURIA, WITHOUT LONG-TERM CURRENT USE OF INSULIN: Primary | ICD-10-CM

## 2020-12-03 DIAGNOSIS — E11.29 TYPE 2 DIABETES MELLITUS WITH MICROALBUMINURIA, WITHOUT LONG-TERM CURRENT USE OF INSULIN: Primary | ICD-10-CM

## 2020-12-03 RX ORDER — BLOOD-GLUCOSE METER
1 KIT MISCELLANEOUS
Qty: 1 EACH | Refills: 0 | Status: SHIPPED | OUTPATIENT
Start: 2020-12-03

## 2020-12-03 NOTE — TELEPHONE ENCOUNTER
I printed copy of med list for pt and also had to hand write out the Rx for the supplies because directions were not stated for each individual item. Placed on your desk for you to sign.

## 2020-12-03 NOTE — TELEPHONE ENCOUNTER
You can send to her medication list.  I would encourage her, however, to enroll in [facility] that would provide her her medication list whenever she wants along with a lot of other functionality.    I sent a prescription for the glucometer, test strips and lancets to the printer [id number].

## 2020-12-03 NOTE — TELEPHONE ENCOUNTER
PATIENT NEEDS MEDICATION LIST AND PRESCRIPTION FOR DIABETIC SUPPLIES, MONITOR, LANCETS, STRIPS MAILED TO HER HOME PLEASE.     CALL [phone] IF NEEDED

## 2021-01-08 ENCOUNTER — TELEPHONE (OUTPATIENT)
Dept: FAMILY MEDICINE CLINIC | Facility: CLINIC | Age: 71
End: 2021-01-08

## 2021-01-08 RX ORDER — DILTIAZEM HYDROCHLORIDE 120 MG/1
240 TABLET, FILM COATED ORAL 2 TIMES DAILY
Qty: 360 TABLET | Refills: 3 | Status: SHIPPED | OUTPATIENT
Start: 2021-01-08 | End: 2021-02-01 | Stop reason: SDUPTHER

## 2021-01-08 NOTE — TELEPHONE ENCOUNTER
Caller: Taina Moreno    Relationship: Self    Best call back number:  [phone]    Medication needed:   Requested Prescriptions     Pending Prescriptions Disp Refills   • metFORMIN (GLUCOPHAGE) 500 MG tablet 360 tablet 1     Sig: Take 2 tablets by mouth 2 (Two) Times a Day.   • dilTIAZem (CARDIZEM) 120 MG tablet 360 tablet 3     Sig: Take 2 tablets by mouth 2 (Two) Times a Day.     What details did the patient provide when requesting the medication:  PATIENT STATES HER INSURANCE IS REQUESTING A PRINT OUT FROM HER DOCTOR BE SENT TO [facility]  SHE TOOK A COPY OF THE LIST TO THE PHARMACY BUT HER INSURANCE IS REQUESTING IT COMES FROM HER PHYSICIAN  IN A PRESCRIPTION FORM     Does the patient have less than a 3 day supply:  [] Yes  [x] No    What is the patient's preferred pharmacy: [id number] - [place], [place] - [address] [place] [address] 42 - [phone] [facility] [phone] FX

## 2021-01-30 RX ORDER — VALSARTAN 160 MG/1
TABLET ORAL
COMMUNITY
Start: 2020-11-11 | End: 2021-11-16

## 2021-01-30 RX ORDER — TAMSULOSIN HYDROCHLORIDE 0.4 MG/1
CAPSULE ORAL
COMMUNITY
Start: 2020-11-05 | End: 2021-02-01

## 2021-02-01 ENCOUNTER — OFFICE VISIT (OUTPATIENT)
Dept: FAMILY MEDICINE CLINIC | Facility: CLINIC | Age: 71
End: 2021-02-01

## 2021-02-01 VITALS
WEIGHT: 176 LBS | TEMPERATURE: 98 F | DIASTOLIC BLOOD PRESSURE: 80 MMHG | BODY MASS INDEX: 29.32 KG/M2 | SYSTOLIC BLOOD PRESSURE: 130 MMHG | RESPIRATION RATE: 16 BRPM | HEIGHT: 65 IN | HEART RATE: 72 BPM

## 2021-02-01 DIAGNOSIS — R10.9 LEFT FLANK PAIN: ICD-10-CM

## 2021-02-01 DIAGNOSIS — J30.9 ALLERGIC RHINITIS, UNSPECIFIED SEASONALITY, UNSPECIFIED TRIGGER: ICD-10-CM

## 2021-02-01 DIAGNOSIS — R80.9 TYPE 2 DIABETES MELLITUS WITH MICROALBUMINURIA, WITHOUT LONG-TERM CURRENT USE OF INSULIN (HCC): Primary | ICD-10-CM

## 2021-02-01 DIAGNOSIS — F51.01 PRIMARY INSOMNIA: ICD-10-CM

## 2021-02-01 DIAGNOSIS — H69.83 DYSFUNCTION OF BOTH EUSTACHIAN TUBES: ICD-10-CM

## 2021-02-01 DIAGNOSIS — E53.8 COBALAMIN DEFICIENCY: ICD-10-CM

## 2021-02-01 DIAGNOSIS — R74.8 ABNORMAL LIVER ENZYMES: ICD-10-CM

## 2021-02-01 DIAGNOSIS — Z71.89 ADVANCED CARE PLANNING/COUNSELING DISCUSSION: ICD-10-CM

## 2021-02-01 DIAGNOSIS — M10.00 IDIOPATHIC GOUT, UNSPECIFIED CHRONICITY, UNSPECIFIED SITE: ICD-10-CM

## 2021-02-01 DIAGNOSIS — K21.9 GASTROESOPHAGEAL REFLUX DISEASE WITHOUT ESOPHAGITIS: ICD-10-CM

## 2021-02-01 DIAGNOSIS — E11.42 DIABETIC POLYNEUROPATHY ASSOCIATED WITH TYPE 2 DIABETES MELLITUS (HCC): ICD-10-CM

## 2021-02-01 DIAGNOSIS — L20.9 ATOPIC DERMATITIS, UNSPECIFIED TYPE: ICD-10-CM

## 2021-02-01 DIAGNOSIS — J01.00 ACUTE NON-RECURRENT MAXILLARY SINUSITIS: ICD-10-CM

## 2021-02-01 DIAGNOSIS — I10 ESSENTIAL HYPERTENSION: ICD-10-CM

## 2021-02-01 DIAGNOSIS — E11.29 TYPE 2 DIABETES MELLITUS WITH MICROALBUMINURIA, WITHOUT LONG-TERM CURRENT USE OF INSULIN (HCC): Primary | ICD-10-CM

## 2021-02-01 DIAGNOSIS — E78.2 MIXED HYPERLIPIDEMIA: ICD-10-CM

## 2021-02-01 PROCEDURE — 99215 OFFICE O/P EST HI 40 MIN: CPT | Performed by: FAMILY MEDICINE

## 2021-02-01 RX ORDER — DILTIAZEM HYDROCHLORIDE 120 MG/1
120 TABLET, FILM COATED ORAL 2 TIMES DAILY
Qty: 180 TABLET | Refills: 3 | Status: SHIPPED | OUTPATIENT
Start: 2021-02-01 | End: 2022-01-24

## 2021-02-01 RX ORDER — ALLOPURINOL 100 MG/1
200 TABLET ORAL DAILY
Qty: 180 TABLET | Refills: 3 | Status: SHIPPED | OUTPATIENT
Start: 2021-02-01 | End: 2022-03-30

## 2021-02-01 RX ORDER — TRIAMCINOLONE ACETONIDE 0.25 MG/G
OINTMENT TOPICAL 2 TIMES DAILY PRN
Qty: 80 G | Refills: 1 | Status: SHIPPED | OUTPATIENT
Start: 2021-02-01 | End: 2022-04-07

## 2021-02-01 RX ORDER — BLOOD SUGAR DIAGNOSTIC
STRIP MISCELLANEOUS DAILY
COMMUNITY
Start: 2021-01-07

## 2021-02-01 RX ORDER — LANCETS 33 GAUGE
EACH MISCELLANEOUS DAILY
COMMUNITY
Start: 2021-01-07

## 2021-02-01 RX ORDER — MONTELUKAST SODIUM 10 MG/1
10 TABLET ORAL NIGHTLY
Qty: 90 TABLET | Refills: 1 | Status: SHIPPED | OUTPATIENT
Start: 2021-02-01 | End: 2022-08-12 | Stop reason: SDUPTHER

## 2021-02-01 RX ORDER — AZITHROMYCIN 250 MG/1
TABLET, FILM COATED ORAL
Qty: 6 TABLET | Refills: 0 | Status: SHIPPED | OUTPATIENT
Start: 2021-02-01 | End: 2021-08-12

## 2021-02-01 RX ORDER — TORSEMIDE 5 MG/1
5 TABLET ORAL DAILY
Qty: 90 TABLET | Refills: 1 | Status: SHIPPED | OUTPATIENT
Start: 2021-02-01 | End: 2021-02-18 | Stop reason: SDUPTHER

## 2021-02-01 RX ORDER — TRAZODONE HYDROCHLORIDE 50 MG/1
TABLET ORAL
Qty: 60 TABLET | Refills: 3 | Status: SHIPPED | OUTPATIENT
Start: 2021-02-01

## 2021-02-01 RX ORDER — FAMOTIDINE 40 MG/1
40 TABLET, FILM COATED ORAL DAILY
Qty: 90 TABLET | Refills: 3 | Status: SHIPPED | OUTPATIENT
Start: 2021-02-01 | End: 2021-10-28

## 2021-02-01 RX ORDER — FLUTICASONE PROPIONATE 50 MCG
2 SPRAY, SUSPENSION (ML) NASAL DAILY
Qty: 15.8 ML | Refills: 5 | Status: SHIPPED | OUTPATIENT
Start: 2021-02-01

## 2021-02-01 RX ORDER — MONTELUKAST SODIUM 10 MG/1
10 TABLET ORAL NIGHTLY
Qty: 90 TABLET | Refills: 1 | Status: SHIPPED | OUTPATIENT
Start: 2021-02-01 | End: 2021-02-01 | Stop reason: SDUPTHER

## 2021-02-01 RX ORDER — GLIMEPIRIDE 1 MG/1
2 TABLET ORAL
Qty: 90 TABLET | Refills: 3 | Status: SHIPPED | OUTPATIENT
Start: 2021-02-01 | End: 2021-04-07

## 2021-02-01 NOTE — PATIENT INSTRUCTIONS
Advance Care Planning and Advance Directives     You make decisions on a daily basis - decisions about where you want to live, your career, your home, your life. Perhaps one of the most important decisions you face is your choice for future medical care. Take time to talk with your family and your healthcare team and start planning today.  Advance Care Planning is a process that can help you:  · Understand possible future healthcare decisions in light of your own experiences  · Reflect on those decision in light of your goals and values  · Discuss your decisions with those closest to you and the healthcare professionals that care for you  · Make a plan by creating a document that reflects your wishes    Surrogate Decision Maker  In the event of a medical emergency, which has left you unable to communicate or to make your own decisions, you would need someone to make decisions for you.  It is important to discuss your preferences for medical treatment with this person while you are in good health.     Qualities of a surrogate decision maker:  • Willing to take on this role and responsibility  • Knows what you want for future medical care  • Willing to follow your wishes even if they don't agree with them  • Able to make difficult medical decisions under stressful circumstances    Advance Directives  These are legal documents you can create that will guide your healthcare team and decision maker(s) when needed. These documents can be stored in the electronic medical record.    · Living Will - a legal document to guide your care if you have a terminal condition or a serious illness and are unable to communicate. States vary by statute in document names/types, but most forms may include one or more of the following:        -  Directions regarding life-prolonging treatments        -  Directions regarding artificially provided nutrition/hydration        -  Choosing a healthcare decision maker        -  Direction  regarding organ/tissue donation    · Durable Power of  for Healthcare - this document names an -in-fact to make medical decisions for you, but it may also allow this person to make personal and financial decisions for you. Please seek the advice of an  if you need this type of document.    **Advance Directives are not required and no one may discriminate against you if you do not sign one.    Medical Orders  Many states allow specific forms/orders signed by your physician to record your wishes for medical treatment in your current state of health. This form, signed in personal communication with your physician, addresses resuscitation and other medical interventions that you may or may not want.      For more information or to schedule a time with a [facility] Advance Care Planning Facilitator contact: [email or web address] or call [phone] and someone will contact you directly.

## 2021-02-03 LAB
ALBUMIN SERPL-MCNC: 4.7 G/DL (ref 3.8–4.8)
ALBUMIN/GLOB SERPL: 1.9 {RATIO} (ref 1.2–2.2)
ALP SERPL-CCNC: 81 IU/L (ref 39–117)
ALT SERPL-CCNC: 38 IU/L (ref 0–32)
APO B SERPL-MCNC: 72 MG/DL
AST SERPL-CCNC: 38 IU/L (ref 0–40)
BILIRUB SERPL-MCNC: 0.8 MG/DL (ref 0–1.2)
BUN SERPL-MCNC: 7 MG/DL (ref 8–27)
BUN/CREAT SERPL: 9 (ref 12–28)
CALCIUM SERPL-MCNC: 9.4 MG/DL (ref 8.7–10.3)
CHLORIDE SERPL-SCNC: 104 MMOL/L (ref 96–106)
CHOLEST SERPL-MCNC: 141 MG/DL (ref 100–199)
CO2 SERPL-SCNC: 22 MMOL/L (ref 20–29)
CREAT SERPL-MCNC: 0.74 MG/DL (ref 0.57–1)
GLOBULIN SER CALC-MCNC: 2.5 G/DL (ref 1.5–4.5)
GLUCOSE SERPL-MCNC: 114 MG/DL (ref 65–99)
HBA1C MFR BLD: 6.5 % (ref 4.8–5.6)
HDLC SERPL-MCNC: 48 MG/DL
LDLC SERPL CALC-MCNC: 70 MG/DL (ref 0–99)
LDLC/HDLC SERPL: 1.5 RATIO (ref 0–3.2)
POTASSIUM SERPL-SCNC: 4 MMOL/L (ref 3.5–5.2)
PROT SERPL-MCNC: 7.2 G/DL (ref 6–8.5)
SODIUM SERPL-SCNC: 141 MMOL/L (ref 134–144)
TRIGL SERPL-MCNC: 133 MG/DL (ref 0–149)
VIT B12 SERPL-MCNC: 545 PG/ML (ref 232–1245)
VLDLC SERPL CALC-MCNC: 23 MG/DL (ref 5–40)

## 2021-02-08 ENCOUNTER — HOSPITAL ENCOUNTER (OUTPATIENT)
Dept: GENERAL RADIOLOGY | Facility: HOSPITAL | Age: 71
Discharge: HOME | End: 2021-02-08
Admitting: FAMILY MEDICINE

## 2021-02-08 DIAGNOSIS — R10.9 LEFT FLANK PAIN: ICD-10-CM

## 2021-02-08 PROCEDURE — 74018 RADEX ABDOMEN 1 VIEW: CPT

## 2021-02-09 ENCOUNTER — TELEPHONE (OUTPATIENT)
Dept: FAMILY MEDICINE CLINIC | Facility: CLINIC | Age: 71
End: 2021-02-09

## 2021-02-09 NOTE — TELEPHONE ENCOUNTER
Patient was advised of Instructions per MD regarding ABD/KUB and acknowledged and understood instr

## 2021-02-18 DIAGNOSIS — I10 ESSENTIAL HYPERTENSION: ICD-10-CM

## 2021-02-18 NOTE — TELEPHONE ENCOUNTER
Caller: Taina Moreno    Relationship: Self    Best call back number: [phone]       Medication needed:   Requested Prescriptions     Pending Prescriptions Disp Refills   • torsemide (DEMADEX) 5 MG tablet 90 tablet 1     Sig: Take 1 tablet by mouth Daily.       When do you need the refill by: 2/22/2021    What details did the patient provide when requesting the medication: PATIENT HAS MORE THAN A THREE DAY SUPPLY    Does the patient have less than a 3 day supply:  [] Yes  [x] No    What is the patient's preferred pharmacy: [facility] [address] [place] [address] [phone] [facility] [phone]

## 2021-02-19 RX ORDER — TORSEMIDE 5 MG/1
5 TABLET ORAL DAILY
Qty: 90 TABLET | Refills: 1 | Status: SHIPPED | OUTPATIENT
Start: 2021-02-19 | End: 2023-02-27 | Stop reason: SDUPTHER

## 2021-04-07 DIAGNOSIS — E11.29 TYPE 2 DIABETES MELLITUS WITH MICROALBUMINURIA, WITHOUT LONG-TERM CURRENT USE OF INSULIN (HCC): ICD-10-CM

## 2021-04-07 DIAGNOSIS — R80.9 TYPE 2 DIABETES MELLITUS WITH MICROALBUMINURIA, WITHOUT LONG-TERM CURRENT USE OF INSULIN (HCC): ICD-10-CM

## 2021-04-07 RX ORDER — GLIMEPIRIDE 1 MG/1
TABLET ORAL
Qty: 60 TABLET | Refills: 0 | Status: SHIPPED | OUTPATIENT
Start: 2021-04-07 | End: 2021-05-14

## 2021-04-23 RX ORDER — ESTRADIOL 1 MG/1
TABLET ORAL
Qty: 90 TABLET | Refills: 0 | Status: SHIPPED | OUTPATIENT
Start: 2021-04-23 | End: 2021-09-20

## 2021-04-23 NOTE — TELEPHONE ENCOUNTER
We need to begin working on a plan to taper her off of estrogen. It's not recommended to continue indefinitely. RF'd this time and then needs to make an OV to discuss how we'll taper.

## 2021-05-14 DIAGNOSIS — E11.29 TYPE 2 DIABETES MELLITUS WITH MICROALBUMINURIA, WITHOUT LONG-TERM CURRENT USE OF INSULIN (HCC): ICD-10-CM

## 2021-05-14 DIAGNOSIS — R80.9 TYPE 2 DIABETES MELLITUS WITH MICROALBUMINURIA, WITHOUT LONG-TERM CURRENT USE OF INSULIN (HCC): ICD-10-CM

## 2021-05-14 RX ORDER — GLIMEPIRIDE 1 MG/1
TABLET ORAL
Qty: 60 TABLET | Refills: 0 | Status: SHIPPED | OUTPATIENT
Start: 2021-05-14 | End: 2021-06-28

## 2021-06-28 DIAGNOSIS — E11.29 TYPE 2 DIABETES MELLITUS WITH MICROALBUMINURIA, WITHOUT LONG-TERM CURRENT USE OF INSULIN (HCC): ICD-10-CM

## 2021-06-28 DIAGNOSIS — R80.9 TYPE 2 DIABETES MELLITUS WITH MICROALBUMINURIA, WITHOUT LONG-TERM CURRENT USE OF INSULIN (HCC): ICD-10-CM

## 2021-06-28 RX ORDER — GLIMEPIRIDE 1 MG/1
TABLET ORAL
Qty: 60 TABLET | Refills: 0 | Status: SHIPPED | OUTPATIENT
Start: 2021-06-28 | End: 2021-08-09

## 2021-07-01 DIAGNOSIS — K21.9 GASTROESOPHAGEAL REFLUX DISEASE WITHOUT ESOPHAGITIS: ICD-10-CM

## 2021-07-01 DIAGNOSIS — R11.0 NAUSEA: ICD-10-CM

## 2021-07-02 RX ORDER — ONDANSETRON 4 MG/1
TABLET, FILM COATED ORAL
Qty: 20 TABLET | Refills: 0 | Status: SHIPPED | OUTPATIENT
Start: 2021-07-02 | End: 2021-09-07

## 2021-07-06 DIAGNOSIS — R80.9 TYPE 2 DIABETES MELLITUS WITH MICROALBUMINURIA, WITHOUT LONG-TERM CURRENT USE OF INSULIN (HCC): ICD-10-CM

## 2021-07-06 DIAGNOSIS — E11.29 TYPE 2 DIABETES MELLITUS WITH MICROALBUMINURIA, WITHOUT LONG-TERM CURRENT USE OF INSULIN (HCC): ICD-10-CM

## 2021-07-06 NOTE — TELEPHONE ENCOUNTER
Caller: Taina Moreno    Relationship: Self    Best call back number: [phone]    Medication needed:   Requested Prescriptions     Pending Prescriptions Disp Refills   • metFORMIN (GLUCOPHAGE) 500 MG tablet 360 tablet 1     Sig: Take 2 tablets by mouth 2 (Two) Times a Day.       When do you need the refill by: 7/6/2021  What additional details did the patient provide when requesting the medication: PATIENT HAS A WEEK SUPPLY LEFT    Does the patient have less than a 3 day supply:  [] Yes  [x] No    What is the patient's preferred pharmacy: [facility] [address] [place] [address] [phone] [facility] [phone]             Complex Repair And Split-Thickness Skin Graft Text: The defect edges were debeveled with a #15 scalpel blade.  The primary defect was closed partially with a complex linear closure.  Given the location of the defect, shape of the defect and the proximity to free margins a split thickness skin graft was deemed most appropriate to repair the remaining defect.  The graft was trimmed to fit the size of the remaining defect.  The graft was then placed in the primary defect, oriented appropriately, and sutured into place.

## 2021-08-07 DIAGNOSIS — R80.9 TYPE 2 DIABETES MELLITUS WITH MICROALBUMINURIA, WITHOUT LONG-TERM CURRENT USE OF INSULIN (HCC): ICD-10-CM

## 2021-08-07 DIAGNOSIS — E11.29 TYPE 2 DIABETES MELLITUS WITH MICROALBUMINURIA, WITHOUT LONG-TERM CURRENT USE OF INSULIN (HCC): ICD-10-CM

## 2021-08-09 RX ORDER — GLIMEPIRIDE 1 MG/1
TABLET ORAL
Qty: 60 TABLET | Refills: 0 | Status: SHIPPED | OUTPATIENT
Start: 2021-08-09 | End: 2021-09-20

## 2021-08-12 ENCOUNTER — OFFICE VISIT (OUTPATIENT)
Dept: FAMILY MEDICINE CLINIC | Facility: CLINIC | Age: 71
End: 2021-08-12

## 2021-08-12 VITALS
DIASTOLIC BLOOD PRESSURE: 68 MMHG | WEIGHT: 179 LBS | HEART RATE: 70 BPM | TEMPERATURE: 97.9 F | HEIGHT: 65 IN | SYSTOLIC BLOOD PRESSURE: 122 MMHG | BODY MASS INDEX: 29.82 KG/M2 | RESPIRATION RATE: 18 BRPM | OXYGEN SATURATION: 98 %

## 2021-08-12 DIAGNOSIS — M25.512 ACUTE PAIN OF LEFT SHOULDER: ICD-10-CM

## 2021-08-12 DIAGNOSIS — F41.9 ANXIETY: ICD-10-CM

## 2021-08-12 DIAGNOSIS — M54.50 ACUTE LEFT-SIDED LOW BACK PAIN WITHOUT SCIATICA: Primary | ICD-10-CM

## 2021-08-12 LAB
BILIRUB BLD-MCNC: NEGATIVE MG/DL
CLARITY, POC: CLEAR
COLOR UR: YELLOW
GLUCOSE UR STRIP-MCNC: NEGATIVE MG/DL
KETONES UR QL: NEGATIVE
LEUKOCYTE EST, POC: NEGATIVE
NITRITE UR-MCNC: NEGATIVE MG/ML
PH UR: 6 [PH] (ref 5–8)
PROT UR STRIP-MCNC: NEGATIVE MG/DL
RBC # UR STRIP: NEGATIVE /UL
SP GR UR: 1.02 (ref 1–1.03)
UROBILINOGEN UR QL: NORMAL

## 2021-08-12 PROCEDURE — 99214 OFFICE O/P EST MOD 30 MIN: CPT | Performed by: FAMILY MEDICINE

## 2021-08-12 PROCEDURE — 81002 URINALYSIS NONAUTO W/O SCOPE: CPT | Performed by: FAMILY MEDICINE

## 2021-08-12 PROCEDURE — 96372 THER/PROPH/DIAG INJ SC/IM: CPT | Performed by: FAMILY MEDICINE

## 2021-08-12 RX ORDER — LOSARTAN POTASSIUM 100 MG/1
TABLET ORAL
COMMUNITY
Start: 2021-08-07 | End: 2021-08-12

## 2021-08-12 RX ORDER — METHYLPREDNISOLONE 4 MG/1
TABLET ORAL
Qty: 21 EACH | Refills: 0 | Status: SHIPPED | OUTPATIENT
Start: 2021-08-12 | End: 2021-09-17

## 2021-08-12 RX ORDER — TIZANIDINE 4 MG/1
TABLET ORAL
Qty: 60 TABLET | Refills: 3 | Status: CANCELLED | OUTPATIENT
Start: 2021-08-12

## 2021-08-12 RX ORDER — LORAZEPAM 0.5 MG/1
0.5 TABLET ORAL EVERY 8 HOURS PRN
Qty: 15 TABLET | Refills: 0 | Status: SHIPPED | OUTPATIENT
Start: 2021-08-12

## 2021-08-12 RX ORDER — TRIAMCINOLONE ACETONIDE 40 MG/ML
40 INJECTION, SUSPENSION INTRA-ARTICULAR; INTRAMUSCULAR ONCE
Status: COMPLETED | OUTPATIENT
Start: 2021-08-12 | End: 2021-08-12

## 2021-08-12 RX ORDER — TIZANIDINE 4 MG/1
4 TABLET ORAL NIGHTLY PRN
COMMUNITY
End: 2021-09-17 | Stop reason: SDUPTHER

## 2021-08-12 RX ADMIN — TRIAMCINOLONE ACETONIDE 40 MG: 40 INJECTION, SUSPENSION INTRA-ARTICULAR; INTRAMUSCULAR at 12:48

## 2021-08-12 NOTE — PROGRESS NOTES
"Chief Complaint  L hip pain down leg (requested urine be checked due to hx of kidney stones ) and L shoulder pain    Subjective          Taina Moreno presents to [facility]  History of Present Illness  Left hip pain that radiates down anterior thigh and knee x 1 month  Using a heating pad to treat, also taking ibuprofen prn.   Numbness present in left leg  She has tizanidine prescription, but not using because it makes her drowsy.     Left shoulder pain started yesterday   No injury  Certain movements causes pain  Thinks this is the shoulder that she tore rotator cuff in years ago     She is under more stress and experiencing more anxiety.  Her son and his children are currently residing with her.  She is requesting a temporary treatment to help calm her nerves.  In the past, she has taken lorazepam and done well with this treatment.    The following portions of the patient's history were reviewed and updated as appropriate: allergies, current medications, past family history, past medical history, past social history, past surgical history and problem list.    Objective   Vital Signs:   /68   Pulse 70   Temp 97.9 °F (36.6 °C)   Resp 18   Ht 165.1 cm (65\")   Wt 81.2 kg (179 lb)   SpO2 98%   BMI 29.79 kg/m²     Physical Exam  Vitals and nursing note reviewed.   Constitutional:       Appearance: Normal appearance. She is well-developed.   HENT:      Head: Normocephalic and atraumatic.      Right Ear: External ear normal.      Left Ear: External ear normal.      Nose: Nose normal.   Eyes:      Conjunctiva/sclera: Conjunctivae normal.   Cardiovascular:      Rate and Rhythm: Normal rate and regular rhythm.      Heart sounds: Normal heart sounds.   Pulmonary:      Effort: Pulmonary effort is normal.      Breath sounds: Normal breath sounds.   Musculoskeletal:         General: No deformity.      Left shoulder: Tenderness present.      Cervical back: Neck supple.      " Lumbar back: Spasms and tenderness (left paraspinal) present. Decreased range of motion.   Skin:     General: Skin is warm and dry.   Neurological:      Mental Status: She is alert and oriented to person, place, and time.   Psychiatric:         Mood and Affect: Mood is anxious.         Behavior: Behavior normal.        Result Review :                 Assessment and Plan    Diagnoses and all orders for this visit:    1. Acute left-sided low back pain without sciatica (Primary)  -     POC Urinalysis Dipstick  -     triamcinolone acetonide (KENALOG-40) injection 40 mg  -     methylPREDNISolone (MEDROL) 4 MG dose pack; Take as directed on package instructions.  Dispense: 21 each; Refill: 0    2. Acute pain of left shoulder  -     triamcinolone acetonide (KENALOG-40) injection 40 mg  -     methylPREDNISolone (MEDROL) 4 MG dose pack; Take as directed on package instructions.  Dispense: 21 each; Refill: 0    3. Anxiety  -     LORazepam (Ativan) 0.5 MG tablet; Take 1 tablet by mouth Every 8 (Eight) Hours As Needed for Anxiety.  Dispense: 15 tablet; Refill: 0      Kenalog IM provided today, start steroid taper tomorrow to treat acute back pain and left shoulder pain.  At home exercises for both back and shoulder provided to her.  Alternate warm and cold compresses on her back and cold compresses on left shoulder.  Consider physical therapy for additional treatment.  She has a current prescription for tizanidine, use as directed.  Temporary supply of lorazepam to treat anxiety.  [facility] report reviewed      Follow Up   Return if symptoms worsen or fail to improve.  Patient was given instructions and counseling regarding her condition or for health maintenance advice. Please see specific information pulled into the AVS if appropriate.

## 2021-08-12 NOTE — PATIENT INSTRUCTIONS
Go to the nearest ER or return to clinic if symptoms worsen, fever/chill develop    Back Exercises  These exercises help to make your trunk and back strong. They also help to keep the lower back flexible. Doing these exercises can help to prevent back pain or lessen existing pain.  · If you have back pain, try to do these exercises 2-3 times each day or as told by your doctor.  · As you get better, do the exercises once each day. Repeat the exercises more often as told by your doctor.  · To stop back pain from coming back, do the exercises once each day, or as told by your doctor.  Exercises  Single knee to chest  Do these steps 3-5 times in a row for each le. Lie on your back on a firm bed or the floor with your legs stretched out.  2. Bring one knee to your chest.  3. Grab your knee or thigh with both hands and hold them it in place.  4. Pull on your knee until you feel a gentle stretch in your lower back or buttocks.  5. Keep doing the stretch for 10-30 seconds.  6. Slowly let go of your leg and straighten it.  Pelvic tilt  Do these steps 5-10 times in a row:  1. Lie on your back on a firm bed or the floor with your legs stretched out.  2. Bend your knees so they point up to the ceiling. Your feet should be flat on the floor.  3. Tighten your lower belly (abdomen) muscles to press your lower back against the floor. This will make your tailbone point up to the ceiling instead of pointing down to your feet or the floor.  4. Stay in this position for 5-10 seconds while you gently tighten your muscles and breathe evenly.  Cat-cow  Do these steps until your lower back bends more easily:  1. Get on your hands and knees on a firm surface. Keep your hands under your shoulders, and keep your knees under your hips. You may put padding under your knees.  2. Let your head hang down toward your chest. Tighten (contract) the muscles in your belly. Point your tailbone toward the floor so your lower back becomes rounded  like the back of a cat.  3. Stay in this position for 5 seconds.  4. Slowly lift your head. Let the muscles of your belly relax. Point your tailbone up toward the ceiling so your back forms a sagging arch like the back of a cow.  5. Stay in this position for 5 seconds.    Press-ups  Do these steps 5-10 times in a row:  1. Lie on your belly (face-down) on the floor.  2. Place your hands near your head, about shoulder-width apart.  3. While you keep your back relaxed and keep your hips on the floor, slowly straighten your arms to raise the top half of your body and lift your shoulders. Do not use your back muscles. You may change where you place your hands in order to make yourself more comfortable.  4. Stay in this position for 5 seconds.  5. Slowly return to lying flat on the floor.    Bridges  Do these steps 10 times in a row:  1. Lie on your back on a firm surface.  2. Bend your knees so they point up to the ceiling. Your feet should be flat on the floor. Your arms should be flat at your sides, next to your body.  3. Tighten your butt muscles and lift your butt off the floor until your waist is almost as high as your knees. If you do not feel the muscles working in your butt and the back of your thighs, slide your feet 1-2 inches farther away from your butt.  4. Stay in this position for 3-5 seconds.  5. Slowly lower your butt to the floor, and let your butt muscles relax.  If this exercise is too easy, try doing it with your arms crossed over your chest.  Belly crunches  Do these steps 5-10 times in a row:  1. Lie on your back on a firm bed or the floor with your legs stretched out.  2. Bend your knees so they point up to the ceiling. Your feet should be flat on the floor.  3. Cross your arms over your chest.  4. Tip your chin a little bit toward your chest but do not bend your neck.  5. Tighten your belly muscles and slowly raise your chest just enough to lift your shoulder blades a tiny bit off of the floor.  Avoid raising your body higher than that, because it can put too much stress on your low back.  6. Slowly lower your chest and your head to the floor.  Back lifts  Do these steps 5-10 times in a row:  1. Lie on your belly (face-down) with your arms at your sides, and rest your forehead on the floor.  2. Tighten the muscles in your legs and your butt.  3. Slowly lift your chest off of the floor while you keep your hips on the floor. Keep the back of your head in line with the curve in your back. Look at the floor while you do this.  4. Stay in this position for 3-5 seconds.  5. Slowly lower your chest and your face to the floor.  Contact a doctor if:  · Your back pain gets a lot worse when you do an exercise.  · Your back pain does not get better 2 hours after you exercise.  If you have any of these problems, stop doing the exercises. Do not do them again unless your doctor says it is okay.  Get help right away if:  · You have sudden, very bad back pain. If this happens, stop doing the exercises. Do not do them again unless your doctor says it is okay.  This information is not intended to replace advice given to you by your health care provider. Make sure you discuss any questions you have with your health care provider.  Document Revised: 09/12/2019 Document Reviewed: 09/12/2019  [employer] Patient Education © 2021 [employer]    Shoulder Exercises  Ask your health care provider which exercises are safe for you. Do exercises exactly as told by your health care provider and adjust them as directed. It is normal to feel mild stretching, pulling, tightness, or discomfort as you do these exercises. Stop right away if you feel sudden pain or your pain gets worse. Do not begin these exercises until told by your health care provider.  Stretching exercises  External rotation and abduction  This exercise is sometimes called corner stretch. This exercise rotates your arm outward (external rotation) and moves your arm out from  your body (abduction).  1.  a doorway with one of your feet slightly in front of the other. This is called a staggered stance. If you cannot reach your forearms to the door frame, stand facing a corner of a room.  2. Choose one of the following positions as told by your health care provider:  ? Place your hands and forearms on the door frame above your head.  ? Place your hands and forearms on the door frame at the height of your head.  ? Place your hands on the door frame at the height of your elbows.  3. Slowly move your weight onto your front foot until you feel a stretch across your chest and in the front of your shoulders. Keep your head and chest upright and keep your abdominal muscles tight.  4. Hold for __________ seconds.  5. To release the stretch, shift your weight to your back foot.  Repeat __________ times. Complete this exercise __________ times a day.  Extension, standing  7. Stand and hold a broomstick, a cane, or a similar object behind your back.  ? Your hands should be a little wider than shoulder width apart.  ? Your palms should face away from your back.  8. Keeping your elbows straight and your shoulder muscles relaxed, move the stick away from your body until you feel a stretch in your shoulders (extension).  ? Avoid shrugging your shoulders while you move the stick. Keep your shoulder blades tucked down toward the middle of your back.  9. Hold for __________ seconds.  10. Slowly return to the starting position.  Repeat __________ times. Complete this exercise __________ times a day.  Range-of-motion exercises  Pendulum    5. Stand near a wall or a surface that you can hold onto for balance.  6. Bend at the waist and let your left / right arm hang straight down. Use your other arm to support you. Keep your back straight and do not lock your knees.  7. Relax your left / right arm and shoulder muscles, and move your hips and your trunk so your left / right arm swings freely. Your arm  should swing because of the motion of your body, not because you are using your arm or shoulder muscles.  8. Keep moving your hips and trunk so your arm swings in the following directions, as told by your health care provider:  ? Side to side.  ? Forward and backward.  ? In clockwise and counterclockwise circles.  9. Continue each motion for __________ seconds, or for as long as told by your health care provider.  10. Slowly return to the starting position.  Repeat __________ times. Complete this exercise __________ times a day.  Shoulder flexion, standing    6. Stand and hold a broomstick, a cane, or a similar object. Place your hands a little more than shoulder width apart on the object. Your left / right hand should be palm up, and your other hand should be palm down.  7. Keep your elbow straight and your shoulder muscles relaxed. Push the stick up with your healthy arm to raise your left / right arm in front of your body, and then over your head until you feel a stretch in your shoulder (flexion).  ? Avoid shrugging your shoulder while you raise your arm. Keep your shoulder blade tucked down toward the middle of your back.  8. Hold for __________ seconds.  9. Slowly return to the starting position.  Repeat __________ times. Complete this exercise __________ times a day.  Shoulder abduction, standing  6. Stand and hold a broomstick, a cane, or a similar object. Place your hands a little more than shoulder width apart on the object. Your left / right hand should be palm up, and your other hand should be palm down.  7. Keep your elbow straight and your shoulder muscles relaxed. Push the object across your body toward your left / right side. Raise your left / right arm to the side of your body (abduction) until you feel a stretch in your shoulder.  ? Do not raise your arm above shoulder height unless your health care provider tells you to do that.  ? If directed, raise your arm over your head.  ? Avoid shrugging  your shoulder while you raise your arm. Keep your shoulder blade tucked down toward the middle of your back.  8. Hold for __________ seconds.  9. Slowly return to the starting position.  Repeat __________ times. Complete this exercise __________ times a day.  Internal rotation    6. Place your left / right hand behind your back, palm up.  7. Use your other hand to dangle an exercise band, a towel, or a similar object over your shoulder. Grasp the band with your left / right hand so you are holding on to both ends.  8. Gently pull up on the band until you feel a stretch in the front of your left / right shoulder. The movement of your arm toward the center of your body is called internal rotation.  ? Avoid shrugging your shoulder while you raise your arm. Keep your shoulder blade tucked down toward the middle of your back.  9. Hold for __________ seconds.  10. Release the stretch by letting go of the band and lowering your hands.  Repeat __________ times. Complete this exercise __________ times a day.  Strengthening exercises  External rotation    7. Sit in a stable chair without armrests.  8. Secure an exercise band to a stable object at elbow height on your left / right side.  9. Place a soft object, such as a folded towel or a small pillow, between your left / right upper arm and your body to move your elbow about 4 inches (10 cm) away from your side.  10. Hold the end of the exercise band so it is tight and there is no slack.  11. Keeping your elbow pressed against the soft object, slowly move your forearm out, away from your abdomen (external rotation). Keep your body steady so only your forearm moves.  12. Hold for __________ seconds.  13. Slowly return to the starting position.  Repeat __________ times. Complete this exercise __________ times a day.  Shoulder abduction    6. Sit in a stable chair without armrests, or stand up.  7. Hold a __________ weight in your left / right hand, or hold an exercise band with  both hands.  8. Start with your arms straight down and your left / right palm facing in, toward your body.  9. Slowly lift your left / right hand out to your side (abduction). Do not lift your hand above shoulder height unless your health care provider tells you that this is safe.  ? Keep your arms straight.  ? Avoid shrugging your shoulder while you do this movement. Keep your shoulder blade tucked down toward the middle of your back.  10. Hold for __________ seconds.  11. Slowly lower your arm, and return to the starting position.  Repeat __________ times. Complete this exercise __________ times a day.  Shoulder extension  1. Sit in a stable chair without armrests, or stand up.  2. Secure an exercise band to a stable object in front of you so it is at shoulder height.  3. Hold one end of the exercise band in each hand. Your palms should face each other.  4. Straighten your elbows and lift your hands up to shoulder height.  5. Step back, away from the secured end of the exercise band, until the band is tight and there is no slack.  6. Squeeze your shoulder blades together as you pull your hands down to the sides of your thighs (extension). Stop when your hands are straight down by your sides. Do not let your hands go behind your body.  7. Hold for __________ seconds.  8. Slowly return to the starting position.  Repeat __________ times. Complete this exercise __________ times a day.  Shoulder row  1. Sit in a stable chair without armrests, or stand up.  2. Secure an exercise band to a stable object in front of you so it is at waist height.  3. Hold one end of the exercise band in each hand. Position your palms so that your thumbs are facing the ceiling (neutral position).  4. Bend each of your elbows to a 90-degree angle (right angle) and keep your upper arms at your sides.  5. Step back until the band is tight and there is no slack.  6. Slowly pull your elbows back behind you.  7. Hold for __________  seconds.  8. Slowly return to the starting position.  Repeat __________ times. Complete this exercise __________ times a day.  Shoulder press-ups    1. Sit in a stable chair that has armrests. Sit upright, with your feet flat on the floor.  2. Put your hands on the armrests so your elbows are bent and your fingers are pointing forward. Your hands should be about even with the sides of your body.  3. Push down on the armrests and use your arms to lift yourself off the chair. Straighten your elbows and lift yourself up as much as you comfortably can.  ? Move your shoulder blades down, and avoid letting your shoulders move up toward your ears.  ? Keep your feet on the ground. As you get stronger, your feet should support less of your body weight as you lift yourself up.  4. Hold for __________ seconds.  5. Slowly lower yourself back into the chair.  Repeat __________ times. Complete this exercise __________ times a day.  Wall push-ups    1. Stand so you are facing a stable wall. Your feet should be about one arm-length away from the wall.  2. Lean forward and place your palms on the wall at shoulder height.  3. Keep your feet flat on the floor as you bend your elbows and lean forward toward the wall.  4. Hold for __________ seconds.  5. Straighten your elbows to push yourself back to the starting position.  Repeat __________ times. Complete this exercise __________ times a day.  This information is not intended to replace advice given to you by your health care provider. Make sure you discuss any questions you have with your health care provider.  Document Revised: 04/10/2020 Document Reviewed: 01/17/2020  [employer] Patient Education © 2021 [employer]

## 2021-09-06 ENCOUNTER — TELEPHONE (OUTPATIENT)
Dept: FAMILY MEDICINE CLINIC | Facility: CLINIC | Age: 71
End: 2021-09-06

## 2021-09-06 DIAGNOSIS — K21.9 GASTROESOPHAGEAL REFLUX DISEASE WITHOUT ESOPHAGITIS: ICD-10-CM

## 2021-09-06 DIAGNOSIS — R11.0 NAUSEA: ICD-10-CM

## 2021-09-07 RX ORDER — TRAMADOL HYDROCHLORIDE 50 MG/1
TABLET ORAL
Qty: 14 TABLET | Refills: 0 | OUTPATIENT
Start: 2021-09-07

## 2021-09-07 RX ORDER — ONDANSETRON 4 MG/1
TABLET, FILM COATED ORAL
Qty: 20 TABLET | Refills: 0 | Status: SHIPPED | OUTPATIENT
Start: 2021-09-07 | End: 2022-01-28

## 2021-09-07 NOTE — TELEPHONE ENCOUNTER
Please call.  I refilled the Zofran but I have not seen her and she has not had the tramadol in quite some time and not able to refill that without follow-up.

## 2021-09-17 ENCOUNTER — OFFICE VISIT (OUTPATIENT)
Dept: FAMILY MEDICINE CLINIC | Facility: CLINIC | Age: 71
End: 2021-09-17

## 2021-09-17 VITALS
DIASTOLIC BLOOD PRESSURE: 82 MMHG | HEIGHT: 65 IN | WEIGHT: 180 LBS | RESPIRATION RATE: 18 BRPM | TEMPERATURE: 98.1 F | HEART RATE: 68 BPM | BODY MASS INDEX: 29.99 KG/M2 | SYSTOLIC BLOOD PRESSURE: 124 MMHG | OXYGEN SATURATION: 98 %

## 2021-09-17 DIAGNOSIS — M62.838 MUSCLE SPASM: Primary | ICD-10-CM

## 2021-09-17 DIAGNOSIS — R80.9 TYPE 2 DIABETES MELLITUS WITH MICROALBUMINURIA, WITHOUT LONG-TERM CURRENT USE OF INSULIN (HCC): ICD-10-CM

## 2021-09-17 DIAGNOSIS — I10 ESSENTIAL HYPERTENSION: ICD-10-CM

## 2021-09-17 DIAGNOSIS — E11.29 TYPE 2 DIABETES MELLITUS WITH MICROALBUMINURIA, WITHOUT LONG-TERM CURRENT USE OF INSULIN (HCC): ICD-10-CM

## 2021-09-17 PROCEDURE — 99214 OFFICE O/P EST MOD 30 MIN: CPT | Performed by: FAMILY MEDICINE

## 2021-09-17 RX ORDER — TIZANIDINE 4 MG/1
4 TABLET ORAL NIGHTLY PRN
Qty: 30 TABLET | Refills: 1 | Status: SHIPPED | OUTPATIENT
Start: 2021-09-17

## 2021-09-17 RX ORDER — TRAMADOL HYDROCHLORIDE 50 MG/1
50 TABLET ORAL EVERY 8 HOURS PRN
Qty: 20 TABLET | Refills: 0 | Status: SHIPPED | OUTPATIENT
Start: 2021-09-17

## 2021-09-17 NOTE — PROGRESS NOTES
Subjective   Taina Moreno is a 71 y.o. female.     History of Present Illness     She complains of muscle cramps all through her body  Head, face, legs, feet  She has been eating more bananas without help  In the past she had been on magnesium and potassium but has not taken them for several years  She would likle tizanidine and tramadol for this    She has had glucose levels that have been higher the past bit with eating more bananas  She is on amaryl and metformin for her DM and last check up was 2/2021    She complains of sonme sinus congestion as well    The following portions of the patient's history were reviewed and updated as appropriate: allergies, current medications, past family history, past medical history, past social history, past surgical history and problem list.    Review of Systems   Constitutional: Negative.    Musculoskeletal:        Hpi       Objective   Physical Exam  Vitals and nursing note reviewed.   Constitutional:       General: She is not in acute distress.     Appearance: Normal appearance. She is well-developed.   Cardiovascular:      Rate and Rhythm: Normal rate and regular rhythm.      Heart sounds: Normal heart sounds.   Pulmonary:      Effort: Pulmonary effort is normal.      Breath sounds: Normal breath sounds.   Neurological:      Mental Status: She is alert and oriented to person, place, and time.   Psychiatric:         Mood and Affect: Mood normal.         Behavior: Behavior normal.         Thought Content: Thought content normal.         Judgment: Judgment normal.         Assessment/Plan   Diagnoses and all orders for this visit:    1. Muscle spasm (Primary)  -     CBC & Differential  -     Comprehensive Metabolic Panel  -     Magnesium  -     tiZANidine (ZANAFLEX) 4 MG tablet; Take 1 tablet by mouth At Night As Needed for Muscle Spasms.  Dispense: 30 tablet; Refill: 1  -     traMADol (ULTRAM) 50 MG tablet; Take 1 tablet by mouth Every 8 (Eight) Hours As Needed for  Moderate Pain .  Dispense: 20 tablet; Refill: 0    2. Type 2 diabetes mellitus with microalbuminuria, without long-term current use of insulin (CMS/[facility])  -     Comprehensive Metabolic Panel  -     Hemoglobin A1c    3. Essential hypertension  -     CBC & Differential    Other orders  -     Chlorcyclizine-Pseudoephed 25-60 MG tablet; 1/2-1 po q 8 hours PRN  Dispense: 30 tablet; Refill: 1    ok for PRN tramadol and tizanidine.  Will check labs for muscle cramps and f/u pending rersults  Recheck DM labs and then will decide if any changes needed.  rec f/u in 3 months

## 2021-09-18 LAB
ALBUMIN SERPL-MCNC: 4.7 G/DL (ref 3.8–4.8)
ALBUMIN/GLOB SERPL: 2 {RATIO} (ref 1.2–2.2)
ALP SERPL-CCNC: 91 IU/L (ref 44–121)
ALT SERPL-CCNC: 35 IU/L (ref 0–32)
AST SERPL-CCNC: 31 IU/L (ref 0–40)
BASOPHILS # BLD AUTO: 0.1 X10E3/UL (ref 0–0.2)
BASOPHILS NFR BLD AUTO: 1 %
BILIRUB SERPL-MCNC: 0.6 MG/DL (ref 0–1.2)
BUN SERPL-MCNC: 11 MG/DL (ref 8–27)
BUN/CREAT SERPL: 15 (ref 12–28)
CALCIUM SERPL-MCNC: 9.8 MG/DL (ref 8.7–10.3)
CHLORIDE SERPL-SCNC: 103 MMOL/L (ref 96–106)
CO2 SERPL-SCNC: 25 MMOL/L (ref 20–29)
CREAT SERPL-MCNC: 0.72 MG/DL (ref 0.57–1)
EOSINOPHIL # BLD AUTO: 0.2 X10E3/UL (ref 0–0.4)
EOSINOPHIL NFR BLD AUTO: 3 %
ERYTHROCYTE [DISTWIDTH] IN BLOOD BY AUTOMATED COUNT: 12.1 % (ref 11.7–15.4)
GLOBULIN SER CALC-MCNC: 2.3 G/DL (ref 1.5–4.5)
GLUCOSE SERPL-MCNC: 88 MG/DL (ref 65–99)
HBA1C MFR BLD: 7.4 % (ref 4.8–5.6)
HCT VFR BLD AUTO: 41.6 % (ref 34–46.6)
HGB BLD-MCNC: 14.3 G/DL (ref 11.1–15.9)
IMM GRANULOCYTES # BLD AUTO: 0 X10E3/UL (ref 0–0.1)
IMM GRANULOCYTES NFR BLD AUTO: 0 %
LYMPHOCYTES # BLD AUTO: 3.3 X10E3/UL (ref 0.7–3.1)
LYMPHOCYTES NFR BLD AUTO: 43 %
MAGNESIUM SERPL-MCNC: 1.8 MG/DL (ref 1.6–2.3)
MCH RBC QN AUTO: 32.1 PG (ref 26.6–33)
MCHC RBC AUTO-ENTMCNC: 34.4 G/DL (ref 31.5–35.7)
MCV RBC AUTO: 94 FL (ref 79–97)
MONOCYTES # BLD AUTO: 0.7 X10E3/UL (ref 0.1–0.9)
MONOCYTES NFR BLD AUTO: 9 %
NEUTROPHILS # BLD AUTO: 3.4 X10E3/UL (ref 1.4–7)
NEUTROPHILS NFR BLD AUTO: 44 %
PLATELET # BLD AUTO: 305 X10E3/UL (ref 150–450)
POTASSIUM SERPL-SCNC: 4.3 MMOL/L (ref 3.5–5.2)
PROT SERPL-MCNC: 7 G/DL (ref 6–8.5)
RBC # BLD AUTO: 4.45 X10E6/UL (ref 3.77–5.28)
SODIUM SERPL-SCNC: 141 MMOL/L (ref 134–144)
WBC # BLD AUTO: 7.7 X10E3/UL (ref 3.4–10.8)

## 2021-09-20 DIAGNOSIS — R80.9 TYPE 2 DIABETES MELLITUS WITH MICROALBUMINURIA, WITHOUT LONG-TERM CURRENT USE OF INSULIN (HCC): ICD-10-CM

## 2021-09-20 DIAGNOSIS — E11.29 TYPE 2 DIABETES MELLITUS WITH MICROALBUMINURIA, WITHOUT LONG-TERM CURRENT USE OF INSULIN (HCC): ICD-10-CM

## 2021-09-20 RX ORDER — GLIMEPIRIDE 1 MG/1
TABLET ORAL
Qty: 180 TABLET | Refills: 0 | Status: SHIPPED | OUTPATIENT
Start: 2021-09-20 | End: 2022-01-03

## 2021-09-20 RX ORDER — ESTRADIOL 1 MG/1
TABLET ORAL
Qty: 90 TABLET | Refills: 0 | Status: SHIPPED | OUTPATIENT
Start: 2021-09-20 | End: 2021-12-27

## 2021-09-20 NOTE — TELEPHONE ENCOUNTER
Rx Refill Note  Requested Prescriptions     Pending Prescriptions Disp Refills   • estradiol (ESTRACE) 1 MG tablet [Pharmacy Med Name: Estradiol 1 MG Oral Tablet] 90 tablet 0     Sig: Take 1 tablet by mouth once daily   • glimepiride (AMARYL) 1 MG tablet [Pharmacy Med Name: Glimepiride 1 MG Oral Tablet] 60 tablet 0     Sig: Take 1 tablet by mouth twice daily      Last office visit with prescribing clinician: 2/1/2021      Next office visit with prescribing clinician: Visit date not found            Ophelia Flannery MA  09/20/21, 13:46 EDT

## 2021-09-29 ENCOUNTER — TELEPHONE (OUTPATIENT)
Dept: FAMILY MEDICINE CLINIC | Facility: CLINIC | Age: 71
End: 2021-09-29

## 2021-09-29 DIAGNOSIS — E11.29 TYPE 2 DIABETES MELLITUS WITH MICROALBUMINURIA, WITHOUT LONG-TERM CURRENT USE OF INSULIN (HCC): Primary | ICD-10-CM

## 2021-09-29 DIAGNOSIS — R80.9 TYPE 2 DIABETES MELLITUS WITH MICROALBUMINURIA, WITHOUT LONG-TERM CURRENT USE OF INSULIN (HCC): Primary | ICD-10-CM

## 2021-09-29 DIAGNOSIS — M79.605 PAIN IN BOTH LOWER EXTREMITIES: ICD-10-CM

## 2021-09-29 DIAGNOSIS — M79.604 PAIN IN BOTH LOWER EXTREMITIES: ICD-10-CM

## 2021-09-29 NOTE — TELEPHONE ENCOUNTER
Caller: Taina Moreno    Relationship: Self    Best call back number: [phone]    What is the medical concern/diagnosis: NERVE PAIN IN LEGS, MUSCLE SPASMS AND TWITCHING OF LEGS    What specialty or service is being requested:   NEUROLOGY    What is the provider, practice or medical service name: [facility] WITHIN [facility] IN [place]     Any additional details: PATIENT HAS SEEN BOTH DR ALONZO AND MARKUS    ALSO PATIENT WOULD LIKE A REFERRAL TO A DIABETIC SPECIALIST IN [place] IF POSSIBLE

## 2021-10-19 ENCOUNTER — TELEPHONE (OUTPATIENT)
Dept: FAMILY MEDICINE CLINIC | Facility: CLINIC | Age: 71
End: 2021-10-19

## 2021-10-19 NOTE — TELEPHONE ENCOUNTER
Caller: Taina Moreno    Relationship: Self    Best call back number: [phone]    What is the medical concern/diagnosis:     What specialty or service is being requested: NEUROLOGY/ [facility]    What is the provider, practice or medical service name:     What is the office location:    What is the office phone number:     Any additional details: PATIENT ALSO REQUESTED THE PHONE NUMBER OF THE NUTRITIONIST THAT SHE HAD  TO RESCHEDULE

## 2021-10-28 DIAGNOSIS — K21.9 GASTROESOPHAGEAL REFLUX DISEASE WITHOUT ESOPHAGITIS: ICD-10-CM

## 2021-10-28 RX ORDER — FAMOTIDINE 40 MG/1
TABLET, FILM COATED ORAL
Qty: 90 TABLET | Refills: 0 | Status: SHIPPED | OUTPATIENT
Start: 2021-10-28 | End: 2022-07-15

## 2021-11-10 ENCOUNTER — TELEPHONE (OUTPATIENT)
Dept: FAMILY MEDICINE CLINIC | Facility: CLINIC | Age: 71
End: 2021-11-10

## 2021-11-10 RX ORDER — LOSARTAN POTASSIUM 100 MG/1
TABLET ORAL
Qty: 90 TABLET | Refills: 0 | OUTPATIENT
Start: 2021-11-10

## 2021-11-10 NOTE — TELEPHONE ENCOUNTER
Caller: HAILEY [facility]    Relationship: Other    Best call back number: [phone]    What medications are you currently taking:   Current Outpatient Medications on File Prior to Visit   Medication Sig Dispense Refill   • allopurinol (ZYLOPRIM) 100 MG tablet Take 2 tablets by mouth Daily. 180 tablet 3   • aspirin 81 MG tablet Take 1 tablet by mouth Daily.     • Chlorcyclizine-Pseudoephed 25-60 MG tablet 1/2-1 po q 8 hours PRN 30 tablet 1   • dilTIAZem (CARDIZEM) 120 MG tablet Take 1 tablet by mouth 2 (Two) Times a Day. 180 tablet 3   • estradiol (ESTRACE) 1 MG tablet Take 1 tablet by mouth once daily 90 tablet 0   • famotidine (PEPCID) 40 MG tablet Take 1 tablet by mouth once daily 90 tablet 0   • fluticasone (Flonase) 50 MCG/ACT nasal spray 2 sprays into the nostril(s) as directed by provider Daily. 15.8 mL 5   • glimepiride (AMARYL) 1 MG tablet Take 1 tablet by mouth twice daily 180 tablet 0   • glucose monitor monitoring kit 1 each Every Morning Before Breakfast. Please provide lancets #100 with 3 refills and testing strips #100 with 3 refills (appropriate to the glucometer chosen) 1 each 0   • loratadine (CLARITIN) 10 MG tablet Take 1 tablet by mouth Daily. 30 tablet 3   • LORazepam (Ativan) 0.5 MG tablet Take 1 tablet by mouth Every 8 (Eight) Hours As Needed for Anxiety. 15 tablet 0   • metFORMIN (GLUCOPHAGE) 500 MG tablet Take 2 tablets by mouth 2 (Two) Times a Day. 360 tablet 0   • montelukast (Singulair) 10 MG tablet Take 1 tablet by mouth Every Night. 90 tablet 1   • ondansetron (ZOFRAN) 4 MG tablet TAKE 1 TABLET BY MOUTH EVERY 8 HOURS AS NEEDED FOR NAUSEA AND VOMITING 20 tablet 0   • OneTouch Delica Lancets 33G misc Daily. USE TO TEST BLOOD GLUCOSE     • OneTouch Ultra test strip Daily. USE TO TEST BLOOD GLUCOSE     • oxybutynin (DITROPAN) 5 MG tablet Take 1 tablet by mouth Daily.  2   • tiZANidine (ZANAFLEX) 4 MG tablet Take 1 tablet by mouth At Night As Needed for Muscle Spasms. 30 tablet 1   •  torsemide (DEMADEX) 5 MG tablet Take 1 tablet by mouth Daily. 90 tablet 1   • traMADol (ULTRAM) 50 MG tablet Take 1 tablet by mouth Every 8 (Eight) Hours As Needed for Moderate Pain . 20 tablet 0   • traZODone (DESYREL) 50 MG tablet 1-2 tablets po qhs prn insomnia 60 tablet 3   • triamcinolone (KENALOG) 0.025 % ointment Apply  topically to the appropriate area as directed 2 (Two) Times a Day As Needed for Irritation. 80 g 1   • valsartan (DIOVAN) 160 MG tablet        No current facility-administered medications on file prior to visit.              Who prescribed you this medication: DR LOPES    What are your concerns: POSSIBLE GAP IN STATIN USEAGE

## 2021-11-12 NOTE — TELEPHONE ENCOUNTER
Patient is calling because her medication  Losartan Potassium 100 MG      was denied but it was sent to dr peña is why can someone else fill this?

## 2021-11-12 NOTE — TELEPHONE ENCOUNTER
Spoke with pt she is taking. Unsure why tken off med list. Pharm confirmed picked up 90 days in august. Can you refill

## 2021-11-15 RX ORDER — LOSARTAN POTASSIUM 100 MG/1
TABLET ORAL
Qty: 90 TABLET | Refills: 0 | OUTPATIENT
Start: 2021-11-15

## 2021-11-16 ENCOUNTER — TELEPHONE (OUTPATIENT)
Dept: FAMILY MEDICINE CLINIC | Facility: CLINIC | Age: 71
End: 2021-11-16

## 2021-11-16 RX ORDER — LOSARTAN POTASSIUM 100 MG/1
100 TABLET ORAL DAILY
Qty: 90 TABLET | Refills: 0 | Status: SHIPPED | OUTPATIENT
Start: 2021-11-16 | End: 2022-02-08

## 2021-11-16 NOTE — TELEPHONE ENCOUNTER
PATIENT CALLED BACK TO FOLLOW UP ON HER MEDICATION REFILL OF HER LOSARTIN WHICH STILL HAS NOT BEEN FILLED. PATIENT WAS CALLED Friday AND WAS TOLD THAT IT WAS GOING TO BE CALLED IN. [facility] STILL HAS NOT RECEIVED IT.

## 2021-11-16 NOTE — TELEPHONE ENCOUNTER
Per pharm valsartan was written 11/20 but never filled. She has still been doing losartan. Can you refill and update med list?

## 2021-11-16 NOTE — TELEPHONE ENCOUNTER
Losartan sent in    Looks like it went to other provider when I was on vacation.  There was then some confusion about which ARB she was taking based on old chart.

## 2021-12-01 ENCOUNTER — OFFICE VISIT (OUTPATIENT)
Dept: FAMILY MEDICINE CLINIC | Facility: CLINIC | Age: 71
End: 2021-12-01

## 2021-12-01 VITALS
RESPIRATION RATE: 18 BRPM | TEMPERATURE: 98.2 F | BODY MASS INDEX: 30.16 KG/M2 | HEART RATE: 70 BPM | DIASTOLIC BLOOD PRESSURE: 70 MMHG | WEIGHT: 181 LBS | HEIGHT: 65 IN | SYSTOLIC BLOOD PRESSURE: 122 MMHG

## 2021-12-01 DIAGNOSIS — J01.00 ACUTE MAXILLARY SINUSITIS, RECURRENCE NOT SPECIFIED: Primary | ICD-10-CM

## 2021-12-01 DIAGNOSIS — Z12.11 SCREENING FOR MALIGNANT NEOPLASM OF COLON: ICD-10-CM

## 2021-12-01 PROCEDURE — 99213 OFFICE O/P EST LOW 20 MIN: CPT | Performed by: PHYSICIAN ASSISTANT

## 2021-12-01 RX ORDER — AZITHROMYCIN 250 MG/1
TABLET, FILM COATED ORAL
Qty: 6 TABLET | Refills: 0 | Status: SHIPPED | OUTPATIENT
Start: 2021-12-01 | End: 2022-01-07

## 2021-12-01 NOTE — PROGRESS NOTES
Subjective   Taina Moreno is a 71 y.o. female.     History of Present Illness   Pt presents with CC of chills, cough (with mucus production), sore throat, ear pain, sob, sinus pressure X 6 days   No known sick contacts   Had COVID test earlier today. awaiting results   Has not had covid vaccinations   Taken mucinex with mild relief     Requesting order for cologuard. Been 3 years since her last one     The following portions of the patient's history were reviewed and updated as appropriate: allergies, current medications, past family history, past medical history, past social history, past surgical history and problem list.    Review of Systems   Constitutional: Positive for chills. Negative for fever.   HENT: Positive for congestion, ear pain, postnasal drip, rhinorrhea, sinus pressure and sore throat. Negative for trouble swallowing.    Respiratory: Positive for cough and shortness of breath. Negative for wheezing.    Cardiovascular: Negative for chest pain.   Gastrointestinal: Negative for diarrhea, nausea and vomiting.   Skin: Negative for rash.       Objective   Physical Exam  Vitals and nursing note reviewed.   Constitutional:       Appearance: Normal appearance. She is well-developed.   HENT:      Head: Normocephalic and atraumatic.      Right Ear: Tympanic membrane, ear canal and external ear normal.      Left Ear: Tympanic membrane, ear canal and external ear normal.      Nose: Nose normal.      Mouth/Throat:      Pharynx: No oropharyngeal exudate or posterior oropharyngeal erythema.   Eyes:      Conjunctiva/sclera: Conjunctivae normal.   Neck:      Thyroid: No thyromegaly.      Trachea: No tracheal deviation.   Cardiovascular:      Rate and Rhythm: Normal rate and regular rhythm.      Heart sounds: Normal heart sounds.   Pulmonary:      Effort: Pulmonary effort is normal. No respiratory distress.      Breath sounds: Normal breath sounds. No wheezing or rales.   Chest:      Chest wall: No tenderness.    Musculoskeletal:      Cervical back: Normal range of motion and neck supple.   Lymphadenopathy:      Cervical: No cervical adenopathy.   Skin:     General: Skin is warm and dry.   Neurological:      Mental Status: She is alert and oriented to person, place, and time.   Psychiatric:         Behavior: Behavior normal.         Thought Content: Thought content normal.         Judgment: Judgment normal.         Assessment/Plan   Diagnoses and all orders for this visit:    1. Acute maxillary sinusitis, recurrence not specified (Primary)  -     azithromycin (Zithromax Z-Mykel) 250 MG tablet; Take 2 tablets by mouth on day 1, then 1 tablet daily on days 2-5  Dispense: 6 tablet; Refill: 0    2. Screening for malignant neoplasm of colon  -     Cologuard - Stool, Per Rectum; Future    cover with Z mykel for suspected sinusitis infection. Call office with COVID results once available   Report to ER if new or worsening symptoms develop  cologuard ordered

## 2021-12-10 ENCOUNTER — TELEPHONE (OUTPATIENT)
Dept: FAMILY MEDICINE CLINIC | Facility: CLINIC | Age: 71
End: 2021-12-10

## 2021-12-10 RX ORDER — FLUCONAZOLE 200 MG/1
TABLET ORAL
Qty: 2 TABLET | Refills: 0 | Status: SHIPPED | OUTPATIENT
Start: 2021-12-10 | End: 2022-04-07

## 2021-12-10 NOTE — TELEPHONE ENCOUNTER
PATIENT ON ANTIBIOTICS FOR SINUS INFECTION AND NEEDS DIFLUCAN FOR YEAST INFECTION.     PHARMACY:  [facility]    PLEASE CALL [phone]

## 2021-12-27 RX ORDER — ESTRADIOL 1 MG/1
TABLET ORAL
Qty: 90 TABLET | Refills: 0 | Status: SHIPPED | OUTPATIENT
Start: 2021-12-27 | End: 2022-03-30

## 2022-01-02 DIAGNOSIS — E11.29 TYPE 2 DIABETES MELLITUS WITH MICROALBUMINURIA, WITHOUT LONG-TERM CURRENT USE OF INSULIN: ICD-10-CM

## 2022-01-02 DIAGNOSIS — R80.9 TYPE 2 DIABETES MELLITUS WITH MICROALBUMINURIA, WITHOUT LONG-TERM CURRENT USE OF INSULIN: ICD-10-CM

## 2022-01-03 RX ORDER — GLIMEPIRIDE 1 MG/1
TABLET ORAL
Qty: 180 TABLET | Refills: 0 | Status: SHIPPED | OUTPATIENT
Start: 2022-01-03 | End: 2022-05-02

## 2022-01-05 ENCOUNTER — TELEPHONE (OUTPATIENT)
Dept: FAMILY MEDICINE CLINIC | Facility: CLINIC | Age: 72
End: 2022-01-05

## 2022-01-07 ENCOUNTER — TELEMEDICINE (OUTPATIENT)
Dept: FAMILY MEDICINE CLINIC | Facility: CLINIC | Age: 72
End: 2022-01-07

## 2022-01-07 DIAGNOSIS — J01.00 ACUTE MAXILLARY SINUSITIS, RECURRENCE NOT SPECIFIED: Primary | ICD-10-CM

## 2022-01-07 PROCEDURE — 99213 OFFICE O/P EST LOW 20 MIN: CPT | Performed by: FAMILY MEDICINE

## 2022-01-07 RX ORDER — DOXYCYCLINE 100 MG/1
100 CAPSULE ORAL EVERY 12 HOURS SCHEDULED
Qty: 20 CAPSULE | Refills: 0 | Status: SHIPPED | OUTPATIENT
Start: 2022-01-07 | End: 2022-01-17

## 2022-01-07 RX ORDER — METHYLPREDNISOLONE 4 MG/1
TABLET ORAL
Qty: 21 TABLET | Refills: 0 | Status: CANCELLED | OUTPATIENT
Start: 2022-01-07

## 2022-01-07 NOTE — PROGRESS NOTES
Chief Complaint  Sinusitis  You have chosen to receive care through a telehealth visit.  Do you consent to use a video/audio connection for your medical care today? Yes  Subjective          Taina Moreno presents to [facility]  Sinusitis  This is a new problem. The current episode started 1 to 4 weeks ago. The problem is unchanged. There has been no fever. Associated symptoms include chills, congestion, ear pain, headaches and sinus pressure. Pertinent negatives include no coughing or sore throat. Treatments tried: Zpak, mucinex. The treatment provided mild relief.     Recently had elevated blood pressures. She woke up and had pain in one of her eyes. When she looked, saw that a blood vessel busted. Went to ER for evaluation, elevated pressure in her eye. She did follow up with Dr. Dhaliwal due to elevated eye pressure and prescribed drops for her. Pressure has normalized now.      The following portions of the patient's history were reviewed and updated as appropriate: allergies, current medications, past family history, past medical history, past social history, past surgical history and problem list.    Objective   Vital Signs:   There were no vitals taken for this visit.    Physical Exam   Unable to complete due to telephone encounter.     Result Review :                 Assessment and Plan    Diagnoses and all orders for this visit:    1. Acute maxillary sinusitis, recurrence not specified (Primary)  -     doxycycline (MONODOX) 100 MG capsule; Take 1 capsule by mouth Every 12 (Twelve) Hours for 10 days.  Dispense: 20 capsule; Refill: 0    Failed treatment with Zpak. Doxycycline to treat sinusitis. Ok to continue Mucinex as directed.   Continue care with ophthalmology regarding eye pressure and subconjunctival hemorrhage  I spent 13 minutes caring for Taina on this date of service. This time includes time spent by me in the following activities:ordering medications, tests,  or procedures and documenting information in the medical record  Follow Up   No follow-ups on file.  Patient was given instructions and counseling regarding her condition or for health maintenance advice. Please see specific information pulled into the AVS if appropriate.

## 2022-01-24 DIAGNOSIS — I10 ESSENTIAL HYPERTENSION: ICD-10-CM

## 2022-01-24 RX ORDER — DILTIAZEM HYDROCHLORIDE 120 MG/1
TABLET, FILM COATED ORAL
Qty: 360 TABLET | Refills: 0 | Status: SHIPPED | OUTPATIENT
Start: 2022-01-24 | End: 2022-08-12 | Stop reason: SDUPTHER

## 2022-01-27 DIAGNOSIS — R11.0 NAUSEA: ICD-10-CM

## 2022-01-27 DIAGNOSIS — K21.9 GASTROESOPHAGEAL REFLUX DISEASE WITHOUT ESOPHAGITIS: ICD-10-CM

## 2022-01-28 RX ORDER — ONDANSETRON 4 MG/1
TABLET, FILM COATED ORAL
Qty: 20 TABLET | Refills: 0 | Status: SHIPPED | OUTPATIENT
Start: 2022-01-28 | End: 2022-06-28 | Stop reason: SDUPTHER

## 2022-02-08 RX ORDER — LOSARTAN POTASSIUM 100 MG/1
TABLET ORAL
Qty: 90 TABLET | Refills: 0 | Status: SHIPPED | OUTPATIENT
Start: 2022-02-08 | End: 2022-05-09 | Stop reason: SDUPTHER

## 2022-02-09 ENCOUNTER — TELEPHONE (OUTPATIENT)
Dept: FAMILY MEDICINE CLINIC | Facility: CLINIC | Age: 72
End: 2022-02-09

## 2022-02-09 NOTE — TELEPHONE ENCOUNTER
Pat placed notification on my desk from [facility] stating the pt had still not completed their Cologuard test yet.     LM for pt to call us back and let us know if she intends to complete this or not.

## 2022-03-30 ENCOUNTER — TELEPHONE (OUTPATIENT)
Dept: FAMILY MEDICINE CLINIC | Facility: CLINIC | Age: 72
End: 2022-03-30

## 2022-03-30 DIAGNOSIS — R80.9 TYPE 2 DIABETES MELLITUS WITH MICROALBUMINURIA, WITHOUT LONG-TERM CURRENT USE OF INSULIN: ICD-10-CM

## 2022-03-30 DIAGNOSIS — E11.29 TYPE 2 DIABETES MELLITUS WITH MICROALBUMINURIA, WITHOUT LONG-TERM CURRENT USE OF INSULIN: ICD-10-CM

## 2022-03-30 DIAGNOSIS — M10.00 IDIOPATHIC GOUT, UNSPECIFIED CHRONICITY, UNSPECIFIED SITE: ICD-10-CM

## 2022-03-30 RX ORDER — ESTRADIOL 1 MG/1
1 TABLET ORAL DAILY
Qty: 90 TABLET | Refills: 0 | Status: SHIPPED | OUTPATIENT
Start: 2022-03-30 | End: 2022-05-02

## 2022-03-30 RX ORDER — ALLOPURINOL 100 MG/1
TABLET ORAL
Qty: 40 TABLET | Refills: 0 | Status: SHIPPED | OUTPATIENT
Start: 2022-03-30 | End: 2022-07-15

## 2022-03-31 NOTE — TELEPHONE ENCOUNTER
Please call.  I have refilled the Metformin for a 1 month supply until she is seen.  Then can decide on a 90-day supply or not.

## 2022-04-07 ENCOUNTER — HOSPITAL ENCOUNTER (OUTPATIENT)
Dept: GENERAL RADIOLOGY | Facility: HOSPITAL | Age: 72
Discharge: HOME | End: 2022-04-07
Admitting: FAMILY MEDICINE

## 2022-04-07 ENCOUNTER — OFFICE VISIT (OUTPATIENT)
Dept: FAMILY MEDICINE CLINIC | Facility: CLINIC | Age: 72
End: 2022-04-07

## 2022-04-07 VITALS
HEART RATE: 72 BPM | WEIGHT: 182.2 LBS | SYSTOLIC BLOOD PRESSURE: 134 MMHG | BODY MASS INDEX: 30.35 KG/M2 | DIASTOLIC BLOOD PRESSURE: 82 MMHG | HEIGHT: 65 IN | TEMPERATURE: 98.4 F | OXYGEN SATURATION: 97 % | RESPIRATION RATE: 18 BRPM

## 2022-04-07 DIAGNOSIS — R80.9 TYPE 2 DIABETES MELLITUS WITH MICROALBUMINURIA, WITHOUT LONG-TERM CURRENT USE OF INSULIN: ICD-10-CM

## 2022-04-07 DIAGNOSIS — E78.2 MIXED HYPERLIPIDEMIA: ICD-10-CM

## 2022-04-07 DIAGNOSIS — S83.412A SPRAIN OF MEDIAL COLLATERAL LIGAMENT OF LEFT KNEE, INITIAL ENCOUNTER: Primary | ICD-10-CM

## 2022-04-07 DIAGNOSIS — M25.562 ACUTE PAIN OF LEFT KNEE: ICD-10-CM

## 2022-04-07 DIAGNOSIS — I10 ESSENTIAL HYPERTENSION: ICD-10-CM

## 2022-04-07 DIAGNOSIS — E11.29 TYPE 2 DIABETES MELLITUS WITH MICROALBUMINURIA, WITHOUT LONG-TERM CURRENT USE OF INSULIN: ICD-10-CM

## 2022-04-07 DIAGNOSIS — M25.872 MASS OF JOINT OF LEFT FOOT: ICD-10-CM

## 2022-04-07 DIAGNOSIS — I71.21 ASCENDING AORTIC ANEURYSM: ICD-10-CM

## 2022-04-07 DIAGNOSIS — E11.42 DIABETIC PERIPHERAL NEUROPATHY ASSOCIATED WITH TYPE 2 DIABETES MELLITUS: ICD-10-CM

## 2022-04-07 PROCEDURE — 99214 OFFICE O/P EST MOD 30 MIN: CPT | Performed by: FAMILY MEDICINE

## 2022-04-07 PROCEDURE — 73562 X-RAY EXAM OF KNEE 3: CPT

## 2022-04-07 PROCEDURE — 73630 X-RAY EXAM OF FOOT: CPT

## 2022-04-07 RX ORDER — MELOXICAM 7.5 MG/1
7.5 TABLET ORAL DAILY
Qty: 30 TABLET | Refills: 1 | Status: SHIPPED | OUTPATIENT
Start: 2022-04-07 | End: 2022-05-09

## 2022-04-07 NOTE — PROGRESS NOTES
"Chief Complaint   Patient presents with   • sinus infection      Ear pain   • Knee Pain     both       Subjective      Taina Moreno is a 72 y.o. who presents for left medial knee pain that began after a twisting injury.  Since that time pain has persisted in the medial knee despite home exercises and use of a copper fit knee sleeve.  Patient reports pain radiating down into the foot as well as up into the thigh and buttock.  She is also noticed more noticeable prominence on the left proximal foot.  She has a knot in this area which was x-rayed 5 years ago without any significant deformity noted on film.  While here patient also reports diabetic neuropathy pain for which she will not regularly use the gabapentin she has been prescribed in the past.  She is due for a maintenance visit for surveillance of chronic conditions.    Objective   Vital Signs:  /82   Pulse 72   Temp 98.4 °F (36.9 °C)   Resp 18   Ht 165.1 cm (65\")   Wt 82.6 kg (182 lb 3.2 oz)   SpO2 97%   BMI 30.32 kg/m²              Physical Exam  Constitutional:       Appearance: Normal appearance.   Musculoskeletal:      Right knee: Normal.      Left knee: No swelling, deformity, effusion, erythema, ecchymosis, lacerations, bony tenderness or crepitus. Normal range of motion. Tenderness present over the MCL. Abnormal patellar mobility.      Instability Tests: Anterior drawer test negative.      Left foot: Deformity present.        Legs:       Comments: Pain with stressing of MCL on left.  Small nodule proximal left medial midfoot   Neurological:      Mental Status: She is alert.          Result Review                     Assessment and Plan  Diagnoses and all orders for this visit:    1. Sprain of medial collateral ligament of left knee, initial encounter (Primary)  Comments:  1. Meloxicam  2. Xray knee  3. PT referral  Orders:  -     meloxicam (Mobic) 7.5 MG tablet; Take 1 tablet by mouth Daily.  Dispense: 30 tablet; Refill: 1  -     " Ambulatory Referral to Physical Therapy    2. Acute pain of left knee  -     meloxicam (Mobic) 7.5 MG tablet; Take 1 tablet by mouth Daily.  Dispense: 30 tablet; Refill: 1  -     XR Knee 3 View Left; Future  -     Ambulatory Referral to Physical Therapy    3. Mass of joint of left foot  -     XR Foot 3+ View Left; Future    4. Diabetic peripheral neuropathy associated with type 2 diabetes mellitus (HCC)  Comments:  Patient will use gabapentin more consistently    5. Type 2 diabetes mellitus with microalbuminuria, without long-term current use of insulin (HCC)  -     Hemoglobin A1c    6. Essential hypertension  -     Basic Metabolic Panel    7. Mixed hyperlipidemia  -     Lipid Panel    8. Ascending aortic aneurysm (HCC)            Follow Up  Return in about 1 month (around 5/7/2022) for Maint. visit and f/u on left knee.  Patient was given instructions and counseling regarding her condition or for health maintenance advice. Please see specific information pulled into the AVS if appropriate.

## 2022-04-08 LAB
BUN SERPL-MCNC: 12 MG/DL (ref 8–27)
BUN/CREAT SERPL: 11 (ref 12–28)
CALCIUM SERPL-MCNC: 10 MG/DL (ref 8.7–10.3)
CHLORIDE SERPL-SCNC: 99 MMOL/L (ref 96–106)
CHOLEST SERPL-MCNC: 135 MG/DL (ref 100–199)
CO2 SERPL-SCNC: 23 MMOL/L (ref 20–29)
CREAT SERPL-MCNC: 1.07 MG/DL (ref 0.57–1)
EGFRCR SERPLBLD CKD-EPI 2021: 56 ML/MIN/1.73
GLUCOSE SERPL-MCNC: 126 MG/DL (ref 65–99)
HBA1C MFR BLD: 7.4 % (ref 4.8–5.6)
HDLC SERPL-MCNC: 43 MG/DL
LDLC SERPL CALC-MCNC: 54 MG/DL (ref 0–99)
POTASSIUM SERPL-SCNC: 4.1 MMOL/L (ref 3.5–5.2)
SODIUM SERPL-SCNC: 139 MMOL/L (ref 134–144)
TRIGL SERPL-MCNC: 237 MG/DL (ref 0–149)
VLDLC SERPL CALC-MCNC: 38 MG/DL (ref 5–40)

## 2022-04-11 ENCOUNTER — TELEPHONE (OUTPATIENT)
Dept: FAMILY MEDICINE CLINIC | Facility: CLINIC | Age: 72
End: 2022-04-11

## 2022-04-11 RX ORDER — DOXYCYCLINE HYCLATE 100 MG/1
100 CAPSULE ORAL 2 TIMES DAILY
Qty: 14 CAPSULE | Refills: 0 | Status: SHIPPED | OUTPATIENT
Start: 2022-04-11 | End: 2022-05-09

## 2022-04-11 NOTE — TELEPHONE ENCOUNTER
Caller: Taina Moreno    Relationship: Self    Best call back number: [phone]    Requested Prescriptions:    Doxycycline 100 MG    Pharmacy where request should be sent:      [facility] [address] [place] [address] [phone] [facility] [phone]         Additional details provided by patient:  PT STATED THAT PROVIDER FORGOT TO SEND IN REFILL FOR RX, FOR HER SINUS INFECTION    Does the patient have less than a 3 day supply:  [x] Yes  [] No    Colby Katz Rep   04/11/22 08:24 EDT

## 2022-04-29 DIAGNOSIS — E11.29 TYPE 2 DIABETES MELLITUS WITH MICROALBUMINURIA, WITHOUT LONG-TERM CURRENT USE OF INSULIN: ICD-10-CM

## 2022-04-29 DIAGNOSIS — R80.9 TYPE 2 DIABETES MELLITUS WITH MICROALBUMINURIA, WITHOUT LONG-TERM CURRENT USE OF INSULIN: ICD-10-CM

## 2022-05-02 RX ORDER — GLIMEPIRIDE 1 MG/1
TABLET ORAL
Qty: 180 TABLET | Refills: 0 | Status: SHIPPED | OUTPATIENT
Start: 2022-05-02 | End: 2022-08-12 | Stop reason: SDUPTHER

## 2022-05-02 RX ORDER — ESTRADIOL 1 MG/1
TABLET ORAL
Qty: 90 TABLET | Refills: 0 | Status: SHIPPED | OUTPATIENT
Start: 2022-05-02 | End: 2022-09-28 | Stop reason: SDUPTHER

## 2022-05-09 ENCOUNTER — OFFICE VISIT (OUTPATIENT)
Dept: FAMILY MEDICINE CLINIC | Facility: CLINIC | Age: 72
End: 2022-05-09

## 2022-05-09 VITALS
WEIGHT: 180.8 LBS | BODY MASS INDEX: 30.12 KG/M2 | RESPIRATION RATE: 20 BRPM | DIASTOLIC BLOOD PRESSURE: 68 MMHG | OXYGEN SATURATION: 99 % | HEART RATE: 72 BPM | SYSTOLIC BLOOD PRESSURE: 120 MMHG | HEIGHT: 65 IN | TEMPERATURE: 98.2 F

## 2022-05-09 DIAGNOSIS — I10 ESSENTIAL HYPERTENSION: ICD-10-CM

## 2022-05-09 DIAGNOSIS — R80.9 TYPE 2 DIABETES MELLITUS WITH MICROALBUMINURIA, WITHOUT LONG-TERM CURRENT USE OF INSULIN: Primary | ICD-10-CM

## 2022-05-09 DIAGNOSIS — Z12.31 ENCOUNTER FOR SCREENING MAMMOGRAM FOR MALIGNANT NEOPLASM OF BREAST: ICD-10-CM

## 2022-05-09 DIAGNOSIS — N18.2 CKD (CHRONIC KIDNEY DISEASE) STAGE 2, GFR 60-89 ML/MIN: ICD-10-CM

## 2022-05-09 DIAGNOSIS — E11.29 TYPE 2 DIABETES MELLITUS WITH MICROALBUMINURIA, WITHOUT LONG-TERM CURRENT USE OF INSULIN: Primary | ICD-10-CM

## 2022-05-09 DIAGNOSIS — E78.2 MIXED HYPERLIPIDEMIA: ICD-10-CM

## 2022-05-09 PROCEDURE — 99214 OFFICE O/P EST MOD 30 MIN: CPT | Performed by: FAMILY MEDICINE

## 2022-05-09 PROCEDURE — G0009 ADMIN PNEUMOCOCCAL VACCINE: HCPCS | Performed by: FAMILY MEDICINE

## 2022-05-09 PROCEDURE — 90732 PPSV23 VACC 2 YRS+ SUBQ/IM: CPT | Performed by: FAMILY MEDICINE

## 2022-05-09 RX ORDER — LOSARTAN POTASSIUM 100 MG/1
100 TABLET ORAL DAILY
Qty: 90 TABLET | Refills: 3 | Status: SHIPPED | OUTPATIENT
Start: 2022-05-09

## 2022-05-09 NOTE — PROGRESS NOTES
"Chief Complaint   Patient presents with   • Follow-up     Lab results    • review labs    • Lower Extremity Issue       Subjective      Taina Moreno is a 72 y.o. who presents for follow-up on left MCL strain along with visit regarding her diabetes, hypertension.  Labs were performed at her previous visit.    Left knee pain is improving although still present at times.  She has been taking meloxicam daily.  Prior to this she was using ibuprofen.    She checks her blood sugars each morning with glucose ranging from 1 50-1 70.  She admits forgetting to take some of her medications due to stresses at home.    Patient does not check blood pressure at home.    Labs performed at her last visit showed worsening of renal functions.  In discussion with patient this is suspected to be due to her use of NSAIDs and possibly antihistamines at the time of labs withdrawal.  There is a family history of kidney disease and previously renal function has been very stable over the years.    Patient is in need of mammogram.  She has not completed her Cologuard.  Eye exam is upcoming in the month she    Objective   Vital Signs:  /68   Pulse 72   Temp 98.2 °F (36.8 °C)   Resp 20   Ht 165.1 cm (65\")   Wt 82 kg (180 lb 12.8 oz)   SpO2 99%   BMI 30.09 kg/m²             Physical Exam  Constitutional:       Appearance: Normal appearance.   Cardiovascular:      Rate and Rhythm: Normal rate and regular rhythm.   Pulmonary:      Effort: Pulmonary effort is normal.      Breath sounds: Normal breath sounds.   Neurological:      Mental Status: She is alert.          Result Review                     Assessment and Plan  Diagnoses and all orders for this visit:    1. Type 2 diabetes mellitus with microalbuminuria, without long-term current use of insulin (HCC) (Primary)  Assessment & Plan:  Diabetes is unchanged.   Continue current treatment regimen.  Discussed foot care.  Reminded to get yearly retinal exam.  Patient will work on " decreasing consumption of sugary, processed foods and beverages  Diabetes will be reassessed in 3 months.      2. Encounter for screening mammogram for malignant neoplasm of breast  -     Mammo Screening Digital Tomosynthesis Bilateral With CAD; Future    3. CKD (chronic kidney disease) stage 2, GFR 60-89 ml/min  Assessment & Plan:  Renal condition is worsening.  Continue current treatment regimen.  Medication changes per orders.  Renal condition will be reassessed in 3 months.      4. Mixed hyperlipidemia  Assessment & Plan:  Lipid abnormalities are unchanged.  Nutritional counseling was provided.  Lipids will be reassessed in 3 months.      5. Essential hypertension  Assessment & Plan:  Hypertension is unchanged.  Continue current treatment regimen.  Continue current medications.  Blood pressure will be reassessed in 3 months.    Orders:  -     losartan (COZAAR) 100 MG tablet; Take 1 tablet by mouth Daily.  Dispense: 90 tablet; Refill: 3    Other orders  -     Pneumococcal Polysaccharide Vaccine 23-Valent Greater Than or Equal To 1yo Subcutaneous / IM          Follow Up  Return in about 3 months (around 8/9/2022) for Next scheduled follow up, Medicare Wellness.  Patient was given instructions and counseling regarding her condition or for health maintenance advice. Please see specific information pulled into the AVS if appropriate.

## 2022-05-09 NOTE — ASSESSMENT & PLAN NOTE
Diabetes is unchanged.   Continue current treatment regimen.  Discussed foot care.  Reminded to get yearly retinal exam.  Patient will work on decreasing consumption of sugary, processed foods and beverages  Diabetes will be reassessed in 3 months.

## 2022-05-09 NOTE — ASSESSMENT & PLAN NOTE
Renal condition is worsening.  Continue current treatment regimen.  Medication changes per orders.  Renal condition will be reassessed in 3 months.

## 2022-05-11 ENCOUNTER — TRANSCRIBE ORDERS (OUTPATIENT)
Dept: ADMINISTRATIVE | Facility: HOSPITAL | Age: 72
End: 2022-05-11

## 2022-05-11 DIAGNOSIS — Z12.31 VISIT FOR SCREENING MAMMOGRAM: Primary | ICD-10-CM

## 2022-05-18 ENCOUNTER — TELEPHONE (OUTPATIENT)
Dept: FAMILY MEDICINE CLINIC | Facility: CLINIC | Age: 72
End: 2022-05-18

## 2022-05-18 NOTE — TELEPHONE ENCOUNTER
The vaccine can cause arm swelling and pts to have minor symptoms of illness.  Agree with HD visit.    Ok ibuprofen for any soreness   Use Enhanced Medication Counseling?: No

## 2022-05-18 NOTE — TELEPHONE ENCOUNTER
Provider: MARKUS     Caller: AURELIO GROVE    Phone Number: [phone]    Reason for Call: PATIENT STATED THAT AFTER GETTING HER PNEUMONIA VACCINE 5/9/22.  PATIENT IS HAVING SYPTOMS OF DIARRHEA, LIGHT HEADED, FOOT SWELLING.  PATIENT HAD A SWOLLEN LEFT ARM FOR A WEEK, EXTREMELY PAINFUL AND THE ARM IS  BUT HAS GONE DOWN.  PATIENT IS GOING TO THE HEALTH DEPARTMENT TODAY TO RULE OUT COVID.  PATENT WOULD LIKE ADVISEMENT ON SYMPTOMS

## 2022-05-20 NOTE — TELEPHONE ENCOUNTER
Pt called back and said, Covid was negative and arm is better but still having some dizziness and weakness, she said, she would see how she does over the weekend and schedule an appt next week if no better.

## 2022-05-30 DIAGNOSIS — E11.29 TYPE 2 DIABETES MELLITUS WITH MICROALBUMINURIA, WITHOUT LONG-TERM CURRENT USE OF INSULIN: ICD-10-CM

## 2022-05-30 DIAGNOSIS — R80.9 TYPE 2 DIABETES MELLITUS WITH MICROALBUMINURIA, WITHOUT LONG-TERM CURRENT USE OF INSULIN: ICD-10-CM

## 2022-06-14 ENCOUNTER — TELEPHONE (OUTPATIENT)
Dept: FAMILY MEDICINE CLINIC | Facility: CLINIC | Age: 72
End: 2022-06-14

## 2022-06-14 DIAGNOSIS — E11.29 TYPE 2 DIABETES MELLITUS WITH MICROALBUMINURIA, WITHOUT LONG-TERM CURRENT USE OF INSULIN: ICD-10-CM

## 2022-06-14 DIAGNOSIS — R80.9 TYPE 2 DIABETES MELLITUS WITH MICROALBUMINURIA, WITHOUT LONG-TERM CURRENT USE OF INSULIN: ICD-10-CM

## 2022-06-28 DIAGNOSIS — K21.9 GASTROESOPHAGEAL REFLUX DISEASE WITHOUT ESOPHAGITIS: ICD-10-CM

## 2022-06-28 DIAGNOSIS — R11.0 NAUSEA: ICD-10-CM

## 2022-06-28 RX ORDER — ONDANSETRON 4 MG/1
4 TABLET, FILM COATED ORAL EVERY 8 HOURS PRN
Qty: 20 TABLET | Refills: 0 | Status: SHIPPED | OUTPATIENT
Start: 2022-06-28 | End: 2022-09-28 | Stop reason: SDUPTHER

## 2022-06-28 NOTE — TELEPHONE ENCOUNTER
Caller: Taina Moreno    Relationship: Self    Best call back number: [phone]    Requested Prescriptions:   Requested Prescriptions     Pending Prescriptions Disp Refills   • ondansetron (ZOFRAN) 4 MG tablet 20 tablet 0     Sig: Take 1 tablet by mouth Every 8 (Eight) Hours As Needed for Nausea or Vomiting.        Pharmacy where request should be sent: [facility] [address] [place] [address] [phone] [facility] [phone]     Does the patient have less than a 3 day supply:  [x] Yes  [] No    Colby Gibbons Rep   06/28/22 12:58 EDT

## 2022-07-08 ENCOUNTER — HOSPITAL ENCOUNTER (OUTPATIENT)
Dept: MAMMOGRAPHY | Facility: HOSPITAL | Age: 72
End: 2022-07-08

## 2022-07-15 DIAGNOSIS — M10.00 IDIOPATHIC GOUT, UNSPECIFIED CHRONICITY, UNSPECIFIED SITE: ICD-10-CM

## 2022-07-15 DIAGNOSIS — K21.9 GASTROESOPHAGEAL REFLUX DISEASE WITHOUT ESOPHAGITIS: ICD-10-CM

## 2022-07-15 RX ORDER — FAMOTIDINE 40 MG/1
TABLET, FILM COATED ORAL
Qty: 90 TABLET | Refills: 0 | Status: SHIPPED | OUTPATIENT
Start: 2022-07-15

## 2022-07-15 RX ORDER — ALLOPURINOL 100 MG/1
TABLET ORAL
Qty: 40 TABLET | Refills: 0 | Status: SHIPPED | OUTPATIENT
Start: 2022-07-15 | End: 2022-09-23

## 2022-08-06 DIAGNOSIS — R80.9 TYPE 2 DIABETES MELLITUS WITH MICROALBUMINURIA, WITHOUT LONG-TERM CURRENT USE OF INSULIN: ICD-10-CM

## 2022-08-06 DIAGNOSIS — E11.29 TYPE 2 DIABETES MELLITUS WITH MICROALBUMINURIA, WITHOUT LONG-TERM CURRENT USE OF INSULIN: ICD-10-CM

## 2022-08-06 DIAGNOSIS — I10 ESSENTIAL HYPERTENSION: ICD-10-CM

## 2022-08-09 RX ORDER — GLIMEPIRIDE 1 MG/1
TABLET ORAL
Qty: 180 TABLET | Refills: 0 | OUTPATIENT
Start: 2022-08-09

## 2022-08-09 RX ORDER — DILTIAZEM HYDROCHLORIDE 120 MG/1
TABLET, FILM COATED ORAL
Qty: 360 TABLET | Refills: 0 | OUTPATIENT
Start: 2022-08-09

## 2022-08-12 ENCOUNTER — OFFICE VISIT (OUTPATIENT)
Dept: FAMILY MEDICINE CLINIC | Facility: CLINIC | Age: 72
End: 2022-08-12

## 2022-08-12 VITALS
DIASTOLIC BLOOD PRESSURE: 82 MMHG | WEIGHT: 177.2 LBS | TEMPERATURE: 97.7 F | BODY MASS INDEX: 29.52 KG/M2 | OXYGEN SATURATION: 98 % | RESPIRATION RATE: 20 BRPM | HEIGHT: 65 IN | HEART RATE: 71 BPM | SYSTOLIC BLOOD PRESSURE: 160 MMHG

## 2022-08-12 DIAGNOSIS — R80.9 TYPE 2 DIABETES MELLITUS WITH MICROALBUMINURIA, WITHOUT LONG-TERM CURRENT USE OF INSULIN: Primary | ICD-10-CM

## 2022-08-12 DIAGNOSIS — I10 ESSENTIAL HYPERTENSION: ICD-10-CM

## 2022-08-12 DIAGNOSIS — J30.9 ALLERGIC RHINITIS, UNSPECIFIED SEASONALITY, UNSPECIFIED TRIGGER: ICD-10-CM

## 2022-08-12 DIAGNOSIS — Z12.31 ENCOUNTER FOR SCREENING MAMMOGRAM FOR MALIGNANT NEOPLASM OF BREAST: ICD-10-CM

## 2022-08-12 DIAGNOSIS — M79.672 LEFT FOOT PAIN: ICD-10-CM

## 2022-08-12 DIAGNOSIS — M21.6X2 ACQUIRED ANKLE PRONATION, LEFT: ICD-10-CM

## 2022-08-12 DIAGNOSIS — E11.29 TYPE 2 DIABETES MELLITUS WITH MICROALBUMINURIA, WITHOUT LONG-TERM CURRENT USE OF INSULIN: Primary | ICD-10-CM

## 2022-08-12 DIAGNOSIS — E11.42 DIABETIC POLYNEUROPATHY ASSOCIATED WITH TYPE 2 DIABETES MELLITUS: ICD-10-CM

## 2022-08-12 DIAGNOSIS — N32.81 OVERACTIVE BLADDER: ICD-10-CM

## 2022-08-12 LAB
EXPIRATION DATE: ABNORMAL
HBA1C MFR BLD: 6.8 %
Lab: ABNORMAL
POC CREATININE URINE: NORMAL
POC MICROALBUMIN URINE: NORMAL

## 2022-08-12 PROCEDURE — 3044F HG A1C LEVEL LT 7.0%: CPT | Performed by: FAMILY MEDICINE

## 2022-08-12 PROCEDURE — 82044 UR ALBUMIN SEMIQUANTITATIVE: CPT | Performed by: FAMILY MEDICINE

## 2022-08-12 PROCEDURE — 99214 OFFICE O/P EST MOD 30 MIN: CPT | Performed by: FAMILY MEDICINE

## 2022-08-12 PROCEDURE — 83036 HEMOGLOBIN GLYCOSYLATED A1C: CPT | Performed by: FAMILY MEDICINE

## 2022-08-12 RX ORDER — DILTIAZEM HYDROCHLORIDE 120 MG/1
240 TABLET, FILM COATED ORAL 2 TIMES DAILY
Qty: 360 TABLET | Refills: 3 | Status: SHIPPED | OUTPATIENT
Start: 2022-08-12

## 2022-08-12 RX ORDER — GLIMEPIRIDE 1 MG/1
1 TABLET ORAL 2 TIMES DAILY
Qty: 180 TABLET | Refills: 3 | Status: SHIPPED | OUTPATIENT
Start: 2022-08-12

## 2022-08-12 RX ORDER — OXYBUTYNIN CHLORIDE 5 MG/1
5 TABLET ORAL DAILY
Qty: 90 TABLET | Refills: 3 | Status: SHIPPED | OUTPATIENT
Start: 2022-08-12

## 2022-08-12 RX ORDER — MONTELUKAST SODIUM 10 MG/1
10 TABLET ORAL NIGHTLY
Qty: 90 TABLET | Refills: 3 | Status: SHIPPED | OUTPATIENT
Start: 2022-08-12

## 2022-08-12 NOTE — PROGRESS NOTES
"Chief Complaint   Patient presents with   • Follow-up     RF: Diltiazem, Glimepiride, Metformin & Singulair #90 day supplies to [facility]    • Diabetes   • Hypertension   • Hyperlipidemia   • Chronic Kidney Disease   • Anxiety   • order for MMG    • L foot pain      Had xray at last visit but still having pain        Subjective      Taina Moreno is a 72 y.o. who presents for diabetes, hypertension, refills on medications, and continued pain from the left foot.    Diabetes mellitus.  Blood sugars are between 115 and 170.  She denies hypoglycemia.    Hypertension.  Patient reports control at home despite blood pressure reading here in office.    Left foot pain.  At last visit x-ray was obtained which shows no abnormality.  Patient reports increasing pain and when she wears a shoe the medial aspect of the midfoot will become irritated.    Patient is in need of a mammogram    The following portions of the patient's history were reviewed and updated as appropriate: allergies, current medications, past family history, past medical history, past social history, past surgical history and problem list.    Review of Systems    Objective   Vital Signs:  /82   Pulse 71   Temp 97.7 °F (36.5 °C)   Resp 20   Ht 165.1 cm (65\")   Wt 80.4 kg (177 lb 3.2 oz)   SpO2 98%   BMI 29.49 kg/m²         Physical Exam  Cardiovascular:      Rate and Rhythm: Normal rate and regular rhythm.      Heart sounds: Normal heart sounds.   Pulmonary:      Effort: Pulmonary effort is normal.      Breath sounds: Normal breath sounds.   Musculoskeletal:      Left foot: Deformity and tenderness present.      Comments: Left ankle pronation   Neurological:      Mental Status: She is alert.          Result Review   The following data was reviewed by: Nathan Martins MD on 08/12/2022:  Most Recent A1C    HGBA1C Most Recent 8/12/22   Hemoglobin A1C 6.8                             Assessment and Plan  Diagnoses and all orders for this " visit:    1. Type 2 diabetes mellitus with microalbuminuria, without long-term current use of insulin (HCC) (Primary)  Comments:  Control improved.  Refill metformin and glimepiride.  Normal microalbumin in office today  Orders:  -     POC Glycosylated Hemoglobin (Hb A1C)  -     POC Microalbumin  -     glimepiride (AMARYL) 1 MG tablet; Take 1 tablet by mouth 2 (Two) Times a Day.  Dispense: 180 tablet; Refill: 3  -     metFORMIN (GLUCOPHAGE) 500 MG tablet; Take 2 tablets by mouth 2 (Two) Times a Day.  Dispense: 360 tablet; Refill: 3    2. Left foot pain  Comments:  Refer to podiatry  Orders:  -     Ambulatory Referral to Podiatry    3. Acquired ankle pronation, left  -     Ambulatory Referral to Podiatry    4. Diabetic polyneuropathy associated with type 2 diabetes mellitus (HCC)  Comments:  Continue gabapentin  Orders:  -     Ambulatory Referral to Podiatry    5. Allergic rhinitis, unspecified seasonality, unspecified trigger  -     montelukast (Singulair) 10 MG tablet; Take 1 tablet by mouth Every Night.  Dispense: 90 tablet; Refill: 3    6. Essential hypertension  Comments:  Continue close monitoring at home  Orders:  -     dilTIAZem (CARDIZEM) 120 MG tablet; Take 2 tablets by mouth 2 (Two) Times a Day.  Dispense: 360 tablet; Refill: 3    7. BMI 29.0-29.9,adult    8. Overactive bladder  Comments:  Start oxybutynin 5 mg at night  Orders:  -     oxybutynin (DITROPAN) 5 MG tablet; Take 1 tablet by mouth Daily.  Dispense: 90 tablet; Refill: 3    9. Encounter for screening mammogram for malignant neoplasm of breast  -     Mammo Screening Digital Tomosynthesis Bilateral With CAD; Future            Follow Up  Return in about 3 months (around 11/12/2022) for Medicare Wellness, Next scheduled follow up.  Patient was given instructions and counseling regarding her condition or for health maintenance advice. Please see specific information pulled into the AVS if appropriate.

## 2022-09-18 DIAGNOSIS — K21.9 GASTROESOPHAGEAL REFLUX DISEASE WITHOUT ESOPHAGITIS: ICD-10-CM

## 2022-09-18 DIAGNOSIS — R11.0 NAUSEA: ICD-10-CM

## 2022-09-19 RX ORDER — ONDANSETRON 4 MG/1
TABLET, FILM COATED ORAL
Qty: 20 TABLET | Refills: 0 | OUTPATIENT
Start: 2022-09-19

## 2022-09-22 ENCOUNTER — APPOINTMENT (OUTPATIENT)
Dept: MAMMOGRAPHY | Facility: HOSPITAL | Age: 72
End: 2022-09-22

## 2022-09-23 DIAGNOSIS — M10.00 IDIOPATHIC GOUT, UNSPECIFIED CHRONICITY, UNSPECIFIED SITE: ICD-10-CM

## 2022-09-23 RX ORDER — ALLOPURINOL 100 MG/1
TABLET ORAL
Qty: 60 TABLET | Refills: 5 | Status: SHIPPED | OUTPATIENT
Start: 2022-09-23

## 2022-09-27 DIAGNOSIS — R11.0 NAUSEA: ICD-10-CM

## 2022-09-27 DIAGNOSIS — R80.9 TYPE 2 DIABETES MELLITUS WITH MICROALBUMINURIA, WITHOUT LONG-TERM CURRENT USE OF INSULIN: ICD-10-CM

## 2022-09-27 DIAGNOSIS — K21.9 GASTROESOPHAGEAL REFLUX DISEASE WITHOUT ESOPHAGITIS: ICD-10-CM

## 2022-09-27 DIAGNOSIS — E11.29 TYPE 2 DIABETES MELLITUS WITH MICROALBUMINURIA, WITHOUT LONG-TERM CURRENT USE OF INSULIN: ICD-10-CM

## 2022-09-27 NOTE — TELEPHONE ENCOUNTER
Caller: Taina Moreno    Relationship: Self    Best call back number: [phone]   Requested Prescriptions:   Requested Prescriptions     Pending Prescriptions Disp Refills   • ondansetron (ZOFRAN) 4 MG tablet 20 tablet 0     Sig: Take 1 tablet by mouth Every 8 (Eight) Hours As Needed for Nausea or Vomiting.   • estradiol (ESTRACE) 1 MG tablet 90 tablet 0     Sig: Take 1 tablet by mouth Daily.   • metFORMIN (GLUCOPHAGE) 500 MG tablet 360 tablet 3     Sig: Take 2 tablets by mouth 2 (Two) Times a Day.        Pharmacy where request should be sent: [address] [phone] [facility] [phone]      Additional details provided by patient:   Does the patient have less than a 3 day supply:  [x] Yes  [] No    Colby Quintana Rep   09/27/22 08:59 EDT

## 2022-09-28 DIAGNOSIS — R11.0 NAUSEA: ICD-10-CM

## 2022-09-28 DIAGNOSIS — R80.9 TYPE 2 DIABETES MELLITUS WITH MICROALBUMINURIA, WITHOUT LONG-TERM CURRENT USE OF INSULIN: ICD-10-CM

## 2022-09-28 DIAGNOSIS — K21.9 GASTROESOPHAGEAL REFLUX DISEASE WITHOUT ESOPHAGITIS: ICD-10-CM

## 2022-09-28 DIAGNOSIS — E11.29 TYPE 2 DIABETES MELLITUS WITH MICROALBUMINURIA, WITHOUT LONG-TERM CURRENT USE OF INSULIN: ICD-10-CM

## 2022-09-28 RX ORDER — ONDANSETRON 4 MG/1
4 TABLET, FILM COATED ORAL EVERY 8 HOURS PRN
Qty: 30 TABLET | Refills: 2 | Status: SHIPPED | OUTPATIENT
Start: 2022-09-28

## 2022-09-28 RX ORDER — ONDANSETRON 4 MG/1
4 TABLET, FILM COATED ORAL EVERY 8 HOURS PRN
Qty: 20 TABLET | Refills: 0 | OUTPATIENT
Start: 2022-09-28

## 2022-09-28 RX ORDER — ESTRADIOL 1 MG/1
1 TABLET ORAL DAILY
Qty: 90 TABLET | Refills: 3 | Status: SHIPPED | OUTPATIENT
Start: 2022-09-28

## 2022-09-28 RX ORDER — ESTRADIOL 1 MG/1
1 TABLET ORAL DAILY
Qty: 90 TABLET | Refills: 0 | OUTPATIENT
Start: 2022-09-28

## 2022-09-28 NOTE — TELEPHONE ENCOUNTER
Caller: Tiffanie Taina ELIZABETH    Relationship: Self    Best call back number:     [phone]    Requested Prescriptions:   Requested Prescriptions     Pending Prescriptions Disp Refills   • metFORMIN (GLUCOPHAGE) 500 MG tablet 360 tablet 3     Sig: Take 2 tablets by mouth 2 (Two) Times a Day.   • ondansetron (ZOFRAN) 4 MG tablet 20 tablet 0     Sig: Take 1 tablet by mouth Every 8 (Eight) Hours As Needed for Nausea or Vomiting.       estradiol (ESTRACE) 1 MG tablet     IT WAS NOTED THE ESTRADIOL NEEDS TO BE REORDERED    PATIENT REQUESTED THE ZOFRAN AND ESTRADIOL BE PLACED IN DR MELENDREZ'S NAME SINCE HE IS HER PCP AT THIS TIME INSTEAD OF DR APARICIO    Pharmacy where request should be sent: [facility] [address] [place] [address] [phone] [facility] [phone] FX     Additional details provided by patient:     PATIENT STATED SHE HAS AT LEAST A (3) DAY SUPPLY OF MEDICATIONS    Does the patient have less than a 3 day supply:  [] Yes  [x] No    Colby Puga Rep   09/28/22 09:53 EDT     DR MELENDREZ

## 2022-10-20 DIAGNOSIS — R80.9 TYPE 2 DIABETES MELLITUS WITH MICROALBUMINURIA, WITHOUT LONG-TERM CURRENT USE OF INSULIN: ICD-10-CM

## 2022-10-20 DIAGNOSIS — E11.29 TYPE 2 DIABETES MELLITUS WITH MICROALBUMINURIA, WITHOUT LONG-TERM CURRENT USE OF INSULIN: ICD-10-CM

## 2022-10-20 NOTE — TELEPHONE ENCOUNTER
Caller: Taina Moreno    Relationship: Self    Best call back number: [phone]    Requested Prescriptions:   Requested Prescriptions     Pending Prescriptions Disp Refills   • metFORMIN (GLUCOPHAGE) 500 MG tablet 360 tablet 3     Sig: Take 2 tablets by mouth 2 (Two) Times a Day.        Pharmacy where request should be sent: [facility] [address] [place] [address] [phone] [facility] [phone] FX     Additional details provided by patient: PLEASE REACH OUT ONCE THIS IS SENT OVER.    Does the patient have less than a 3 day supply:  [] Yes  [x] No    Colby Edwards Rep   10/20/22 09:03 EDT

## 2023-02-13 DIAGNOSIS — I10 ESSENTIAL HYPERTENSION: ICD-10-CM

## 2023-02-13 RX ORDER — TORSEMIDE 5 MG/1
TABLET ORAL
Qty: 60 TABLET | Refills: 0 | OUTPATIENT
Start: 2023-02-13

## 2023-02-23 DIAGNOSIS — I10 ESSENTIAL HYPERTENSION: ICD-10-CM

## 2023-02-23 RX ORDER — TORSEMIDE 5 MG/1
5 TABLET ORAL DAILY
Qty: 90 TABLET | Refills: 1 | OUTPATIENT
Start: 2023-02-23

## 2023-02-23 NOTE — TELEPHONE ENCOUNTER
Caller: Taina Moreno    Relationship: Self    Best call back number: [phone]    Requested Prescriptions:   Requested Prescriptions     Pending Prescriptions Disp Refills   • torsemide (DEMADEX) 5 MG tablet 90 tablet 1     Sig: Take 1 tablet by mouth Daily.        Pharmacy where request should be sent: [facility] [address] [place] [address] [phone] [facility] [phone] FX     Additional details provided by patient: PATIENT'S BEEN OUT OF THIS MEDICATION FOR OVER A WEEK. SHE SAID SHE'S HOLDING FLUID.    Does the patient have less than a 3 day supply:  [x] Yes  [] No    Would you like a call back once the refill request has been completed: [] Yes [x] No    If the office needs to give you a call back, can they leave a voicemail: [] Yes [x] No    Colby Cruz Rep   02/23/23 13:03 EST

## 2023-02-27 ENCOUNTER — OFFICE VISIT (OUTPATIENT)
Dept: FAMILY MEDICINE CLINIC | Facility: CLINIC | Age: 73
End: 2023-02-27
Payer: MEDICARE

## 2023-02-27 VITALS
OXYGEN SATURATION: 97 % | TEMPERATURE: 97.3 F | RESPIRATION RATE: 20 BRPM | HEIGHT: 65 IN | BODY MASS INDEX: 29.62 KG/M2 | WEIGHT: 177.8 LBS | HEART RATE: 65 BPM | SYSTOLIC BLOOD PRESSURE: 160 MMHG | DIASTOLIC BLOOD PRESSURE: 70 MMHG

## 2023-02-27 DIAGNOSIS — R80.9 TYPE 2 DIABETES MELLITUS WITH MICROALBUMINURIA, WITHOUT LONG-TERM CURRENT USE OF INSULIN: Primary | ICD-10-CM

## 2023-02-27 DIAGNOSIS — Z79.899 LONG TERM CURRENT USE OF DIURETIC: ICD-10-CM

## 2023-02-27 DIAGNOSIS — Z51.81 MEDICATION MONITORING ENCOUNTER: ICD-10-CM

## 2023-02-27 DIAGNOSIS — E11.29 TYPE 2 DIABETES MELLITUS WITH MICROALBUMINURIA, WITHOUT LONG-TERM CURRENT USE OF INSULIN: Primary | ICD-10-CM

## 2023-02-27 DIAGNOSIS — J01.00 ACUTE NON-RECURRENT MAXILLARY SINUSITIS: ICD-10-CM

## 2023-02-27 DIAGNOSIS — R53.83 OTHER FATIGUE: ICD-10-CM

## 2023-02-27 DIAGNOSIS — I10 ESSENTIAL HYPERTENSION: ICD-10-CM

## 2023-02-27 LAB
EXPIRATION DATE: ABNORMAL
HBA1C MFR BLD: 7.1 %
Lab: ABNORMAL

## 2023-02-27 PROCEDURE — 83036 HEMOGLOBIN GLYCOSYLATED A1C: CPT | Performed by: FAMILY MEDICINE

## 2023-02-27 PROCEDURE — 3051F HG A1C>EQUAL 7.0%<8.0%: CPT | Performed by: FAMILY MEDICINE

## 2023-02-27 PROCEDURE — 99214 OFFICE O/P EST MOD 30 MIN: CPT | Performed by: FAMILY MEDICINE

## 2023-02-27 RX ORDER — CEPHALEXIN 500 MG/1
1000 CAPSULE ORAL 2 TIMES DAILY
Qty: 28 CAPSULE | Refills: 0 | Status: SHIPPED | OUTPATIENT
Start: 2023-02-27

## 2023-02-27 RX ORDER — TORSEMIDE 5 MG/1
5 TABLET ORAL DAILY
Qty: 90 TABLET | Refills: 1 | Status: SHIPPED | OUTPATIENT
Start: 2023-02-27

## 2023-02-27 NOTE — ASSESSMENT & PLAN NOTE
Hypertension is worsening.  Continue current treatment regimen.  Dietary sodium restriction.  Regular aerobic exercise.  Continue current medications.  Blood pressure will be reassessed at the next regular appointment.

## 2023-02-27 NOTE — PROGRESS NOTES
"Chief Complaint   Patient presents with   • Follow-up   • Diabetes   • sinus congestion & drainage      For the past week or so. Pt has not been using Fluticasone NS       Subjective      Taina Moreno is a 72 y.o. who presents for lDiabetes maintenance visit as well as needing a refill on her torsemide which she is taking for longstanding lower extremity edema.  Patient has not been monitoring her blood sugars.  She has additional complaints of 6 weeks of fatigue.  She has poor quality sleep.  She references some social/home issues that contribute to stress in her life.  She denies hypoglycemia.  She reports a history of B12 deficiency as well but is not on any supplementation.  She denies any evidence of losing blood.  She also believes she has a sinus infection as for the last 5 days she has had increased sinus pressure and postnasal drainage.    The following portions of the patient's history were reviewed and updated as appropriate: allergies, current medications, past family history, past medical history, past social history, past surgical history and problem list.    Review of Systems    Objective   Vital Signs:  /70   Pulse 65   Temp 97.3 °F (36.3 °C)   Resp 20   Ht 165.1 cm (65\")   Wt 80.6 kg (177 lb 12.8 oz)   SpO2 97%   BMI 29.59 kg/m²             Physical Exam  Constitutional:       Appearance: Normal appearance.   HENT:      Head: Normocephalic and atraumatic.      Right Ear: Tympanic membrane and ear canal normal.      Left Ear: Tympanic membrane and ear canal normal.      Nose:      Right Sinus: Maxillary sinus tenderness present.      Mouth/Throat:      Mouth: Mucous membranes are moist.      Pharynx: Oropharynx is clear.   Eyes:      Conjunctiva/sclera: Conjunctivae normal.   Cardiovascular:      Rate and Rhythm: Normal rate and regular rhythm.      Heart sounds: Normal heart sounds. No murmur heard.  Pulmonary:      Effort: Pulmonary effort is normal. No respiratory distress.      " Breath sounds: Normal breath sounds.   Abdominal:      General: There is no distension.      Tenderness: There is no abdominal tenderness.   Musculoskeletal:      Cervical back: Normal range of motion and neck supple. No tenderness.      Right lower leg: No edema.      Left lower leg: No edema.   Lymphadenopathy:      Cervical: No cervical adenopathy.   Skin:     General: Skin is warm and dry.   Neurological:      Mental Status: She is alert.   Psychiatric:         Mood and Affect: Mood normal.          Result Review   The following data was reviewed by: Nathan Martins MD on 02/27/2023:  Most Recent A1C    HGBA1C Most Recent 2/27/23   Hemoglobin A1C 7.1                         Assessment and Plan  Diagnoses and all orders for this visit:    1. Type 2 diabetes mellitus with microalbuminuria, without long-term current use of insulin (HCC) (Primary)  Assessment & Plan:  Diabetes is unchanged.   Continue current treatment regimen.  Diabetes will be reassessed in 6 months.    Orders:  -     POC Glycosylated Hemoglobin (Hb A1C)  -     Comprehensive Metabolic Panel  -     Lipid Panel    2. Essential hypertension  Assessment & Plan:  Hypertension is worsening.  Continue current treatment regimen.  Dietary sodium restriction.  Regular aerobic exercise.  Continue current medications.  Blood pressure will be reassessed at the next regular appointment.    Orders:  -     torsemide (DEMADEX) 5 MG tablet; Take 1 tablet by mouth Daily.  Dispense: 90 tablet; Refill: 1  -     Comprehensive Metabolic Panel  -     Lipid Panel  -     CBC & Differential    3. Other fatigue  Comments:  Related labs ordered    4. Long term current use of diuretic  Comments:   potassium and magnesium  Orders:  -     Magnesium    5. Medication monitoring encounter  Comments:  Check vitamin B12 level  Orders:  -     Vitamin B12    6. Acute non-recurrent maxillary sinusitis  Comments:  Start antibiotics  Orders:  -     cephalexin (Keflex) 500 MG  capsule; Take 2 capsules by mouth 2 (Two) Times a Day.  Dispense: 28 capsule; Refill: 0          Follow Up  Return in about 6 months (around 8/27/2023).  Patient was given instructions and counseling regarding her condition or for health maintenance advice. Please see specific information pulled into the AVS if appropriate.

## 2023-02-28 LAB
ALBUMIN SERPL-MCNC: 4.9 G/DL (ref 3.5–5.2)
ALBUMIN/GLOB SERPL: 2.2 G/DL
ALP SERPL-CCNC: 91 U/L (ref 39–117)
ALT SERPL-CCNC: 27 U/L (ref 1–33)
AST SERPL-CCNC: 24 U/L (ref 1–32)
BASOPHILS # BLD AUTO: 0.05 10*3/MM3 (ref 0–0.2)
BASOPHILS NFR BLD AUTO: 0.7 % (ref 0–1.5)
BILIRUB SERPL-MCNC: 0.5 MG/DL (ref 0–1.2)
BUN SERPL-MCNC: 11 MG/DL (ref 8–23)
BUN/CREAT SERPL: 14.3 (ref 7–25)
CALCIUM SERPL-MCNC: 9.4 MG/DL (ref 8.6–10.5)
CHLORIDE SERPL-SCNC: 101 MMOL/L (ref 98–107)
CHOLEST SERPL-MCNC: 118 MG/DL (ref 0–200)
CO2 SERPL-SCNC: 24.7 MMOL/L (ref 22–29)
CREAT SERPL-MCNC: 0.77 MG/DL (ref 0.57–1)
EGFRCR SERPLBLD CKD-EPI 2021: 82.6 ML/MIN/1.73
EOSINOPHIL # BLD AUTO: 0.18 10*3/MM3 (ref 0–0.4)
EOSINOPHIL NFR BLD AUTO: 2.4 % (ref 0.3–6.2)
ERYTHROCYTE [DISTWIDTH] IN BLOOD BY AUTOMATED COUNT: 12.1 % (ref 12.3–15.4)
GLOBULIN SER CALC-MCNC: 2.2 GM/DL
GLUCOSE SERPL-MCNC: 141 MG/DL (ref 65–99)
HCT VFR BLD AUTO: 42.5 % (ref 34–46.6)
HDLC SERPL-MCNC: 40 MG/DL (ref 40–60)
HGB BLD-MCNC: 14.4 G/DL (ref 12–15.9)
IMM GRANULOCYTES # BLD AUTO: 0.02 10*3/MM3 (ref 0–0.05)
IMM GRANULOCYTES NFR BLD AUTO: 0.3 % (ref 0–0.5)
LDLC SERPL CALC-MCNC: 48 MG/DL (ref 0–100)
LYMPHOCYTES # BLD AUTO: 3.09 10*3/MM3 (ref 0.7–3.1)
LYMPHOCYTES NFR BLD AUTO: 41.4 % (ref 19.6–45.3)
MAGNESIUM SERPL-MCNC: 1.5 MG/DL (ref 1.6–2.4)
MCH RBC QN AUTO: 31.4 PG (ref 26.6–33)
MCHC RBC AUTO-ENTMCNC: 33.9 G/DL (ref 31.5–35.7)
MCV RBC AUTO: 92.8 FL (ref 79–97)
MONOCYTES # BLD AUTO: 0.6 10*3/MM3 (ref 0.1–0.9)
MONOCYTES NFR BLD AUTO: 8 % (ref 5–12)
NEUTROPHILS # BLD AUTO: 3.53 10*3/MM3 (ref 1.7–7)
NEUTROPHILS NFR BLD AUTO: 47.2 % (ref 42.7–76)
NRBC BLD AUTO-RTO: 0 /100 WBC (ref 0–0.2)
PLATELET # BLD AUTO: 280 10*3/MM3 (ref 140–450)
POTASSIUM SERPL-SCNC: 4.1 MMOL/L (ref 3.5–5.2)
PROT SERPL-MCNC: 7.1 G/DL (ref 6–8.5)
RBC # BLD AUTO: 4.58 10*6/MM3 (ref 3.77–5.28)
SODIUM SERPL-SCNC: 141 MMOL/L (ref 136–145)
TRIGL SERPL-MCNC: 185 MG/DL (ref 0–150)
VIT B12 SERPL-MCNC: 266 PG/ML (ref 211–946)
VLDLC SERPL CALC-MCNC: 30 MG/DL (ref 5–40)
WBC # BLD AUTO: 7.47 10*3/MM3 (ref 3.4–10.8)

## 2023-02-28 RX ORDER — MAGNESIUM OXIDE 400 MG/1
400 TABLET ORAL 2 TIMES DAILY
Qty: 60 TABLET | Refills: 5 | Status: SHIPPED | OUTPATIENT
Start: 2023-02-28

## 2023-02-28 RX ORDER — LANOLIN ALCOHOL/MO/W.PET/CERES
1000 CREAM (GRAM) TOPICAL DAILY
Qty: 30 TABLET | Refills: 5 | Status: SHIPPED | OUTPATIENT
Start: 2023-02-28

## 2023-04-10 ENCOUNTER — TELEPHONE (OUTPATIENT)
Dept: FAMILY MEDICINE CLINIC | Facility: CLINIC | Age: 73
End: 2023-04-10
Payer: MEDICARE

## 2023-04-10 NOTE — TELEPHONE ENCOUNTER
Caller: Aurelio Moreno    Relationship: Self    Best call back number: [phone]    What form or medical record are you requesting: ASKING FOR HER PRESCRIPTION LIST    Who is requesting this form or medical record from you: AURELIO    How would you like to receive the form or medical records (pick-up, mail, fax):  BEFORE CLOSING     Timeframe paperwork needed: BEFORE CLOSING [id number]    PLEASE CALL WHEN READY

## 2023-04-30 DIAGNOSIS — K21.9 GASTROESOPHAGEAL REFLUX DISEASE WITHOUT ESOPHAGITIS: ICD-10-CM

## 2023-05-01 RX ORDER — FAMOTIDINE 40 MG/1
TABLET, FILM COATED ORAL
Qty: 90 TABLET | Refills: 0 | Status: SHIPPED | OUTPATIENT
Start: 2023-05-01

## 2023-05-07 DIAGNOSIS — I10 ESSENTIAL HYPERTENSION: ICD-10-CM

## 2023-05-08 RX ORDER — LOSARTAN POTASSIUM 100 MG/1
TABLET ORAL
Qty: 90 TABLET | Refills: 0 | Status: SHIPPED | OUTPATIENT
Start: 2023-05-08

## 2023-05-16 ENCOUNTER — OFFICE VISIT (OUTPATIENT)
Dept: FAMILY MEDICINE CLINIC | Facility: CLINIC | Age: 73
End: 2023-05-16
Payer: MEDICARE

## 2023-05-16 VITALS
RESPIRATION RATE: 16 BRPM | TEMPERATURE: 97.3 F | SYSTOLIC BLOOD PRESSURE: 132 MMHG | DIASTOLIC BLOOD PRESSURE: 66 MMHG | BODY MASS INDEX: 29.66 KG/M2 | OXYGEN SATURATION: 98 % | WEIGHT: 178 LBS | HEART RATE: 70 BPM | HEIGHT: 65 IN

## 2023-05-16 DIAGNOSIS — J01.01 ACUTE RECURRENT MAXILLARY SINUSITIS: Primary | ICD-10-CM

## 2023-05-16 DIAGNOSIS — E83.42 HYPOMAGNESEMIA: ICD-10-CM

## 2023-05-16 DIAGNOSIS — R00.2 PALPITATIONS: ICD-10-CM

## 2023-05-16 DIAGNOSIS — R06.02 SHORTNESS OF BREATH: ICD-10-CM

## 2023-05-16 DIAGNOSIS — E53.8 VITAMIN B12 DEFICIENCY: ICD-10-CM

## 2023-05-16 PROCEDURE — 3051F HG A1C>EQUAL 7.0%<8.0%: CPT | Performed by: FAMILY MEDICINE

## 2023-05-16 PROCEDURE — 99214 OFFICE O/P EST MOD 30 MIN: CPT | Performed by: FAMILY MEDICINE

## 2023-05-16 PROCEDURE — 3078F DIAST BP <80 MM HG: CPT | Performed by: FAMILY MEDICINE

## 2023-05-16 PROCEDURE — 3075F SYST BP GE 130 - 139MM HG: CPT | Performed by: FAMILY MEDICINE

## 2023-05-16 RX ORDER — CEFDINIR 300 MG/1
300 CAPSULE ORAL 2 TIMES DAILY
Qty: 14 CAPSULE | Refills: 0 | Status: SHIPPED | OUTPATIENT
Start: 2023-05-16

## 2023-05-16 RX ORDER — FLUCONAZOLE 150 MG/1
150 TABLET ORAL ONCE
Qty: 1 TABLET | Refills: 0 | Status: SHIPPED | OUTPATIENT
Start: 2023-05-16 | End: 2023-05-16

## 2023-05-16 NOTE — PROGRESS NOTES
"Chief Complaint   Patient presents with   • Weakness     Magnesium is making her nauseated and causing swelling in her hands, liquid B-12 still not helping. Can't walk, aching, winded. Feels different from her usual chronic fatigue.   • Sore Throat     Cough, drainage, runny nose, sneezing, eyes are swollen, started 2 wks ago with allergies (eyes and runny nose), coughing started Saturday.       Subjective      Taina Moreno is a 73 y.o. who presents for progressive weakness as well as concerns over sinusitis symptoms.    Weakness.  At patient's last visit she was identified as having both vitamin B12 deficiency and hypomagnesemia.  Patient has been taking oral supplementation.  She reports increasing muscular weakness over the course of the last 2 weeks along with dyspnea and palpitations.  She has required IV magnesium replacement in the past.  She has been taking her oral supplementation as prescribed.    Sinusitis.  Patient reports runny nose, sneezing, swollen and itching watering eyes along with her postnasal drainage and cough.  She is unsure of fevers.    Objective   Vital Signs:  /66   Pulse 70   Temp 97.3 °F (36.3 °C)   Resp 16   Ht 165.1 cm (65\")   Wt 80.7 kg (178 lb)   SpO2 98%   BMI 29.62 kg/m²     Physical Exam  Constitutional:       Appearance: Normal appearance.   HENT:      Head: Normocephalic and atraumatic.      Right Ear: Tympanic membrane and ear canal normal.      Left Ear: Tympanic membrane and ear canal normal.      Nose:      Right Sinus: Maxillary sinus tenderness present.      Mouth/Throat:      Mouth: Mucous membranes are moist.      Pharynx: Oropharynx is clear.   Eyes:      Conjunctiva/sclera: Conjunctivae normal.   Cardiovascular:      Rate and Rhythm: Normal rate and regular rhythm.      Heart sounds: Normal heart sounds. No murmur heard.  Pulmonary:      Effort: Pulmonary effort is normal. No respiratory distress.      Breath sounds: Normal breath sounds. "   Musculoskeletal:      Cervical back: Normal range of motion and neck supple. No tenderness.   Lymphadenopathy:      Cervical: No cervical adenopathy.   Skin:     General: Skin is warm and dry.   Neurological:      Mental Status: She is alert.   Psychiatric:         Mood and Affect: Mood normal.          Result Review                     Assessment and Plan  Diagnoses and all orders for this visit:    1. Acute recurrent maxillary sinusitis (Primary)  -     cefdinir (OMNICEF) 300 MG capsule; Take 1 capsule by mouth 2 (Two) Times a Day.  Dispense: 14 capsule; Refill: 0    2. Vitamin B12 deficiency  -     Vitamin B12    3. Hypomagnesemia  -     Magnesium  -     Basic Metabolic Panel    4. Shortness of breath  -     CBC & Differential  -     Basic Metabolic Panel    5. Palpitations  -     CBC & Differential  -     Basic Metabolic Panel    Other orders  -     fluconazole (DIFLUCAN) 150 MG tablet; Take 1 tablet by mouth 1 (One) Time for 1 dose.  Dispense: 1 tablet; Refill: 0    Plan  1.  Treat recurrent sinusitis with cefdinir 300 mg twice daily  2.  Repeat B12 and magnesium levels along with other electrolytes due to progressive weakness.  Patient does take furosemide daily  3.  CBC will also be included due to her shortness of breath and palpitations  4.  B12 remains low patient will need to begin injections.  If magnesium remains low despite supplementation we will have to arrange for IV magnesium replacement        Follow Up  No follow-ups on file.  Patient was given instructions and counseling regarding her condition or for health maintenance advice. Please see specific information pulled into the AVS if appropriate.

## 2023-05-17 LAB
BASOPHILS # BLD AUTO: 0 X10E3/UL (ref 0–0.2)
BASOPHILS NFR BLD AUTO: 1 %
BUN SERPL-MCNC: 6 MG/DL (ref 8–27)
BUN/CREAT SERPL: 8 (ref 12–28)
CALCIUM SERPL-MCNC: 8.9 MG/DL (ref 8.7–10.3)
CHLORIDE SERPL-SCNC: 102 MMOL/L (ref 96–106)
CO2 SERPL-SCNC: 22 MMOL/L (ref 20–29)
CREAT SERPL-MCNC: 0.78 MG/DL (ref 0.57–1)
EGFRCR SERPLBLD CKD-EPI 2021: 81 ML/MIN/1.73
EOSINOPHIL # BLD AUTO: 0.1 X10E3/UL (ref 0–0.4)
EOSINOPHIL NFR BLD AUTO: 2 %
ERYTHROCYTE [DISTWIDTH] IN BLOOD BY AUTOMATED COUNT: 12.1 % (ref 11.7–15.4)
GLUCOSE SERPL-MCNC: 127 MG/DL (ref 70–99)
HCT VFR BLD AUTO: 39.8 % (ref 34–46.6)
HGB BLD-MCNC: 14 G/DL (ref 11.1–15.9)
IMM GRANULOCYTES # BLD AUTO: 0 X10E3/UL (ref 0–0.1)
IMM GRANULOCYTES NFR BLD AUTO: 0 %
LYMPHOCYTES # BLD AUTO: 2.1 X10E3/UL (ref 0.7–3.1)
LYMPHOCYTES NFR BLD AUTO: 32 %
MAGNESIUM SERPL-MCNC: 1.5 MG/DL (ref 1.6–2.3)
MCH RBC QN AUTO: 31.4 PG (ref 26.6–33)
MCHC RBC AUTO-ENTMCNC: 35.2 G/DL (ref 31.5–35.7)
MCV RBC AUTO: 89 FL (ref 79–97)
MONOCYTES # BLD AUTO: 0.8 X10E3/UL (ref 0.1–0.9)
MONOCYTES NFR BLD AUTO: 12 %
NEUTROPHILS # BLD AUTO: 3.4 X10E3/UL (ref 1.4–7)
NEUTROPHILS NFR BLD AUTO: 53 %
PLATELET # BLD AUTO: 259 X10E3/UL (ref 150–450)
POTASSIUM SERPL-SCNC: 4.3 MMOL/L (ref 3.5–5.2)
RBC # BLD AUTO: 4.46 X10E6/UL (ref 3.77–5.28)
SODIUM SERPL-SCNC: 141 MMOL/L (ref 134–144)
VIT B12 SERPL-MCNC: 669 PG/ML (ref 232–1245)
WBC # BLD AUTO: 6.4 X10E3/UL (ref 3.4–10.8)

## 2023-05-19 ENCOUNTER — TELEPHONE (OUTPATIENT)
Dept: FAMILY MEDICINE CLINIC | Facility: CLINIC | Age: 73
End: 2023-05-19
Payer: MEDICARE

## 2023-05-19 NOTE — TELEPHONE ENCOUNTER
Caller: Taina Moreno    Relationship: Self    Best call back number: [phone]    What is the best time to reach you: ANY    Who are you requesting to speak with (clinical staff, provider,  specific staff member): CLINICAL    What was the call regarding: THE PATIENT STATES THAT SHE IS SUPPOSED TO HAVE A MAGNESIUM IV ON MONDAY, 5.22.23,  BUT DOES NOT KNOW WHICH HOSPITAL SHE WILL BE GOING TO. SHE STATES THAT SHE [facility].    Do you require a callback: PLEASE CALL BACK TO ADVISE.     THANK YOU.

## 2023-05-25 DIAGNOSIS — J01.01 ACUTE RECURRENT MAXILLARY SINUSITIS: ICD-10-CM

## 2023-05-25 RX ORDER — CEFDINIR 300 MG/1
300 CAPSULE ORAL 2 TIMES DAILY
Qty: 14 CAPSULE | Refills: 0 | Status: SHIPPED | OUTPATIENT
Start: 2023-05-25

## 2023-05-25 NOTE — TELEPHONE ENCOUNTER
Caller: Taina Moreno    Relationship: Self    Best call back number: [phone]-OK TO LEAVE DETAILED MESSAGE IF NO ANSWER    Requested Prescriptions:   Requested Prescriptions     Pending Prescriptions Disp Refills   • cefdinir (OMNICEF) 300 MG capsule 14 capsule 0     Sig: Take 1 capsule by mouth 2 (Two) Times a Day.        Pharmacy where request should be sent: [facility] [address] [place] [address] [phone] [facility] [phone]      Last office visit with prescribing clinician: 5/16/2023   Last telemedicine visit with prescribing clinician: Visit date not found   Next office visit with prescribing clinician: Visit date not found     Additional details provided by patient: PATIENT STILL SICK AFTER COMPLETING THE ROUND OF ANTIBIOTIC 2 DAYS AGO. STILL HAS GREEN NASAL DISCHARGE, CONGESTION, BAD COUGH.. ASKING FOR A REFILL ON THE ANTIBIOTIC.    PATIENT DID THE MAGNESIUM THERAPY INFUSION 2 DAYS AGO.    Does the patient have less than a 3 day supply:  [x] Yes  [] No    Would you like a call back once the refill request has been completed: [] Yes [x] No    If the office needs to give you a call back, can they leave a voicemail: [x] Yes [] No    Colby Paul Rep   05/25/23 12:49 EDT

## 2023-06-11 DIAGNOSIS — K21.9 GASTROESOPHAGEAL REFLUX DISEASE WITHOUT ESOPHAGITIS: ICD-10-CM

## 2023-06-11 DIAGNOSIS — R11.0 NAUSEA: ICD-10-CM

## 2023-06-12 RX ORDER — ONDANSETRON 4 MG/1
TABLET, FILM COATED ORAL
Qty: 30 TABLET | Refills: 0 | Status: SHIPPED | OUTPATIENT
Start: 2023-06-12

## 2023-06-16 ENCOUNTER — HOSPITAL ENCOUNTER (EMERGENCY)
Facility: HOSPITAL | Age: 73
Discharge: HOME | End: 2023-06-16
Attending: EMERGENCY MEDICINE
Payer: MEDICARE

## 2023-06-16 VITALS
BODY MASS INDEX: 30.66 KG/M2 | HEART RATE: 76 BPM | HEIGHT: 65 IN | TEMPERATURE: 97.7 F | WEIGHT: 184 LBS | SYSTOLIC BLOOD PRESSURE: 187 MMHG | DIASTOLIC BLOOD PRESSURE: 95 MMHG | RESPIRATION RATE: 18 BRPM | OXYGEN SATURATION: 98 %

## 2023-06-16 DIAGNOSIS — I10 ELEVATED BLOOD PRESSURE READING WITH DIAGNOSIS OF HYPERTENSION: ICD-10-CM

## 2023-06-16 DIAGNOSIS — R04.0 EPISTAXIS: Primary | ICD-10-CM

## 2023-06-16 DIAGNOSIS — Z86.79 HISTORY OF HYPERTENSION: ICD-10-CM

## 2023-06-16 DIAGNOSIS — J01.91 ACUTE RECURRENT SINUSITIS, UNSPECIFIED LOCATION: ICD-10-CM

## 2023-06-16 DIAGNOSIS — Z86.2 HISTORY OF ANEMIA: ICD-10-CM

## 2023-06-16 LAB
BASOPHILS # BLD AUTO: 0.04 10*3/MM3 (ref 0–0.2)
BASOPHILS NFR BLD AUTO: 0.6 % (ref 0–1.5)
DEPRECATED RDW RBC AUTO: 41.7 FL (ref 37–54)
EOSINOPHIL # BLD AUTO: 0.23 10*3/MM3 (ref 0–0.4)
EOSINOPHIL NFR BLD AUTO: 3.2 % (ref 0.3–6.2)
ERYTHROCYTE [DISTWIDTH] IN BLOOD BY AUTOMATED COUNT: 11.9 % (ref 12.3–15.4)
HCT VFR BLD AUTO: 40.1 % (ref 34–46.6)
HGB BLD-MCNC: 13.1 G/DL (ref 12–15.9)
IMM GRANULOCYTES # BLD AUTO: 0.02 10*3/MM3 (ref 0–0.05)
IMM GRANULOCYTES NFR BLD AUTO: 0.3 % (ref 0–0.5)
LYMPHOCYTES # BLD AUTO: 2.58 10*3/MM3 (ref 0.7–3.1)
LYMPHOCYTES NFR BLD AUTO: 36.4 % (ref 19.6–45.3)
MCH RBC QN AUTO: 30.8 PG (ref 26.6–33)
MCHC RBC AUTO-ENTMCNC: 32.7 G/DL (ref 31.5–35.7)
MCV RBC AUTO: 94.4 FL (ref 79–97)
MONOCYTES # BLD AUTO: 0.68 10*3/MM3 (ref 0.1–0.9)
MONOCYTES NFR BLD AUTO: 9.6 % (ref 5–12)
NEUTROPHILS NFR BLD AUTO: 3.54 10*3/MM3 (ref 1.7–7)
NEUTROPHILS NFR BLD AUTO: 49.9 % (ref 42.7–76)
NRBC BLD AUTO-RTO: 0 /100 WBC (ref 0–0.2)
PLATELET # BLD AUTO: 261 10*3/MM3 (ref 140–450)
PMV BLD AUTO: 11.2 FL (ref 6–12)
RBC # BLD AUTO: 4.25 10*6/MM3 (ref 3.77–5.28)
WBC NRBC COR # BLD: 7.09 10*3/MM3 (ref 3.4–10.8)

## 2023-06-16 PROCEDURE — 85025 COMPLETE CBC W/AUTO DIFF WBC: CPT | Performed by: PHYSICIAN ASSISTANT

## 2023-06-16 RX ORDER — DOXYCYCLINE 100 MG/1
100 CAPSULE ORAL 2 TIMES DAILY
Qty: 14 CAPSULE | Refills: 0 | Status: SHIPPED | OUTPATIENT
Start: 2023-06-16

## 2023-06-16 RX ORDER — OXYMETAZOLINE HYDROCHLORIDE 0.05 G/100ML
1 SPRAY NASAL ONCE
Status: COMPLETED | OUTPATIENT
Start: 2023-06-16 | End: 2023-06-16

## 2023-06-16 RX ORDER — TRANEXAMIC ACID 100 MG/ML
500 INJECTION, SOLUTION INTRAVENOUS ONCE
Status: COMPLETED | OUTPATIENT
Start: 2023-06-16 | End: 2023-06-16

## 2023-06-16 RX ORDER — LOSARTAN POTASSIUM 50 MG/1
100 TABLET ORAL
Status: DISCONTINUED | OUTPATIENT
Start: 2023-06-16 | End: 2023-06-16

## 2023-06-16 RX ADMIN — TRANEXAMIC ACID 500 MG: 100 INJECTION, SOLUTION INTRAVENOUS at 03:51

## 2023-06-16 RX ADMIN — OXYMETAZOLINE HCL 1 SPRAY: 0.05 SPRAY NASAL at 03:51

## 2023-06-16 NOTE — ED PROVIDER NOTES
EMERGENCY DEPARTMENT ENCOUNTER    Pt Name: Taina Moreno  MRN: [id number]  Pt :   1950  Room Number:    Date of encounter:  2023  PCP: Nathan Martins MD  ED Provider: MARY ELLEN Corrales    Historian: Patient    HPI:  Chief Complaint: Nosebleed    Context: Taina Moreno is a 73 y.o. female who presents to the ED c/o nosebleed.  Patient reports that she has history of nosebleeds but she has not had 1 in approximately the last 10 years.  She reports that tonight for approximately the last hour prior to presenting to the ED she was experiencing a nosebleed.  She reports that she noticed significant bleeding out of her right nostril and then noticed some out of her left.  She attempted to hold off her nose to get the bleeding to stop but was unsuccessful.  Patient is not on any blood thinners.  She reports that she did not take her medications this evening and has a habit of being noncompliant with some of her medications.  Denies any additional associated symptoms on interview and exam.  HPI     REVIEW OF SYSTEMS  A chief complaint appropriate review of systems was completed and is negative except as noted in the HPI.     PAST MEDICAL HISTORY  Past Medical History:   Diagnosis Date    Anemia     Backache     Chronic bronchitis     Depression     Generalized osteoarthritis of multiple sites     GERD (gastroesophageal reflux disease)     Gout     Hx of migraine headaches     Hypertension     Low back ache     Migraine     Neck pain     Reflux esophagitis     Sleep disturbances     Thoracic aneurysm without mention of rupture     Urinary tract infection     UTI (urinary tract infection)        PAST SURGICAL HISTORY  Past Surgical History:   Procedure Laterality Date    GALLBLADDER SURGERY      anastomosis of gallbladder    HYSTERECTOMY      TONSILLECTOMY         FAMILY HISTORY  Family History   Problem Relation Age of Onset    Arthritis Mother     Stroke Mother     Hypertension Mother      Heart attack Father     Arthritis Father     Diabetes Father     Hypertension Father     Kidney disease Father     Hyperlipidemia Sister     Migraines Sister     Diabetes Brother     Hyperlipidemia Brother     Hypertension Brother     Heart attack Other     Stroke Other     Cancer Other     Stroke Other     Obesity Other        SOCIAL HISTORY  Social History     Socioeconomic History    Marital status:    Tobacco Use    Smoking status: Never    Smokeless tobacco: Never    Tobacco comments:     21 years old   Vaping Use    Vaping Use: Never used   Substance and Sexual Activity    Alcohol use: No    Drug use: No    Sexual activity: Defer       ALLERGIES  Amlodipine, Fenofibrate, Hydroxyzine, Norvasc [amlodipine besylate], and Penicillins    PHYSICAL EXAM  Physical Exam  Vitals and nursing note reviewed.   Constitutional:       General: She is not in acute distress.     Appearance: Normal appearance. She is not ill-appearing.   HENT:      Head: Normocephalic and atraumatic.      Nose:      Right Nostril: Epistaxis present. No foreign body, septal hematoma or occlusion.      Left Nostril: Epistaxis present. No foreign body, septal hematoma or occlusion.      Mouth/Throat:      Mouth: Mucous membranes are moist.   Eyes:      Extraocular Movements: Extraocular movements intact.   Cardiovascular:      Rate and Rhythm: Normal rate.   Pulmonary:      Effort: Pulmonary effort is normal.   Abdominal:      General: There is no distension.   Musculoskeletal:         General: Normal range of motion.      Cervical back: Normal range of motion and neck supple.   Skin:     General: Skin is warm and dry.   Neurological:      General: No focal deficit present.      Mental Status: She is alert.   Psychiatric:         Mood and Affect: Mood is anxious.         Behavior: Behavior normal.       LAB RESULTS  Results for orders placed or performed in visit on 05/16/23   Magnesium    Specimen: Blood   Result Value Ref Range     Magnesium 1.5 (L) 1.6 - 2.3 mg/dL   Vitamin B12    Specimen: Blood   Result Value Ref Range    Vitamin B-12 669 232 - 1,245 pg/mL   Basic Metabolic Panel    Specimen: Blood   Result Value Ref Range    Glucose 127 (H) 70 - 99 mg/dL    BUN 6 (L) 8 - 27 mg/dL    Creatinine 0.78 0.57 - 1.00 mg/dL    EGFR Result 81 >59 mL/min/1.73    BUN/Creatinine Ratio 8 (L) 12 - 28    Sodium 141 134 - 144 mmol/L    Potassium 4.3 3.5 - 5.2 mmol/L    Chloride 102 96 - 106 mmol/L    Total CO2 22 20 - 29 mmol/L    Calcium 8.9 8.7 - 10.3 mg/dL   CBC & Differential    Specimen: Blood   Result Value Ref Range    WBC 6.4 3.4 - 10.8 x10E3/uL    RBC 4.46 3.77 - 5.28 x10E6/uL    Hemoglobin 14.0 11.1 - 15.9 g/dL    Hematocrit 39.8 34.0 - 46.6 %    MCV 89 79 - 97 fL    MCH 31.4 26.6 - 33.0 pg    MCHC 35.2 31.5 - 35.7 g/dL    RDW 12.1 11.7 - 15.4 %    Platelets 259 150 - 450 x10E3/uL    Neutrophil Rel % 53 Not Estab. %    Lymphocyte Rel % 32 Not Estab. %    Monocyte Rel % 12 Not Estab. %    Eosinophil Rel % 2 Not Estab. %    Basophil Rel % 1 Not Estab. %    Neutrophils Absolute 3.4 1.4 - 7.0 x10E3/uL    Lymphocytes Absolute 2.1 0.7 - 3.1 x10E3/uL    Monocytes Absolute 0.8 0.1 - 0.9 x10E3/uL    Eosinophils Absolute 0.1 0.0 - 0.4 x10E3/uL    Basophils Absolute 0.0 0.0 - 0.2 x10E3/uL    Immature Granulocyte Rel % 0 Not Estab. %    Immature Grans Absolute 0.0 0.0 - 0.1 x10E3/uL       If labs were ordered, I independently reviewed the results and considered them in treating the patient.    RADIOLOGY  No orders to display     [] Radiologist's Report Reviewed:  I ordered and independently reviewed the above noted radiographic studies.  See radiologist's dictation for official interpretation.      PROCEDURES    Epistaxis Management    Date/Time: 6/16/2023 4:26 AM  Performed by: Nolan Cheung PA  Authorized by: Jonnathan Pollock MD     Consent:     Consent obtained:  Verbal    Consent given by:  Patient    Risks, benefits, and alternatives were  discussed: yes      Risks discussed:  Bleeding, infection, nasal injury and pain  Anesthesia:     Anesthesia method:  None  Procedure details:     Treatment site:  R anterior and R posterior    Repair method: Afrin and nasal TXA.    Treatment complexity:  Limited    Treatment episode: initial    Post-procedure details:     Assessment:  Bleeding decreased    Procedure completion:  Tolerated    No orders to display       MEDICATIONS GIVEN IN ER    Medications   losartan (COZAAR) tablet 100 mg (has no administration in time range)   tranexamic acid 500 MG/5ML nasal (ED/Crit Care for epistaxis) - VIAL DISPENSE 500 mg (500 mg Nasal Given by Other 6/16/23 0351)   oxymetazoline (AFRIN) nasal spray 1 spray (1 spray Nasal Given by Other 6/16/23 0351)       MEDICAL DECISION MAKING, PROGRESS, and CONSULTS   Medical Decision Making  Risk  OTC drugs.  Prescription drug management.        All labs have been independently reviewed by me.  All radiology studies have been reviewed by me and the radiologist dictating the report.  All EKG's have been independently viewed by me.    [] Discussed with radiology regarding test interpretation:    Discussion below represents my analysis of pertinent findings related to patient's condition, differential diagnosis, treatment plan and final disposition.    Differential diagnosis:  The differential diagnosis associated with the patient's presentation includes: local causes including idiopathic/ multifactorial, trauma (postoperative, nose picking, foreign body, facial fractures, nasal oxygen, CPAP), inflammatory/ Infectious (allergic, viral or bacterial rhinosinusitis, granulomatous diseases, environmental irritants), systemic (hypertension, arteriosclerosis, coagulopathy, platelet deficiencies or dysfunction, leukemia, von Willebrand’s disease, hereditary hemorrhagic telangiectasia, liver failure).     Additional sources  Discussed/ obtained information from independent historians:   []  Spouse  [] Parent  [] Family member  [] Friend  [] EMS   [] Other:  External (non-ED) record review:   [] Inpatient record:   [] Office record:   [] Outpatient record:   [] Prior Outpatient labs:   [] Prior Outpatient radiology:   [] Primary Care record:   [] Outside ED record:   [] Other:   Patient's care impacted by:   [] Diabetes  [] Hypertension  [] Hyperlipidemia  [] Hypothyroidism   [] Coronary Artery Disease   [] COPD   [] Cancer   [] Obesity  [] GERD   [] Tobacco Abuse   [] Substance Abuse    [] Anxiety   [] Depression   [] Other:   Care significantly affected by Social Determinants of Health (housing and economic circumstances, unemployment)    [] Yes     [] No   If yes, Patient's care significantly limited by  Social Determinants of Health including:   [] Inadequate housing   [] Low income   [] Alcoholism and drug addiction in family   [] Problems related to primary support group   [] Unemployment   [] Problems related to employment   [] Other Social Determinants of Health:     Shared decision making:  ***    Orders placed during this visit:  Orders Placed This Encounter   Procedures    Epistaxis Management    CBC Auto Differential    CBC & Differential       I considered prescription management  with:   [] Pain medication  [] Antiviral  [] Antibiotic   [] Other:   Rationale:  Additional orders considered but not ordered:  The following testing was considered but ultimately not selected after discussion with patient/family:***    ED Course:              DIAGNOSIS  Final diagnoses:   Epistaxis   Elevated blood pressure reading with diagnosis of hypertension   History of hypertension   History of anemia       DISPOSITION    ED Disposition       ED Disposition   Discharge    Condition   Stable    Comment   --               Please note that portions of this document were completed with voice recognition software.      workup, admission, or the presence of an unstable medical condition.  I encouraged the patient to return to the emergency department immediately for ANY concerns, worsening, new complaints, or if symptoms persist and unable to seek follow-up in a timely fashion.  The patient/family expressed understanding and agreement with this plan.  The patient will follow-up with primary care for reevaluation.      Orders placed during this visit:  Orders Placed This Encounter   Procedures    Epistaxis Management    CBC Auto Differential    CBC & Differential       ED Course:    ED Course as of 06/26/23 0908   Fri Jun 16, 2023   0509 I spoke with Ms. Moreno, and recheck I do not see any active bleeding in her posterior pharynx, she does have some petechial reddened areas in the anterior plexus but no active nose bleeding now as well.  Her blood pressure is improved versus initial presentation but is in the 170 systolic range, she says that she does have some a.m. high blood pressures, with her current medication plan she is taking losartan and diltiazem. [TA]   0510 No clinical presentation of hypertensive emergency or endorgan damage. [TA]   0510 We discussed continued or recurrent sinus infections, I will provide her with an antibiotic for sinus infection, as this may be a contributing factor in her nosebleed here. [TA]   0513 Spoke with Ms. Moreno about continued monitoring for nosebleeds as well as follow-up with her high blood pressure.  She has taken doxycycline before and I will prescribe this for recurrent sinusitis infection as well, I did review her past records and saw that recent her last antibiotic she has taken with a recurrent sinus infection with Omnicef.  She may need to talk to her primary care physician if these issues continue, and for possible ENT follow-up. [TA]   0521 Tiffanie did experience some repeat bleeding from the right nare, I did place a anterior Rhino Rocket nasal balloon for hemostasis. [TA]    0378 I did review Mr. Zavaleta note, and agree with this assessment and plan, on a 2nd recheck she did have right nare bleeding after blowing her nose forcefully, and I placed a anterior Rhino Rocket packing with hemostasis. [TA]      ED Course User Index  [TA] Jonnathan Pollock MD            DIAGNOSIS  Final diagnoses:   Epistaxis   Elevated blood pressure reading with diagnosis of hypertension   History of hypertension   History of anemia   Acute recurrent sinusitis, unspecified location       DISPOSITION    ED Disposition       ED Disposition   Discharge    Condition   Stable    Comment   --               Please note that portions of this document were completed with voice recognition software.        Nolan Cheung PA  06/26/23 0962

## 2023-06-16 NOTE — DISCHARGE INSTRUCTIONS
Symptomatic care is recommended. Take all medications as prescribed and instructed. Follow up with primary care and ENT as directed or return to Emergency Department with worsening of symptoms.

## 2023-07-29 DIAGNOSIS — I10 ESSENTIAL HYPERTENSION: ICD-10-CM

## 2023-07-31 RX ORDER — LOSARTAN POTASSIUM 100 MG/1
TABLET ORAL
Qty: 90 TABLET | Refills: 0 | Status: SHIPPED | OUTPATIENT
Start: 2023-07-31

## 2023-08-09 DIAGNOSIS — M10.00 IDIOPATHIC GOUT, UNSPECIFIED CHRONICITY, UNSPECIFIED SITE: ICD-10-CM

## 2023-08-09 RX ORDER — ALLOPURINOL 100 MG/1
TABLET ORAL
Qty: 180 TABLET | Refills: 0 | Status: SHIPPED | OUTPATIENT
Start: 2023-08-09

## 2023-08-17 ENCOUNTER — TELEPHONE (OUTPATIENT)
Dept: FAMILY MEDICINE CLINIC | Facility: CLINIC | Age: 73
End: 2023-08-17
Payer: MEDICARE

## 2023-08-17 NOTE — TELEPHONE ENCOUNTER
Caller: Taina Moreno    Relationship to patient: Self    Best call back number:       [phone] (Mobile)     Chief complaint:     PATIENT STATED SHE HAS BEEN EXPERIENCING NUMBNESS/TINGLING ON LEFT SIDE OF BODY FROM NECK TO HER FOOT    Patient directed to call 911 or go to their nearest emergency room.     Patient verbalized understanding: [x] Yes  [] No  If no, why?    Additional notes:    HUB REQUESTED PATIENT CONTACT OFFICE TO SCHEDULE FOLLOW UP WITH DR MELENDREZ AFTER EMERGENCY ROOM VISIT

## 2023-08-22 ENCOUNTER — OFFICE VISIT (OUTPATIENT)
Dept: FAMILY MEDICINE CLINIC | Facility: CLINIC | Age: 73
End: 2023-08-22
Payer: MEDICARE

## 2023-08-22 VITALS
TEMPERATURE: 98.2 F | BODY MASS INDEX: 29.46 KG/M2 | RESPIRATION RATE: 18 BRPM | HEIGHT: 65 IN | SYSTOLIC BLOOD PRESSURE: 152 MMHG | HEART RATE: 72 BPM | WEIGHT: 176.8 LBS | DIASTOLIC BLOOD PRESSURE: 74 MMHG | OXYGEN SATURATION: 98 %

## 2023-08-22 DIAGNOSIS — R93.89 ABNORMAL CT OF THE CHEST: ICD-10-CM

## 2023-08-22 DIAGNOSIS — R22.2 MASS IN CHEST: ICD-10-CM

## 2023-08-22 DIAGNOSIS — K76.9 LESION OF LIVER GREATER THAN 1 CM IN DIAMETER: ICD-10-CM

## 2023-08-22 DIAGNOSIS — I69.354 HEMIPARESIS OF LEFT NONDOMINANT SIDE AS LATE EFFECT OF CEREBRAL INFARCTION: ICD-10-CM

## 2023-08-22 DIAGNOSIS — E04.2 MULTIPLE THYROID NODULES: Primary | ICD-10-CM

## 2023-08-22 DIAGNOSIS — E83.42 HYPOMAGNESEMIA: ICD-10-CM

## 2023-08-22 DIAGNOSIS — I63.9 CEREBROVASCULAR ACCIDENT (CVA), UNSPECIFIED MECHANISM: ICD-10-CM

## 2023-08-22 NOTE — PROGRESS NOTES
Chief Complaint   Patient presents with    ER f/u     [facility], Whole left side tingling, found mass on trachea/lesions on liver, still has some numbness in left hand. Aneurysm had gotten bigger, but since ER it had gotten smaller.       Subjective      Taina Moreno is a 73 y.o. who presents for abnormal tests identified at a recent ER visit.  Patient initially presented to the ER on August 17 due to tingling sensation that involved the left side of her body with sparing of the head and neck.  Patient was ruled out for acute CVA with CT head and neck angio, without and with contrast.  There is no arterial stenosis or aneurysm identified.  Patient did have thyroid nodules identified, as large as 1.2 cm.  Patient had a single thyroid nodule identified 7 years ago on a CT scan of the chest.  CT head and neck also identified a fluid density posterior to the trachea and CTA of the chest was performed which showed  both in a sending thoracic aortic aneurysm 4.2 cm in diameter and a descending thoracic aortic aneurysm 3.1 cm in diameter.  The heart appeared enlarged.  And posterior to the trachea and esophagus was a fluid-filled density measuring 1.8 x 1.6 cm this fluid-filled density does seem to have slight mass effect on the esophagus.  A CT of the abdomen and pelvis was also performed which identified a 1.5 cm liver lesion.  Liver lesion did not have characteristics of a simple cyst and follow-up testing was recommended.  Patient also has low magnesium which is a recurring issue and this was replaced.  She was discharged from the emergency room with advised to follow-up with primary care physician and cardiology.    Patient has since followed up with Dr. Toan Sanchez of cardiology service regarding her aneurysm.  He will be seeing her again in 6 months with repeat testing.    Patient continues to have abnormal sensation with description of tingling in the entire left arm, left half of the body and left leg that has  "not changed since her ER visit.    The following portions of the patient's history were reviewed and updated as appropriate: allergies, current medications, past family history, past medical history, past social history, past surgical history, and problem list.    Review of Systems    Objective   Vital Signs:  /74 Comment: She brought home b/p cuff- 151/89  Pulse 72   Temp 98.2 øF (36.8 øC)   Resp 18   Ht 165.1 cm (65\")   Wt 80.2 kg (176 lb 12.8 oz)   SpO2 98%   BMI 29.42 kg/mý     BMI is >= 25 and <30. (Overweight) The following options were offered after discussion;: nutrition counseling/recommendations        Physical Exam  Vitals reviewed.   Constitutional:       Appearance: Normal appearance.   Eyes:      General: No visual field deficit.  Cardiovascular:      Rate and Rhythm: Normal rate and regular rhythm.      Pulses: Normal pulses.      Heart sounds: Normal heart sounds.   Pulmonary:      Effort: Pulmonary effort is normal.      Breath sounds: Normal breath sounds.   Abdominal:      General: Abdomen is flat. Bowel sounds are normal.      Palpations: Abdomen is soft.   Musculoskeletal:         General: No swelling, tenderness or deformity.      Cervical back: Normal range of motion and neck supple. No rigidity.   Lymphadenopathy:      Cervical: No cervical adenopathy.   Neurological:      Mental Status: She is alert.      Cranial Nerves: No dysarthria or facial asymmetry.      Sensory: Sensation is intact.      Motor: No weakness, tremor, abnormal muscle tone or pronator drift.      Gait: Gait normal.        Result Review   The following data was reviewed by: Nathan Martins MD on 08/22/2023:    Data reviewed : Radiologic studies CT head and neck, CT a chest, CT abdomen, echocardiogram               Assessment and Plan  Diagnoses and all orders for this visit:    1. Multiple thyroid nodules (Primary)  Comments:  1.  TSH and free T4  2.  Thyroid ultrasound  3.  Refer to Dr. Martinez if " needed  Orders:  -     TSH Rfx On Abnormal To Free T4  -     US Thyroid; Future    2. Lesion of liver greater than 1 cm in diameter  Comments:  CT scan of the abdomen with liver mass protocol will be arranged at [facility]  Orders:  -     CT abdomen w contrast; Future    3. Cerebrovascular accident (CVA), unspecified mechanism  Comments:  Persistent left-sided sensory changes likely representative of CVA.  MRI of the brain will be arranged  Orders:  -     MRI Brain With & Without Contrast; Future    4. Hemiparesis of left nondominant side as late effect of cerebral infarction  -     MRI Brain With & Without Contrast; Future    5. Hypomagnesemia  Comments:  Repeat magnesium level.  Orders:  -     Magnesium    6. Abnormal CT of the chest  -     Ambulatory Referral to Cardiothoracic Surgery    7. Mass in chest  -     Ambulatory Referral to Cardiothoracic Surgery    Plan: In addition to the above due to the abnormal CT scan of the chest indicating a mass with density consistent with fluid posterior to the trachea and esophagus patient will be referred to chest surgeon for evaluation.  She is aware she will need to obtain a copy of her CT scanned on a CD    I spent 60 minutes caring for Taina on this date of service. This time includes time spent by me in the following activities:preparing for the visit, reviewing tests, performing a medically appropriate examination and/or evaluation , counseling and educating the patient/family/caregiver, ordering medications, tests, or procedures, and documenting information in the medical record    Follow Up  No follow-ups on file.  Patient was given instructions and counseling regarding her condition or for health maintenance advice. Please see specific information pulled into the AVS if appropriate.

## 2023-08-23 LAB
MAGNESIUM SERPL-MCNC: 1.5 MG/DL (ref 1.6–2.3)
TSH SERPL DL<=0.005 MIU/L-ACNC: 1.61 UIU/ML (ref 0.45–4.5)

## 2023-08-24 DIAGNOSIS — F41.9 ANXIETY: ICD-10-CM

## 2023-08-24 RX ORDER — LORAZEPAM 0.5 MG/1
TABLET ORAL
Qty: 15 TABLET | Refills: 0 | Status: SHIPPED | OUTPATIENT
Start: 2023-08-24

## 2023-08-24 NOTE — TELEPHONE ENCOUNTER
Rx Refill Note  Requested Prescriptions     Pending Prescriptions Disp Refills    LORazepam (ATIVAN) 0.5 MG tablet [Pharmacy Med Name: LORazepam 0.5 MG Oral Tablet] 15 tablet 0     Sig: TAKE 1 TABLET BY MOUTH EVERY 8 HOURS AS NEEDED FOR ANXIETY      Last office visit with prescribing clinician: 8/22/2023   Last telemedicine visit with prescribing clinician: Visit date not found   Next office visit with prescribing clinician: 10/3/2023                         Would you like a call back once the refill request has been completed: [] Yes [] No    If the office needs to give you a call back, can they leave a voicemail: [] Yes [] No    Ophelia Flannery MA  08/24/23, 15:22 EDT

## 2023-08-25 DIAGNOSIS — R11.0 NAUSEA: ICD-10-CM

## 2023-08-25 DIAGNOSIS — K21.9 GASTROESOPHAGEAL REFLUX DISEASE WITHOUT ESOPHAGITIS: ICD-10-CM

## 2023-08-25 RX ORDER — ONDANSETRON 4 MG/1
TABLET, FILM COATED ORAL
Qty: 30 TABLET | Refills: 0 | Status: SHIPPED | OUTPATIENT
Start: 2023-08-25

## 2023-08-30 ENCOUNTER — TELEPHONE (OUTPATIENT)
Dept: FAMILY MEDICINE CLINIC | Facility: CLINIC | Age: 73
End: 2023-08-30
Payer: MEDICARE

## 2023-08-30 NOTE — TELEPHONE ENCOUNTER
I asked pt who prescribed this for her because we have no record of it and neither did [facility]. She said, possibly Dr. Nation or an Endo she seen along time ago. She said, she may take one or two a month. I informed her this was a controlled substance and we could not just send in a different dose of this without seeing her. I informed her if she still had some she could cut the tablets in two since they are scored tabs.

## 2023-08-30 NOTE — TELEPHONE ENCOUNTER
Caller: Taina Moreno    Relationship: Self    Best call back number: [phone]     Requested Prescriptions:   GABAPENTIN TAKE 1 EVERY 6 HOURS        Pharmacy where request should be sent: [facility] [address] [place] [address] [phone] [facility] [phone] FX     Last office visit with prescribing clinician: 8/22/2023   Last telemedicine visit with prescribing clinician: Visit date not found   Next office visit with prescribing clinician: 10/3/2023     Additional details provided by patient: PATIENT WAS  MG HOWEVER DUE TO THYROID ISSUES IT IS HARD FOR HER TO SWALLOW. SHE WANTS TO KNOW IF A LOWER DOSAGE COULD BE CALLED IN THAT IS SMALL ENOUGH FOR HER TO SWALLOW, MAYBE TAKE 300 MG OR TAKE 2 300 MG.    SHE DOES NOT CARE HOW IT IS WRITTEN BUT  MG TABLET I TOO BIG. PLEASE ADVISE     Does the patient have less than a 3 day supply:  [x] Yes  [] No    Would you like a call back once the refill request has been completed: [x] Yes [] No    If the office needs to give you a call back, can they leave a voicemail: [x] Yes [] No    Colby Burdick Rep   08/30/23 13:33 EDT

## 2023-09-05 ENCOUNTER — TELEPHONE (OUTPATIENT)
Dept: FAMILY MEDICINE CLINIC | Facility: CLINIC | Age: 73
End: 2023-09-05
Payer: MEDICARE

## 2023-09-05 NOTE — TELEPHONE ENCOUNTER
Pt is requesting a new referral be sent to [facility] for MRI brain with sedation.      [phone]

## 2023-09-15 ENCOUNTER — HOSPITAL ENCOUNTER (OUTPATIENT)
Dept: ULTRASOUND IMAGING | Facility: HOSPITAL | Age: 73
Discharge: HOME | End: 2023-09-15
Admitting: FAMILY MEDICINE
Payer: MEDICARE

## 2023-09-15 DIAGNOSIS — E04.2 MULTIPLE THYROID NODULES: ICD-10-CM

## 2023-09-15 PROCEDURE — 76536 US EXAM OF HEAD AND NECK: CPT

## 2023-09-18 DIAGNOSIS — F41.9 ANXIETY: ICD-10-CM

## 2023-09-18 RX ORDER — LORAZEPAM 0.5 MG/1
TABLET ORAL
Qty: 15 TABLET | Refills: 0 | Status: SHIPPED | OUTPATIENT
Start: 2023-09-18

## 2023-09-19 RX ORDER — ESTRADIOL 1 MG/1
TABLET ORAL
Qty: 90 TABLET | Refills: 0 | Status: SHIPPED | OUTPATIENT
Start: 2023-09-19

## 2023-10-01 DIAGNOSIS — E11.29 TYPE 2 DIABETES MELLITUS WITH MICROALBUMINURIA, WITHOUT LONG-TERM CURRENT USE OF INSULIN: ICD-10-CM

## 2023-10-01 DIAGNOSIS — R80.9 TYPE 2 DIABETES MELLITUS WITH MICROALBUMINURIA, WITHOUT LONG-TERM CURRENT USE OF INSULIN: ICD-10-CM

## 2023-10-02 RX ORDER — GLIMEPIRIDE 1 MG/1
TABLET ORAL
Qty: 180 TABLET | Refills: 0 | Status: SHIPPED | OUTPATIENT
Start: 2023-10-02

## 2023-10-03 ENCOUNTER — OFFICE VISIT (OUTPATIENT)
Dept: FAMILY MEDICINE CLINIC | Facility: CLINIC | Age: 73
End: 2023-10-03
Payer: MEDICARE

## 2023-10-03 VITALS
DIASTOLIC BLOOD PRESSURE: 74 MMHG | SYSTOLIC BLOOD PRESSURE: 152 MMHG | OXYGEN SATURATION: 98 % | WEIGHT: 175.8 LBS | BODY MASS INDEX: 29.29 KG/M2 | TEMPERATURE: 98 F | HEIGHT: 65 IN | HEART RATE: 74 BPM | RESPIRATION RATE: 18 BRPM

## 2023-10-03 DIAGNOSIS — I10 ESSENTIAL HYPERTENSION: ICD-10-CM

## 2023-10-03 DIAGNOSIS — F41.9 ANXIETY: ICD-10-CM

## 2023-10-03 DIAGNOSIS — E04.2 MULTINODULAR THYROID: Primary | ICD-10-CM

## 2023-10-03 DIAGNOSIS — E83.42 HYPOMAGNESEMIA: ICD-10-CM

## 2023-10-03 DIAGNOSIS — J01.00 ACUTE MAXILLARY SINUSITIS, RECURRENCE NOT SPECIFIED: ICD-10-CM

## 2023-10-03 DIAGNOSIS — D18.03 HEPATIC HEMANGIOMA: ICD-10-CM

## 2023-10-03 PROCEDURE — 3051F HG A1C>EQUAL 7.0%<8.0%: CPT | Performed by: FAMILY MEDICINE

## 2023-10-03 PROCEDURE — 3078F DIAST BP <80 MM HG: CPT | Performed by: FAMILY MEDICINE

## 2023-10-03 PROCEDURE — 99214 OFFICE O/P EST MOD 30 MIN: CPT | Performed by: FAMILY MEDICINE

## 2023-10-03 PROCEDURE — 3077F SYST BP >= 140 MM HG: CPT | Performed by: FAMILY MEDICINE

## 2023-10-03 RX ORDER — POTASSIUM CHLORIDE 750 MG/1
10 TABLET, EXTENDED RELEASE ORAL DAILY
COMMUNITY

## 2023-10-03 RX ORDER — GABAPENTIN 600 MG/1
600 TABLET ORAL 3 TIMES DAILY
COMMUNITY
End: 2023-10-03 | Stop reason: ALTCHOICE

## 2023-10-03 RX ORDER — LORAZEPAM 0.5 MG/1
0.5 TABLET ORAL EVERY 8 HOURS PRN
Qty: 15 TABLET | Refills: 0 | Status: SHIPPED | OUTPATIENT
Start: 2023-10-03

## 2023-10-03 RX ORDER — GABAPENTIN 300 MG/1
300 CAPSULE ORAL DAILY
Qty: 30 CAPSULE | Refills: 1 | Status: SHIPPED | OUTPATIENT
Start: 2023-10-03

## 2023-10-03 RX ORDER — CEFDINIR 300 MG/1
300 CAPSULE ORAL 2 TIMES DAILY
Qty: 14 CAPSULE | Refills: 0 | Status: SHIPPED | OUTPATIENT
Start: 2023-10-03

## 2023-10-03 RX ORDER — AMITRIPTYLINE HYDROCHLORIDE 10 MG/1
10 TABLET, FILM COATED ORAL DAILY
COMMUNITY

## 2023-10-03 RX ORDER — NAPROXEN 500 MG/1
1 TABLET ORAL 2 TIMES DAILY WITH MEALS
COMMUNITY

## 2023-10-03 NOTE — PROGRESS NOTES
Chief Complaint   Patient presents with    6 wk f/u     Thyroid f/u    Anxiety     X6wks.    Fatigue     Weakness and fatigue, x6wks       Subjective      Taina Moreno is a 73 y.o. who presents for multiple reasons.    Thyroid nodules.  Patient had recent ultrasound which showed 3 nodules.  1 nodule was 1.6 cm in diameter and has been recommended to continue surveillance of this.  However this same nodule was identified on ultrasound in 2016 and is unchanged.    Mediastinal mass.  Patient was referred to CT surgery for her thoracic aneurysm and mediastinal mass.  Thoracic aneurysm is stable and will be reassessed in 2 years.  Patient has been referred on to a specialist at the [facility] for the mediastinal mass that has the appearance of a cyst.    Sensory disturbance of the left side of the body.  Suspected to be due to  possible CVA.  Patient has MRI scheduled this week.    Suspected CVA.  MRI is scheduled later this week.  Patient has been taking aspirin 81 mg daily until she developed a nosebleed which she could not get to stop and ultimately required cautery.  She has since stopped taking aspirin.    HRT.  Because of the suspected stroke patient has been advised to discontinue her estrogen.    Liver lesion.  Patient had a hemangioma versus a cyst identified on the CT abdomen and pelvis.  Patient was reassured this is not related to any malignancy and does not require further evaluation.  The liver lesion has been seen on multiple prior exams and is unchanged in size.    Patient endorses 2 weeks of URI symptoms that have settled in the right maxillary sinus with increased nasal discharge.    Anxiety.  Patient uses lorazepam infrequently.  However recently due to some health related problems of friends as well as her upcoming MRI she is requesting a refill of her lorazepam and her gabapentin.    Diabetes mellitus with questionable neuropathy.  Patient was prescribed gabapentin at 1 point for  "neuropathy.  However she describes an asymmetric neuropathy that is worse during times of stress, and this is why she has used gabapentin recently.    Hypomagnesemia.  Patient is on magnesium supplementation.  She has required IV infusions in the past.  She does take torsemide daily.    The following portions of the patient's history were reviewed and updated as appropriate: allergies, current medications, past family history, past medical history, past social history, past surgical history, and problem list.    Review of Systems    Objective   Vital Signs:  /74   Pulse 74   Temp 98 °F (36.7 °C)   Resp 18   Ht 165.1 cm (65\")   Wt 79.7 kg (175 lb 12.8 oz)   SpO2 98%   BMI 29.25 kg/m²               Physical Exam  Vitals reviewed.   Constitutional:       Appearance: Normal appearance.   Cardiovascular:      Rate and Rhythm: Normal rate and regular rhythm.      Pulses: Normal pulses.      Heart sounds: Normal heart sounds.   Pulmonary:      Effort: Pulmonary effort is normal.      Breath sounds: Normal breath sounds.   Neurological:      Mental Status: She is alert.        Result Review                     Assessment and Plan  Diagnoses and all orders for this visit:    1. Multinodular thyroid (Primary)    2. Hypomagnesemia  -     Magnesium    3. Anxiety  -     LORazepam (ATIVAN) 0.5 MG tablet; Take 1 tablet by mouth Every 8 (Eight) Hours As Needed for Anxiety. for anxiety  Dispense: 15 tablet; Refill: 0  -     gabapentin (NEURONTIN) 300 MG capsule; Take 1 capsule by mouth Daily.  Dispense: 30 capsule; Refill: 1    4. Essential hypertension    5. Acute maxillary sinusitis, recurrence not specified    6. Hepatic hemangioma    Other orders  -     cefdinir (OMNICEF) 300 MG capsule; Take 1 capsule by mouth 2 (Two) Times a Day.  Dispense: 14 capsule; Refill: 0    Plan  1.  Patient has multinodular thyroid with stable nodules.  She does not require additional surveillance ultrasounds.  2.  Repeat magnesium level " for her hypomagnesemia.  Continue supplementation.  Neck step would be discontinuation of torsemide  3.  For her anxiety surrounding her health, the health of others, and her upcoming MRI her lorazepam was refilled.  Gabapentin will be refilled as this seems to aid her anxiety.  4.  Blood pressure is not controlled.  She is to monitor twice daily and bring records to her next appointment.  Amlodipine would be preferred but patient has had a reaction to this medication.  5.  Patient will be given cefdinir for her acute sinusitis  6.  Patient was reassured that her hepatic lesion is not malignant and does not require further evaluations.  7.  Patient will return in 2 months for wellness visit        Follow Up  Return in about 2 months (around 12/3/2023) for Medicare Wellness, Annual.  Patient was given instructions and counseling regarding her condition or for health maintenance advice. Please see specific information pulled into the AVS if appropriate.

## 2023-10-04 LAB — MAGNESIUM SERPL-MCNC: 1.9 MG/DL (ref 1.6–2.3)

## 2023-10-19 DIAGNOSIS — I10 ESSENTIAL HYPERTENSION: ICD-10-CM

## 2023-10-20 RX ORDER — LOSARTAN POTASSIUM 100 MG/1
TABLET ORAL
Qty: 90 TABLET | Refills: 0 | Status: SHIPPED | OUTPATIENT
Start: 2023-10-20

## 2023-11-10 DIAGNOSIS — K21.9 GASTROESOPHAGEAL REFLUX DISEASE WITHOUT ESOPHAGITIS: ICD-10-CM

## 2023-11-10 DIAGNOSIS — R11.0 NAUSEA: ICD-10-CM

## 2023-11-10 RX ORDER — ONDANSETRON 4 MG/1
TABLET, FILM COATED ORAL
Qty: 30 TABLET | Refills: 0 | Status: SHIPPED | OUTPATIENT
Start: 2023-11-10

## 2023-12-01 DIAGNOSIS — I10 ESSENTIAL HYPERTENSION: ICD-10-CM

## 2023-12-01 RX ORDER — DILTIAZEM HYDROCHLORIDE 120 MG/1
240 TABLET, FILM COATED ORAL 2 TIMES DAILY
Qty: 360 TABLET | Refills: 0 | Status: SHIPPED | OUTPATIENT
Start: 2023-12-01

## 2023-12-11 DIAGNOSIS — F41.9 ANXIETY: ICD-10-CM

## 2023-12-12 RX ORDER — LORAZEPAM 0.5 MG/1
0.5 TABLET ORAL EVERY 8 HOURS PRN
Qty: 15 TABLET | Refills: 0 | Status: SHIPPED | OUTPATIENT
Start: 2023-12-12

## 2023-12-12 RX ORDER — ESTRADIOL 1 MG/1
TABLET ORAL
Qty: 90 TABLET | Refills: 0 | OUTPATIENT
Start: 2023-12-12

## 2023-12-14 ENCOUNTER — OFFICE VISIT (OUTPATIENT)
Dept: FAMILY MEDICINE CLINIC | Facility: CLINIC | Age: 73
End: 2023-12-14
Payer: MEDICARE

## 2023-12-14 VITALS
RESPIRATION RATE: 20 BRPM | WEIGHT: 179 LBS | OXYGEN SATURATION: 98 % | SYSTOLIC BLOOD PRESSURE: 140 MMHG | HEART RATE: 64 BPM | DIASTOLIC BLOOD PRESSURE: 70 MMHG | TEMPERATURE: 97.8 F | HEIGHT: 65 IN | BODY MASS INDEX: 29.82 KG/M2

## 2023-12-14 DIAGNOSIS — R80.9 TYPE 2 DIABETES MELLITUS WITH MICROALBUMINURIA, WITHOUT LONG-TERM CURRENT USE OF INSULIN: ICD-10-CM

## 2023-12-14 DIAGNOSIS — E11.29 TYPE 2 DIABETES MELLITUS WITH MICROALBUMINURIA, WITHOUT LONG-TERM CURRENT USE OF INSULIN: ICD-10-CM

## 2023-12-14 DIAGNOSIS — E79.0 HYPERURICEMIA: ICD-10-CM

## 2023-12-14 DIAGNOSIS — Z00.00 MEDICARE ANNUAL WELLNESS VISIT, SUBSEQUENT: Primary | ICD-10-CM

## 2023-12-14 DIAGNOSIS — I10 ESSENTIAL HYPERTENSION: ICD-10-CM

## 2023-12-14 DIAGNOSIS — E83.42 HYPOMAGNESEMIA: ICD-10-CM

## 2023-12-14 DIAGNOSIS — K21.9 GASTROESOPHAGEAL REFLUX DISEASE WITHOUT ESOPHAGITIS: ICD-10-CM

## 2023-12-14 LAB
EXPIRATION DATE: NORMAL
Lab: NORMAL
POC CREATININE URINE: NORMAL
POC MICROALBUMIN URINE: NORMAL

## 2023-12-14 PROCEDURE — 1170F FXNL STATUS ASSESSED: CPT | Performed by: FAMILY MEDICINE

## 2023-12-14 PROCEDURE — 3077F SYST BP >= 140 MM HG: CPT | Performed by: FAMILY MEDICINE

## 2023-12-14 PROCEDURE — 82044 UR ALBUMIN SEMIQUANTITATIVE: CPT | Performed by: FAMILY MEDICINE

## 2023-12-14 PROCEDURE — 3078F DIAST BP <80 MM HG: CPT | Performed by: FAMILY MEDICINE

## 2023-12-14 PROCEDURE — G0439 PPPS, SUBSEQ VISIT: HCPCS | Performed by: FAMILY MEDICINE

## 2023-12-14 PROCEDURE — 3051F HG A1C>EQUAL 7.0%<8.0%: CPT | Performed by: FAMILY MEDICINE

## 2023-12-14 NOTE — PROGRESS NOTES
The ABCs of the Annual Wellness Visit  Subsequent Medicare Wellness Visit    Subjective      Taina Moreno is a 73 y.o. female who presents for a Subsequent Medicare Wellness Visit.    The following portions of the patient's history were reviewed and   updated as appropriate: allergies, current medications, past family history, past medical history, past social history, past surgical history, and problem list.    Compared to one year ago, the patient feels her physical   health is worse. She constantly feels tired with muscle weakness.     Compared to one year ago, the patient feels her mental   health is the same.    Recent Hospitalizations:  She was not admitted to the hospital during the last year.       Current Medical Providers:  Patient Care Team:  Nathan Martins MD as PCP - General (Family Medicine)  Toan Sanchez MD (Cardiology)  Sriram Valera MD (Urology)    Outpatient Medications Prior to Visit   Medication Sig Dispense Refill    allopurinol (ZYLOPRIM) 100 MG tablet Take 2 tablets by mouth once daily 180 tablet 0    amitriptyline (ELAVIL) 10 MG tablet Take 1 tablet by mouth Daily.      dilTIAZem (CARDIZEM) 120 MG tablet Take 2 tablets by mouth twice daily 360 tablet 0    fluticasone (Flonase) 50 MCG/ACT nasal spray 2 sprays into the nostril(s) as directed by provider Daily. 15.8 mL 5    gabapentin (NEURONTIN) 300 MG capsule Take 1 capsule by mouth Daily. 30 capsule 1    glimepiride (AMARYL) 1 MG tablet Take 1 tablet by mouth twice daily 180 tablet 0    glucose monitor monitoring kit 1 each Every Morning Before Breakfast. Please provide lancets #100 with 3 refills and testing strips #100 with 3 refills (appropriate to the glucometer chosen) 1 each 0    loratadine (CLARITIN) 10 MG tablet Take 1 tablet by mouth Daily. 30 tablet 3    LORazepam (ATIVAN) 0.5 MG tablet TAKE 1 TABLET BY MOUTH EVERY 8 HOURS AS NEEDED FOR ANXIETY 15 tablet 0    losartan (COZAAR) 100 MG tablet Take 1  tablet by mouth once daily 90 tablet 0    magnesium oxide (MAG-OX) 400 MG tablet Take 1 tablet by mouth 2 (Two) Times a Day. 60 tablet 5    metFORMIN (GLUCOPHAGE) 500 MG tablet Take 2 tablets by mouth 2 (Two) Times a Day. 360 tablet 3    montelukast (Singulair) 10 MG tablet Take 1 tablet by mouth Every Night. 90 tablet 3    naproxen (NAPROSYN) 500 MG tablet Take 1 tablet by mouth 2 (Two) Times a Day With Meals.      ondansetron (ZOFRAN) 4 MG tablet TAKE 1 TABLET BY MOUTH EVERY 8 HOURS AS NEEDED FOR NAUSEA FOR VOMITING 30 tablet 0    OneTouch Delica Lancets 33G misc Daily. USE TO TEST BLOOD GLUCOSE      OneTouch Ultra test strip Daily. USE TO TEST BLOOD GLUCOSE      oxybutynin (DITROPAN) 5 MG tablet Take 2 tablets by mouth Daily. 90 tablet 3    tiZANidine (ZANAFLEX) 4 MG tablet Take 1 tablet by mouth At Night As Needed for Muscle Spasms. 30 tablet 1    traMADol (ULTRAM) 50 MG tablet Take 1 tablet by mouth Every 8 (Eight) Hours As Needed for Moderate Pain . 20 tablet 0    traZODone (DESYREL) 50 MG tablet 1-2 tablets po qhs prn insomnia 60 tablet 3    vitamin B-12 (CYANOCOBALAMIN) 1000 MCG tablet Take 1 tablet by mouth Daily. 30 tablet 5    famotidine (PEPCID) 40 MG tablet Take 1 tablet by mouth once daily 90 tablet 1    aspirin 81 MG tablet Take 1 tablet by mouth Daily. (Patient not taking: Reported on 8/22/2023)      cefdinir (OMNICEF) 300 MG capsule Take 1 capsule by mouth 2 (Two) Times a Day. (Patient not taking: Reported on 12/14/2023) 14 capsule 0    potassium chloride (K-DUR,KLOR-CON) 10 MEQ CR tablet Take 1 tablet by mouth Daily. (Patient not taking: Reported on 10/3/2023)      torsemide (DEMADEX) 5 MG tablet Take 1 tablet by mouth Daily. (Patient not taking: Reported on 12/14/2023) 90 tablet 1     No facility-administered medications prior to visit.       Opioid medication/s are on active medication list.  and I have evaluated her active treatment plan and pain score trends (see table).  Vitals:    12/14/23  "1006   PainSc:   2   PainLoc: Knee     I have reviewed the chart for potential of high risk medication and harmful drug interactions in the elderly.          Aspirin is not on active medication list.  Aspirin use is indicated based on review of current medical condition/s. Pros and cons of this therapy have been discussed with this patient. Benefits of this medication outweigh potential harm.  Patient has been instructed to start taking this medication..    Patient Active Problem List   Diagnosis    Allergic rhinitis    Abnormal liver enzymes    Fibromyalgia    Gout    Mixed hyperlipidemia    Essential hypertension    Headache, migraine    Osteoarthritis    Overactive bladder    Type 2 diabetes mellitus with microalbuminuria, without long-term current use of insulin    Cobalamin deficiency    Anxiety    Sleep disturbances    Gastroesophageal reflux disease without esophagitis    Diabetic polyneuropathy associated with type 2 diabetes mellitus    CKD (chronic kidney disease) stage 2, GFR 60-89 ml/min    Hepatic hemangioma     Advance Care Planning   Advance Care Planning     Advance Directive is not on file.  ACP discussion was held with the patient during this visit. Patient does not have an advance directive, information provided.     Objective    Vitals:    12/14/23 1006   BP: 140/70   Pulse: 64   Resp: 20   Temp: 97.8 °F (36.6 °C)   SpO2: 98%   Weight: 81.2 kg (179 lb)   Height: 165.1 cm (65\")   PainSc:   2   PainLoc: Knee     Estimated body mass index is 29.79 kg/m² as calculated from the following:    Height as of this encounter: 165.1 cm (65\").    Weight as of this encounter: 81.2 kg (179 lb).           Does the patient have evidence of cognitive impairment?   No            HEALTH RISK ASSESSMENT    Smoking Status:  Social History     Tobacco Use   Smoking Status Never   Smokeless Tobacco Never   Tobacco Comments    21 years old     Alcohol Consumption:  Social History     Substance and Sexual Activity "   Alcohol Use No     Fall Risk Screen:    MARYSE Fall Risk Assessment was completed, and patient is at LOW risk for falls.Assessment completed on:2023    Depression Screenin/14/2023    10:10 AM   PHQ-2/PHQ-9 Depression Screening   Little Interest or Pleasure in Doing Things 3-->nearly every day   Feeling Down, Depressed or Hopeless 3-->nearly every day   Trouble Falling or Staying Asleep, or Sleeping Too Much 3-->nearly every day   Feeling Tired or Having Little Energy 3-->nearly every day   Poor Appetite or Overeating 2-->more than half the days   Feeling Bad about Yourself - or that You are a Failure or Have Let Yourself or Your Family Down 0-->not at all   Trouble Concentrating on Things, Such as Reading the Newspaper or Watching Television 1-->several days   Moving or Speaking So Slowly that Other People Could Have Noticed? Or the Opposite - Being So Fidgety 2-->more than half the days   Thoughts that You Would be Better Off Dead or of Hurting Yourself in Some Way 0-->not at all   PHQ-9: Brief Depression Severity Measure Score 17   If You Checked Off Any Problems, How Difficult Have These Problems Made It For You to Do Your Work, Take Care of Things at Home, or Get Along with Other People? very difficult       Health Habits and Functional and Cognitive Screenin/14/2023    10:09 AM   Functional & Cognitive Status   Do you have difficulty preparing food and eating? No   Do you have difficulty bathing yourself, getting dressed or grooming yourself? No   Do you have difficulty using the toilet? No   Do you have difficulty moving around from place to place? No   Do you have trouble with steps or getting out of a bed or a chair? No   Current Diet Frequent Junk Food   Dental Exam Not up to date   Eye Exam Up to date   Exercise (times per week) 0 times per week   Current Exercises Include No Regular Exercise   Do you need help using the phone?  No   Are you deaf or do you have serious  difficulty hearing?  No   Do you need help to go to places out of walking distance? No   Do you need help shopping? No   Do you need help preparing meals?  No   Do you need help with housework?  No   Do you need help with laundry? No   Do you need help taking your medications? No   Do you need help managing money? No   Do you ever drive or ride in a car without wearing a seat belt? No   Have you felt unusual stress, anger or loneliness in the last month? Yes   Who do you live with? Child   If you need help, do you have trouble finding someone available to you? No   Have you been bothered in the last four weeks by sexual problems? No   Do you have difficulty concentrating, remembering or making decisions? Yes       Age-appropriate Screening Schedule:  Refer to the list below for future screening recommendations based on patient's age, sex and/or medical conditions. Orders for these recommended tests are listed in the plan section. The patient has been provided with a written plan.    Health Maintenance   Topic Date Due    DXA SCAN  Never done    COVID-19 Vaccine (1) Never done    TDAP/TD VACCINES (1 - Tdap) Never done    ZOSTER VACCINE (1 of 2) Never done    PAP SMEAR  Never done    MAMMOGRAM  05/21/2020    DIABETIC FOOT EXAM  06/03/2021    HEMOGLOBIN A1C  08/27/2023    INFLUENZA VACCINE  03/31/2024 (Originally 8/1/2023)    LIPID PANEL  02/27/2024    DIABETIC EYE EXAM  06/28/2024    BMI FOLLOWUP  08/22/2024    ANNUAL WELLNESS VISIT  12/14/2024    URINE MICROALBUMIN  12/14/2024    COLORECTAL CANCER SCREENING  05/19/2025    HEPATITIS C SCREENING  Completed    Pneumococcal Vaccine 65+  Completed                  CMS Preventative Services Quick Reference  Risk Factors Identified During Encounter:    Depression/Dysphoria: Elevated PHQ score reflective of other stress or illness, not depression  Fall Risk-High or Moderate: Discussed Fall Prevention in the home  Immunizations Discussed/Encouraged:  Patient  declined  Polypharmacy: Medication List reviewed  Dental Screening Recommended  Vision Screening Recommended  Mammogram declined. Patient is having to change PCP due to insurance issues.     The above risks/problems have been discussed with the patient.  Pertinent information has been shared with the patient in the After Visit Summary.    Diagnoses and all orders for this visit:    1. Medicare annual wellness visit, subsequent (Primary)    2. Essential hypertension  -     Comprehensive Metabolic Panel    3. Hypomagnesemia  -     Comprehensive Metabolic Panel  -     Magnesium    4. Type 2 diabetes mellitus with microalbuminuria, without long-term current use of insulin  -     Lipid Panel  -     Comprehensive Metabolic Panel  -     Hemoglobin A1c  -     POC Microalbumin    5. Hyperuricemia  -     Uric Acid    6. Gastroesophageal reflux disease without esophagitis    Plan: In addition to the above patient will establish care with a new PCP. Chronic conditions are stable but require surveillance labs. She will continue all current medications. She was advised to establish care with a new PCP within 3 months    Follow Up:   Next Medicare Wellness visit to be scheduled in 1 year.      An After Visit Summary and PPPS were made available to the patient.

## 2023-12-15 LAB
ALBUMIN SERPL-MCNC: 4.7 G/DL (ref 3.5–5.2)
ALBUMIN/GLOB SERPL: 1.8 G/DL
ALP SERPL-CCNC: 90 U/L (ref 39–117)
ALT SERPL-CCNC: 36 U/L (ref 1–33)
AST SERPL-CCNC: 38 U/L (ref 1–32)
BILIRUB SERPL-MCNC: 0.9 MG/DL (ref 0–1.2)
BUN SERPL-MCNC: 11 MG/DL (ref 8–23)
BUN/CREAT SERPL: 13.4 (ref 7–25)
CALCIUM SERPL-MCNC: 9.8 MG/DL (ref 8.6–10.5)
CHLORIDE SERPL-SCNC: 99 MMOL/L (ref 98–107)
CHOLEST SERPL-MCNC: 126 MG/DL (ref 0–200)
CO2 SERPL-SCNC: 25 MMOL/L (ref 22–29)
CREAT SERPL-MCNC: 0.82 MG/DL (ref 0.57–1)
EGFRCR SERPLBLD CKD-EPI 2021: 76.1 ML/MIN/1.73
GLOBULIN SER CALC-MCNC: 2.6 GM/DL
GLUCOSE SERPL-MCNC: 234 MG/DL (ref 65–99)
HBA1C MFR BLD: 7.7 % (ref 4.8–5.6)
HDLC SERPL-MCNC: 39 MG/DL (ref 40–60)
LDLC SERPL CALC-MCNC: 53 MG/DL (ref 0–100)
MAGNESIUM SERPL-MCNC: 1.4 MG/DL (ref 1.6–2.4)
POTASSIUM SERPL-SCNC: 4.3 MMOL/L (ref 3.5–5.2)
PROT SERPL-MCNC: 7.3 G/DL (ref 6–8.5)
SODIUM SERPL-SCNC: 141 MMOL/L (ref 136–145)
TRIGL SERPL-MCNC: 209 MG/DL (ref 0–150)
URATE SERPL-MCNC: 4.8 MG/DL (ref 2.4–5.7)
VLDLC SERPL CALC-MCNC: 34 MG/DL (ref 5–40)

## 2023-12-18 ENCOUNTER — TELEPHONE (OUTPATIENT)
Dept: FAMILY MEDICINE CLINIC | Facility: CLINIC | Age: 73
End: 2023-12-18
Payer: MEDICARE

## 2023-12-18 DIAGNOSIS — R80.9 TYPE 2 DIABETES MELLITUS WITH MICROALBUMINURIA, WITHOUT LONG-TERM CURRENT USE OF INSULIN: ICD-10-CM

## 2023-12-18 DIAGNOSIS — E11.29 TYPE 2 DIABETES MELLITUS WITH MICROALBUMINURIA, WITHOUT LONG-TERM CURRENT USE OF INSULIN: ICD-10-CM

## 2023-12-18 RX ORDER — CEFDINIR 300 MG/1
300 CAPSULE ORAL 2 TIMES DAILY
Qty: 14 CAPSULE | Refills: 0 | Status: SHIPPED | OUTPATIENT
Start: 2023-12-18

## 2023-12-18 RX ORDER — GLIMEPIRIDE 1 MG/1
2 TABLET ORAL
Qty: 180 TABLET | Refills: 0 | Status: SHIPPED | OUTPATIENT
Start: 2023-12-18

## 2023-12-18 NOTE — TELEPHONE ENCOUNTER
Pt called stating she has greenish drainage with coughing with swollen eyes.  She has a sinus headache and sore throat and feeling hot and cold all night. She was feeling bad when see was in last week but did not get anything called in.  Could she get something called in for the sinus infection [facility]

## 2024-01-11 DIAGNOSIS — M10.00 IDIOPATHIC GOUT, UNSPECIFIED CHRONICITY, UNSPECIFIED SITE: ICD-10-CM

## 2024-01-11 RX ORDER — ALLOPURINOL 100 MG/1
TABLET ORAL
Qty: 180 TABLET | Refills: 0 | OUTPATIENT
Start: 2024-01-11

## 2024-01-29 ENCOUNTER — TRANSCRIBE ORDERS (OUTPATIENT)
Dept: ADMINISTRATIVE | Facility: HOSPITAL | Age: 74
End: 2024-01-29
Payer: MEDICARE

## 2024-01-29 DIAGNOSIS — Z12.31 VISIT FOR SCREENING MAMMOGRAM: Primary | ICD-10-CM

## 2024-01-31 DIAGNOSIS — I10 ESSENTIAL HYPERTENSION: ICD-10-CM

## 2024-01-31 RX ORDER — LOSARTAN POTASSIUM 100 MG/1
TABLET ORAL
Qty: 30 TABLET | Refills: 0 | Status: SHIPPED | OUTPATIENT
Start: 2024-01-31

## 2024-03-04 DIAGNOSIS — I10 ESSENTIAL HYPERTENSION: ICD-10-CM

## 2024-03-04 RX ORDER — DILTIAZEM HYDROCHLORIDE 120 MG/1
240 TABLET, FILM COATED ORAL 2 TIMES DAILY
Qty: 360 TABLET | Refills: 0 | OUTPATIENT
Start: 2024-03-04

## 2024-03-12 DIAGNOSIS — R80.9 TYPE 2 DIABETES MELLITUS WITH MICROALBUMINURIA, WITHOUT LONG-TERM CURRENT USE OF INSULIN: ICD-10-CM

## 2024-03-12 DIAGNOSIS — E11.29 TYPE 2 DIABETES MELLITUS WITH MICROALBUMINURIA, WITHOUT LONG-TERM CURRENT USE OF INSULIN: ICD-10-CM

## 2024-03-12 RX ORDER — GLIMEPIRIDE 1 MG/1
TABLET ORAL
Qty: 180 TABLET | Refills: 0 | Status: SHIPPED | OUTPATIENT
Start: 2024-03-12

## 2024-11-21 ENCOUNTER — TRANSCRIBE ORDERS (OUTPATIENT)
Dept: ADMINISTRATIVE | Facility: HOSPITAL | Age: 74
End: 2024-11-21
Payer: MEDICARE

## 2024-11-21 DIAGNOSIS — J98.59 MEDIASTINAL MASS: Primary | ICD-10-CM
